# Patient Record
Sex: FEMALE | Race: WHITE | NOT HISPANIC OR LATINO
[De-identification: names, ages, dates, MRNs, and addresses within clinical notes are randomized per-mention and may not be internally consistent; named-entity substitution may affect disease eponyms.]

---

## 2017-03-27 ENCOUNTER — APPOINTMENT (OUTPATIENT)
Dept: RADIOLOGY | Facility: IMAGING CENTER | Age: 67
End: 2017-03-27

## 2017-05-22 ENCOUNTER — OUTPATIENT (OUTPATIENT)
Dept: OUTPATIENT SERVICES | Facility: HOSPITAL | Age: 67
LOS: 1 days | End: 2017-05-22
Payer: COMMERCIAL

## 2017-05-22 ENCOUNTER — APPOINTMENT (OUTPATIENT)
Dept: RADIOLOGY | Facility: IMAGING CENTER | Age: 67
End: 2017-05-22

## 2017-05-22 DIAGNOSIS — M85.80 OTHER SPECIFIED DISORDERS OF BONE DENSITY AND STRUCTURE, UNSPECIFIED SITE: ICD-10-CM

## 2017-05-22 PROCEDURE — 77080 DXA BONE DENSITY AXIAL: CPT

## 2017-09-19 ENCOUNTER — OUTPATIENT (OUTPATIENT)
Dept: OUTPATIENT SERVICES | Facility: HOSPITAL | Age: 67
LOS: 1 days | End: 2017-09-19
Payer: MEDICARE

## 2017-09-19 ENCOUNTER — APPOINTMENT (OUTPATIENT)
Dept: MAMMOGRAPHY | Facility: IMAGING CENTER | Age: 67
End: 2017-09-19
Payer: MEDICARE

## 2017-09-19 DIAGNOSIS — Z00.8 ENCOUNTER FOR OTHER GENERAL EXAMINATION: ICD-10-CM

## 2017-09-19 PROCEDURE — 77067 SCR MAMMO BI INCL CAD: CPT

## 2017-09-19 PROCEDURE — G0202: CPT | Mod: 26

## 2017-09-19 PROCEDURE — 77063 BREAST TOMOSYNTHESIS BI: CPT | Mod: 26

## 2017-09-19 PROCEDURE — 77063 BREAST TOMOSYNTHESIS BI: CPT

## 2018-09-20 ENCOUNTER — APPOINTMENT (OUTPATIENT)
Dept: MAMMOGRAPHY | Facility: IMAGING CENTER | Age: 68
End: 2018-09-20
Payer: MEDICARE

## 2018-09-20 ENCOUNTER — OUTPATIENT (OUTPATIENT)
Dept: OUTPATIENT SERVICES | Facility: HOSPITAL | Age: 68
LOS: 1 days | End: 2018-09-20
Payer: MEDICARE

## 2018-09-20 DIAGNOSIS — Z12.31 ENCOUNTER FOR SCREENING MAMMOGRAM FOR MALIGNANT NEOPLASM OF BREAST: ICD-10-CM

## 2018-09-20 DIAGNOSIS — R92.2 INCONCLUSIVE MAMMOGRAM: ICD-10-CM

## 2018-09-20 PROCEDURE — 77067 SCR MAMMO BI INCL CAD: CPT | Mod: 26

## 2018-09-20 PROCEDURE — 77063 BREAST TOMOSYNTHESIS BI: CPT

## 2018-09-20 PROCEDURE — 77063 BREAST TOMOSYNTHESIS BI: CPT | Mod: 26

## 2018-09-20 PROCEDURE — 77067 SCR MAMMO BI INCL CAD: CPT

## 2019-11-11 ENCOUNTER — OUTPATIENT (OUTPATIENT)
Dept: OUTPATIENT SERVICES | Facility: HOSPITAL | Age: 69
LOS: 1 days | End: 2019-11-11
Payer: MEDICARE

## 2019-11-11 ENCOUNTER — APPOINTMENT (OUTPATIENT)
Dept: MAMMOGRAPHY | Facility: IMAGING CENTER | Age: 69
End: 2019-11-11
Payer: MEDICARE

## 2019-11-11 DIAGNOSIS — Z12.31 ENCOUNTER FOR SCREENING MAMMOGRAM FOR MALIGNANT NEOPLASM OF BREAST: ICD-10-CM

## 2019-11-11 PROCEDURE — 77067 SCR MAMMO BI INCL CAD: CPT

## 2019-11-11 PROCEDURE — 77063 BREAST TOMOSYNTHESIS BI: CPT

## 2019-11-11 PROCEDURE — 77067 SCR MAMMO BI INCL CAD: CPT | Mod: 26

## 2019-11-11 PROCEDURE — 77063 BREAST TOMOSYNTHESIS BI: CPT | Mod: 26

## 2020-12-07 ENCOUNTER — OUTPATIENT (OUTPATIENT)
Dept: OUTPATIENT SERVICES | Facility: HOSPITAL | Age: 70
LOS: 1 days | End: 2020-12-07
Payer: MEDICARE

## 2020-12-07 ENCOUNTER — APPOINTMENT (OUTPATIENT)
Dept: RADIOLOGY | Facility: IMAGING CENTER | Age: 70
End: 2020-12-07
Payer: MEDICARE

## 2020-12-07 ENCOUNTER — APPOINTMENT (OUTPATIENT)
Dept: MAMMOGRAPHY | Facility: IMAGING CENTER | Age: 70
End: 2020-12-07
Payer: MEDICARE

## 2020-12-07 DIAGNOSIS — Z00.8 ENCOUNTER FOR OTHER GENERAL EXAMINATION: ICD-10-CM

## 2020-12-07 PROCEDURE — 77063 BREAST TOMOSYNTHESIS BI: CPT | Mod: 26

## 2020-12-07 PROCEDURE — 77080 DXA BONE DENSITY AXIAL: CPT | Mod: 26

## 2020-12-07 PROCEDURE — 77067 SCR MAMMO BI INCL CAD: CPT

## 2020-12-07 PROCEDURE — 77067 SCR MAMMO BI INCL CAD: CPT | Mod: 26

## 2020-12-07 PROCEDURE — 77063 BREAST TOMOSYNTHESIS BI: CPT

## 2020-12-07 PROCEDURE — 77080 DXA BONE DENSITY AXIAL: CPT

## 2021-12-09 ENCOUNTER — APPOINTMENT (OUTPATIENT)
Dept: MAMMOGRAPHY | Facility: IMAGING CENTER | Age: 71
End: 2021-12-09
Payer: MEDICARE

## 2021-12-09 ENCOUNTER — OUTPATIENT (OUTPATIENT)
Dept: OUTPATIENT SERVICES | Facility: HOSPITAL | Age: 71
LOS: 1 days | End: 2021-12-09
Payer: MEDICARE

## 2021-12-09 DIAGNOSIS — Z00.8 ENCOUNTER FOR OTHER GENERAL EXAMINATION: ICD-10-CM

## 2021-12-09 PROCEDURE — 77067 SCR MAMMO BI INCL CAD: CPT | Mod: 26

## 2021-12-09 PROCEDURE — 77067 SCR MAMMO BI INCL CAD: CPT

## 2021-12-09 PROCEDURE — 77063 BREAST TOMOSYNTHESIS BI: CPT | Mod: 26

## 2021-12-09 PROCEDURE — 77063 BREAST TOMOSYNTHESIS BI: CPT

## 2022-03-02 ENCOUNTER — INPATIENT (INPATIENT)
Facility: HOSPITAL | Age: 72
LOS: 7 days | Discharge: PSYCHIATRIC OR SUBSTANCE USE FACILITY | DRG: 917 | End: 2022-03-10
Attending: INTERNAL MEDICINE | Admitting: HOSPITALIST
Payer: MEDICARE

## 2022-03-02 VITALS
TEMPERATURE: 99 F | DIASTOLIC BLOOD PRESSURE: 73 MMHG | RESPIRATION RATE: 18 BRPM | HEART RATE: 78 BPM | SYSTOLIC BLOOD PRESSURE: 162 MMHG

## 2022-03-02 DIAGNOSIS — R41.82 ALTERED MENTAL STATUS, UNSPECIFIED: ICD-10-CM

## 2022-03-02 LAB
ALBUMIN SERPL ELPH-MCNC: 3.9 G/DL — SIGNIFICANT CHANGE UP (ref 3.3–5)
ALP SERPL-CCNC: 72 U/L — SIGNIFICANT CHANGE UP (ref 40–120)
ALT FLD-CCNC: 17 U/L — SIGNIFICANT CHANGE UP (ref 10–45)
AMPHET UR-MCNC: NEGATIVE — SIGNIFICANT CHANGE UP
ANION GAP SERPL CALC-SCNC: 17 MMOL/L — SIGNIFICANT CHANGE UP (ref 5–17)
APAP SERPL-MCNC: <15 UG/ML — SIGNIFICANT CHANGE UP (ref 10–30)
APPEARANCE UR: CLEAR — SIGNIFICANT CHANGE UP
APTT BLD: 25.8 SEC — LOW (ref 27.5–35.5)
AST SERPL-CCNC: 34 U/L — SIGNIFICANT CHANGE UP (ref 10–40)
BACTERIA # UR AUTO: NEGATIVE — SIGNIFICANT CHANGE UP
BARBITURATES UR SCN-MCNC: NEGATIVE — SIGNIFICANT CHANGE UP
BASOPHILS # BLD AUTO: 0.01 K/UL — SIGNIFICANT CHANGE UP (ref 0–0.2)
BASOPHILS NFR BLD AUTO: 0.2 % — SIGNIFICANT CHANGE UP (ref 0–2)
BENZODIAZ UR-MCNC: NEGATIVE — SIGNIFICANT CHANGE UP
BILIRUB SERPL-MCNC: 1.1 MG/DL — SIGNIFICANT CHANGE UP (ref 0.2–1.2)
BILIRUB UR-MCNC: ABNORMAL
BUN SERPL-MCNC: 20 MG/DL — SIGNIFICANT CHANGE UP (ref 7–23)
CALCIUM SERPL-MCNC: 9.4 MG/DL — SIGNIFICANT CHANGE UP (ref 8.4–10.5)
CHLORIDE SERPL-SCNC: 100 MMOL/L — SIGNIFICANT CHANGE UP (ref 96–108)
CK SERPL-CCNC: 334 U/L — HIGH (ref 25–170)
CO2 SERPL-SCNC: 17 MMOL/L — LOW (ref 22–31)
COCAINE METAB.OTHER UR-MCNC: NEGATIVE — SIGNIFICANT CHANGE UP
COLOR SPEC: YELLOW — SIGNIFICANT CHANGE UP
CREAT SERPL-MCNC: 0.84 MG/DL — SIGNIFICANT CHANGE UP (ref 0.5–1.3)
DIFF PNL FLD: NEGATIVE — SIGNIFICANT CHANGE UP
EGFR: 75 ML/MIN/1.73M2 — SIGNIFICANT CHANGE UP
EOSINOPHIL # BLD AUTO: 0.01 K/UL — SIGNIFICANT CHANGE UP (ref 0–0.5)
EOSINOPHIL NFR BLD AUTO: 0.2 % — SIGNIFICANT CHANGE UP (ref 0–6)
EPI CELLS # UR: 1 /HPF — SIGNIFICANT CHANGE UP
ETHANOL SERPL-MCNC: SIGNIFICANT CHANGE UP MG/DL (ref 0–10)
GLUCOSE SERPL-MCNC: 117 MG/DL — HIGH (ref 70–99)
GLUCOSE UR QL: NEGATIVE — SIGNIFICANT CHANGE UP
HCT VFR BLD CALC: 44.2 % — SIGNIFICANT CHANGE UP (ref 34.5–45)
HGB BLD-MCNC: 15.7 G/DL — HIGH (ref 11.5–15.5)
HYALINE CASTS # UR AUTO: 14 /LPF — HIGH (ref 0–2)
IMM GRANULOCYTES NFR BLD AUTO: 0.2 % — SIGNIFICANT CHANGE UP (ref 0–1.5)
INR BLD: 1.36 RATIO — HIGH (ref 0.88–1.16)
KETONES UR-MCNC: ABNORMAL
LEUKOCYTE ESTERASE UR-ACNC: NEGATIVE — SIGNIFICANT CHANGE UP
LYMPHOCYTES # BLD AUTO: 0.77 K/UL — LOW (ref 1–3.3)
LYMPHOCYTES # BLD AUTO: 15.9 % — SIGNIFICANT CHANGE UP (ref 13–44)
MCHC RBC-ENTMCNC: 29.2 PG — SIGNIFICANT CHANGE UP (ref 27–34)
MCHC RBC-ENTMCNC: 35.5 GM/DL — SIGNIFICANT CHANGE UP (ref 32–36)
MCV RBC AUTO: 82.2 FL — SIGNIFICANT CHANGE UP (ref 80–100)
METHADONE UR-MCNC: NEGATIVE — SIGNIFICANT CHANGE UP
MONOCYTES # BLD AUTO: 0.41 K/UL — SIGNIFICANT CHANGE UP (ref 0–0.9)
MONOCYTES NFR BLD AUTO: 8.5 % — SIGNIFICANT CHANGE UP (ref 2–14)
NEUTROPHILS # BLD AUTO: 3.64 K/UL — SIGNIFICANT CHANGE UP (ref 1.8–7.4)
NEUTROPHILS NFR BLD AUTO: 75 % — SIGNIFICANT CHANGE UP (ref 43–77)
NITRITE UR-MCNC: NEGATIVE — SIGNIFICANT CHANGE UP
NRBC # BLD: 0 /100 WBCS — SIGNIFICANT CHANGE UP (ref 0–0)
OPIATES UR-MCNC: NEGATIVE — SIGNIFICANT CHANGE UP
OXYCODONE UR-MCNC: NEGATIVE — SIGNIFICANT CHANGE UP
PCP SPEC-MCNC: SIGNIFICANT CHANGE UP
PCP UR-MCNC: POSITIVE
PH UR: 6.5 — SIGNIFICANT CHANGE UP (ref 5–8)
PLATELET # BLD AUTO: 270 K/UL — SIGNIFICANT CHANGE UP (ref 150–400)
POTASSIUM SERPL-MCNC: 3.6 MMOL/L — SIGNIFICANT CHANGE UP (ref 3.5–5.3)
POTASSIUM SERPL-SCNC: 3.6 MMOL/L — SIGNIFICANT CHANGE UP (ref 3.5–5.3)
PROT SERPL-MCNC: 6.4 G/DL — SIGNIFICANT CHANGE UP (ref 6–8.3)
PROT UR-MCNC: ABNORMAL
PROTHROM AB SERPL-ACNC: 15.7 SEC — HIGH (ref 10.5–13.4)
RBC # BLD: 5.38 M/UL — HIGH (ref 3.8–5.2)
RBC # FLD: 13.4 % — SIGNIFICANT CHANGE UP (ref 10.3–14.5)
RBC CASTS # UR COMP ASSIST: 4 /HPF — SIGNIFICANT CHANGE UP (ref 0–4)
SALICYLATES SERPL-MCNC: <2 MG/DL — LOW (ref 15–30)
SODIUM SERPL-SCNC: 134 MMOL/L — LOW (ref 135–145)
SP GR SPEC: 1.03 — HIGH (ref 1.01–1.02)
THC UR QL: NEGATIVE — SIGNIFICANT CHANGE UP
TROPONIN T, HIGH SENSITIVITY RESULT: 15 NG/L — SIGNIFICANT CHANGE UP (ref 0–51)
UROBILINOGEN FLD QL: ABNORMAL
WBC # BLD: 4.85 K/UL — SIGNIFICANT CHANGE UP (ref 3.8–10.5)
WBC # FLD AUTO: 4.85 K/UL — SIGNIFICANT CHANGE UP (ref 3.8–10.5)
WBC UR QL: 3 /HPF — SIGNIFICANT CHANGE UP (ref 0–5)

## 2022-03-02 PROCEDURE — 99285 EMERGENCY DEPT VISIT HI MDM: CPT

## 2022-03-02 PROCEDURE — 72170 X-RAY EXAM OF PELVIS: CPT | Mod: 26

## 2022-03-02 PROCEDURE — 72125 CT NECK SPINE W/O DYE: CPT | Mod: 26,MA

## 2022-03-02 PROCEDURE — 93010 ELECTROCARDIOGRAM REPORT: CPT

## 2022-03-02 PROCEDURE — 70498 CT ANGIOGRAPHY NECK: CPT | Mod: 26,MA

## 2022-03-02 PROCEDURE — 70496 CT ANGIOGRAPHY HEAD: CPT | Mod: 26,MA

## 2022-03-02 PROCEDURE — 71045 X-RAY EXAM CHEST 1 VIEW: CPT | Mod: 26

## 2022-03-02 PROCEDURE — 70450 CT HEAD/BRAIN W/O DYE: CPT | Mod: 26,59,MA

## 2022-03-02 PROCEDURE — 73030 X-RAY EXAM OF SHOULDER: CPT | Mod: 26,LT

## 2022-03-02 RX ORDER — SODIUM CHLORIDE 9 MG/ML
1000 INJECTION, SOLUTION INTRAVENOUS ONCE
Refills: 0 | Status: COMPLETED | OUTPATIENT
Start: 2022-03-02 | End: 2022-03-02

## 2022-03-02 RX ADMIN — SODIUM CHLORIDE 1000 MILLILITER(S): 9 INJECTION, SOLUTION INTRAVENOUS at 19:56

## 2022-03-02 NOTE — ED ADULT NURSE NOTE - NS ED NOTE ABUSE RESPONSE YN
Pt states he needed the Metrix lancet and not the test strips.  Please send the new rx for the lancets to [facility]/[place] in [place].  Message confirmed.      Sending to Dr. Dobbins's pool.       No

## 2022-03-02 NOTE — ED ADULT TRIAGE NOTE - CHIEF COMPLAINT QUOTE
fall, found down by landlord naked with multiple bruises of various stages of healing, psych hx with polypharmacy denies all complaints

## 2022-03-02 NOTE — ED PROVIDER NOTE - OBJECTIVE STATEMENT
72 y/o female with PMHx of psychiatric illness brought in by ambulance [facility] As per patients bother she recently had several changes made to psychiatric medications over past few weeks. Usually he talks to her everyday. However, he has been unable to get in touch with her since Saturday. Patient does remember what happened over the past few days. She states that she thinks she may have had multiple falls. Patient states that she takes seroquen and ativan daily. Denies taking more medication than she is prescribed. Denies taking any other medications daily. Patient is oriented to place and self only. Unable to obtain full ROS or history from patient. She denies any suicidal or homicidal ideations.

## 2022-03-02 NOTE — ED PROVIDER NOTE - NSICDXPASTMEDICALHX_GEN_ALL_CORE_FT
PAST MEDICAL HISTORY:  No pertinent past medical history PAST MEDICAL HISTORY:  Anxiety and depression

## 2022-03-02 NOTE — ED ADULT NURSE NOTE - OBJECTIVE STATEMENT
70 y/o female with PMH psych illness, BIBEMS presenting to ED for fall. Per EMS, pt was found at the bottom of the steps by landlord, naked, with multiple bruises in multiple stages of healing throughout body.   As per patients bother she recently had several changes made to psychiatric medications over past few weeks. Usually he talks to her everyday. However, he has been unable to get in touch with her since Saturday. Patient does remember what happened over the past few days. She states that she thinks she may have had multiple falls. Patient states that she takes seroquen and ativan daily. Denies taking more medication than she is prescribed. Denies taking any other medications daily. Patient is alert, oriented to place and self only, no gross deformities or wounds noted, pt noted to have multiple bruises throughout body in multiple stages of healing. Unable to obtain full ROS or history from patient. She denies any suicidal or homicidal ideations.

## 2022-03-02 NOTE — ED ADULT NURSE REASSESSMENT NOTE - NS ED NURSE REASSESS COMMENT FT1
Family at bedside reports to MD & RN that they feel pt is unsafe to go home where pt lives alone at this time d/t multiple falls and history of depression.

## 2022-03-02 NOTE — ED PROVIDER NOTE - PHYSICAL EXAMINATION
CONSTITUTIONAL: Patient is awake, alert and oriented to self and place, patient appears confused, unable to recall events; covered in multiple bruises   HEAD: NCAT  EYES: PERRL bilaterally, EOMI,   NECK: Supple,   LUNGS: CTA B/L, no wheezes, rhonci or rales  HEART: RRR.+S1S2 no murmurs,   ABDOMEN: Soft, non-tender to palpation throughout all four quadrants,   MSK: No edema or calf tenderness b/l, FROM upper and lower ext b/l, (+) ecchymosis/abrasion to left shoulder, b/l wrists and elbows and right hip,   SKIN: multiple skin abrasions appear old;   NEURO: No focal deficits, unable to fully access;

## 2022-03-02 NOTE — ED PROVIDER NOTE - ATTENDING CONTRIBUTION TO CARE
Attending Statement (RIGO Solano MD):    HPI: 72y/o F with h/o depression, presenting after found by landlord on ground, partially unclothed; per collateral information from family, they have been unable to get in touch with her for several days (last known normal - per phoen conversation 3-4 days ago). Patient states she has been falling a lot, unsure why, states recently started on new psych medications (seroquel and ativan) but is otherwise unable to give more details of hpi.    Review of Systems:  -limited see hpi    PSH/PMH as noted above    On Physical Exam:  General: awake/alert, but appears to at times not understand questions or have difficulty answering questions  HEENT: PERRL, MMM  Neck: no neck tenderness, no nuchal rigidity  Cardiac: normal s1, s2; RRR; no MGR  Lungs: CTABL  Abdomen: soft nontender/nondistended  : no bladder tenderness or distension  Skin: intact, no rash  Extremities: no peripheral edema, no gross deformities  Neuro: no gross neurologic deficits; CN II-XII grossly intact; no rigidity or clonus, no tremors; 5/5 str grossly in all extremities, no reported regions of sensation loss in extremities; normal coordination with upper and lower extremities  Psych: depressed affect; denies SI/HI, denies AH/VH; does not appear to be responding to internal stimuli.      MDM: unwitnessed falls; AMS/confusion; not appearing grossly encephalopathic, not meningitic; possible metabolic derangements, unlikely infectious given afebrile; possible medication induced; per family collateral lives alone - at present is not a safe discharge; will proceed with further work-up in ED and then admission for further evaluation/management.

## 2022-03-02 NOTE — ED ADULT NURSE REASSESSMENT NOTE - NS ED NURSE REASSESS COMMENT FT1
Pt sleeping. Vitals taken, see flow sheets. Reoriented and educated on the call bell and call bell within reach.

## 2022-03-02 NOTE — ED ADULT NURSE NOTE - NSIMPLEMENTINTERV_GEN_ALL_ED
Implemented All Fall Risk Interventions:  [facility] to call system. Call bell, personal items and telephone within reach. Instruct patient to call for assistance. Room bathroom lighting operational. Non-slip footwear when patient is off stretcher. Physically safe environment: no spills, clutter or unnecessary equipment. Stretcher in lowest position, wheels locked, appropriate side rails in place. Provide visual cue, wrist band, yellow gown, etc. Monitor gait and stability. Monitor for mental status changes and reorient to person, place, and time. Review medications for side effects contributing to fall risk. Reinforce activity limits and safety measures with patient and family.

## 2022-03-03 DIAGNOSIS — F32.2 MAJOR DEPRESSIVE DISORDER, SINGLE EPISODE, SEVERE WITHOUT PSYCHOTIC FEATURES: ICD-10-CM

## 2022-03-03 DIAGNOSIS — E86.0 DEHYDRATION: ICD-10-CM

## 2022-03-03 DIAGNOSIS — G92.8 OTHER TOXIC ENCEPHALOPATHY: ICD-10-CM

## 2022-03-03 DIAGNOSIS — Z29.9 ENCOUNTER FOR PROPHYLACTIC MEASURES, UNSPECIFIED: ICD-10-CM

## 2022-03-03 DIAGNOSIS — F41.9 ANXIETY DISORDER, UNSPECIFIED: ICD-10-CM

## 2022-03-03 LAB
ANION GAP SERPL CALC-SCNC: 14 MMOL/L — SIGNIFICANT CHANGE UP (ref 5–17)
APPEARANCE UR: CLEAR — SIGNIFICANT CHANGE UP
BACTERIA # UR AUTO: NEGATIVE — SIGNIFICANT CHANGE UP
BILIRUB UR-MCNC: ABNORMAL
BUN SERPL-MCNC: 17 MG/DL — SIGNIFICANT CHANGE UP (ref 7–23)
CALCIUM SERPL-MCNC: 9.2 MG/DL — SIGNIFICANT CHANGE UP (ref 8.4–10.5)
CHLORIDE SERPL-SCNC: 102 MMOL/L — SIGNIFICANT CHANGE UP (ref 96–108)
CO2 SERPL-SCNC: 19 MMOL/L — LOW (ref 22–31)
COLOR SPEC: YELLOW — SIGNIFICANT CHANGE UP
CREAT SERPL-MCNC: 0.56 MG/DL — SIGNIFICANT CHANGE UP (ref 0.5–1.3)
DIFF PNL FLD: NEGATIVE — SIGNIFICANT CHANGE UP
EGFR: 99 ML/MIN/1.73M2 — SIGNIFICANT CHANGE UP
GLUCOSE SERPL-MCNC: 102 MG/DL — HIGH (ref 70–99)
GLUCOSE UR QL: NEGATIVE — SIGNIFICANT CHANGE UP
HCT VFR BLD CALC: 38.5 % — SIGNIFICANT CHANGE UP (ref 34.5–45)
HGB BLD-MCNC: 13.6 G/DL — SIGNIFICANT CHANGE UP (ref 11.5–15.5)
KETONES UR-MCNC: ABNORMAL
LEUKOCYTE ESTERASE UR-ACNC: NEGATIVE — SIGNIFICANT CHANGE UP
MCHC RBC-ENTMCNC: 29.2 PG — SIGNIFICANT CHANGE UP (ref 27–34)
MCHC RBC-ENTMCNC: 35.3 GM/DL — SIGNIFICANT CHANGE UP (ref 32–36)
MCV RBC AUTO: 82.8 FL — SIGNIFICANT CHANGE UP (ref 80–100)
NITRITE UR-MCNC: NEGATIVE — SIGNIFICANT CHANGE UP
NRBC # BLD: 0 /100 WBCS — SIGNIFICANT CHANGE UP (ref 0–0)
PH UR: 6.5 — SIGNIFICANT CHANGE UP (ref 5–8)
PLATELET # BLD AUTO: 240 K/UL — SIGNIFICANT CHANGE UP (ref 150–400)
POTASSIUM SERPL-MCNC: 3.6 MMOL/L — SIGNIFICANT CHANGE UP (ref 3.5–5.3)
POTASSIUM SERPL-SCNC: 3.6 MMOL/L — SIGNIFICANT CHANGE UP (ref 3.5–5.3)
PROT UR-MCNC: ABNORMAL
RBC # BLD: 4.65 M/UL — SIGNIFICANT CHANGE UP (ref 3.8–5.2)
RBC # FLD: 13.9 % — SIGNIFICANT CHANGE UP (ref 10.3–14.5)
RBC CASTS # UR COMP ASSIST: 2 /HPF — SIGNIFICANT CHANGE UP (ref 0–4)
SARS-COV-2 RNA SPEC QL NAA+PROBE: SIGNIFICANT CHANGE UP
SODIUM SERPL-SCNC: 135 MMOL/L — SIGNIFICANT CHANGE UP (ref 135–145)
SP GR SPEC: 1.04 — HIGH (ref 1.01–1.02)
TSH SERPL-MCNC: 4.05 UIU/ML — SIGNIFICANT CHANGE UP (ref 0.27–4.2)
UROBILINOGEN FLD QL: ABNORMAL
VIT B12 SERPL-MCNC: 1773 PG/ML — HIGH (ref 232–1245)
WBC # BLD: 5.52 K/UL — SIGNIFICANT CHANGE UP (ref 3.8–10.5)
WBC # FLD AUTO: 5.52 K/UL — SIGNIFICANT CHANGE UP (ref 3.8–10.5)
WBC UR QL: 2 /HPF — SIGNIFICANT CHANGE UP (ref 0–5)

## 2022-03-03 PROCEDURE — 99223 1ST HOSP IP/OBS HIGH 75: CPT

## 2022-03-03 PROCEDURE — 93010 ELECTROCARDIOGRAM REPORT: CPT

## 2022-03-03 RX ORDER — SODIUM CHLORIDE 9 MG/ML
1000 INJECTION INTRAMUSCULAR; INTRAVENOUS; SUBCUTANEOUS ONCE
Refills: 0 | Status: COMPLETED | OUTPATIENT
Start: 2022-03-03 | End: 2022-03-03

## 2022-03-03 RX ORDER — SODIUM CHLORIDE 9 MG/ML
1000 INJECTION, SOLUTION INTRAVENOUS
Refills: 0 | Status: DISCONTINUED | OUTPATIENT
Start: 2022-03-03 | End: 2022-03-04

## 2022-03-03 RX ORDER — LAMOTRIGINE 25 MG/1
125 TABLET, ORALLY DISINTEGRATING ORAL DAILY
Refills: 0 | Status: DISCONTINUED | OUTPATIENT
Start: 2022-03-03 | End: 2022-03-10

## 2022-03-03 RX ORDER — LAMOTRIGINE 25 MG/1
150 TABLET, ORALLY DISINTEGRATING ORAL AT BEDTIME
Refills: 0 | Status: DISCONTINUED | OUTPATIENT
Start: 2022-03-03 | End: 2022-03-10

## 2022-03-03 RX ORDER — HEPARIN SODIUM 5000 [USP'U]/ML
5000 INJECTION INTRAVENOUS; SUBCUTANEOUS EVERY 8 HOURS
Refills: 0 | Status: DISCONTINUED | OUTPATIENT
Start: 2022-03-03 | End: 2022-03-07

## 2022-03-03 RX ADMIN — HEPARIN SODIUM 5000 UNIT(S): 5000 INJECTION INTRAVENOUS; SUBCUTANEOUS at 21:53

## 2022-03-03 RX ADMIN — SODIUM CHLORIDE 1000 MILLILITER(S): 9 INJECTION INTRAMUSCULAR; INTRAVENOUS; SUBCUTANEOUS at 11:40

## 2022-03-03 RX ADMIN — HEPARIN SODIUM 5000 UNIT(S): 5000 INJECTION INTRAVENOUS; SUBCUTANEOUS at 13:49

## 2022-03-03 RX ADMIN — LAMOTRIGINE 150 MILLIGRAM(S): 25 TABLET, ORALLY DISINTEGRATING ORAL at 21:52

## 2022-03-03 RX ADMIN — SODIUM CHLORIDE 75 MILLILITER(S): 9 INJECTION, SOLUTION INTRAVENOUS at 21:52

## 2022-03-03 RX ADMIN — SODIUM CHLORIDE 75 MILLILITER(S): 9 INJECTION, SOLUTION INTRAVENOUS at 11:40

## 2022-03-03 NOTE — H&P ADULT - PROBLEM SELECTOR PLAN 2
- s/p 1L bolus of crystalloids in the ED  - start LR @ 75 cc/hr, until patient's mental status improves and is able to maintain adequate oral intake on her own

## 2022-03-03 NOTE — BH CONSULTATION LIAISON ASSESSMENT NOTE - RISK ASSESSMENT
Risk factors: presents with SA via OD, still with SI, h/o prior SA, h/o psych admissions    Protective factors: no current HIIP, no active substance abuse, domiciled, social supports, engaged in treatment, compliant with treatment    Overall, pt is at acutely elevated risk of harm and requires psychiatric admission for safety and stabilization.

## 2022-03-03 NOTE — BH CONSULTATION LIAISON ASSESSMENT NOTE - NSBHCONSULTRECOMMENDOTHER_PSY_A_CORE FT
Restart Lamictal 125mg PO daily and 150mg PO qHS    Hold Ativan 0.5mg PO TID and Seroquel 200mg PO qhS for now - monitor for benzo withdrawal and tx accordingly    Check B12    2PC to inpt psych when medically cleared

## 2022-03-03 NOTE — H&P ADULT - ASSESSMENT
70 y/o female with PMHx of depression and anxiety was brought in by ambulance for being found on ground by landlord. She was found to be positive for PCP.

## 2022-03-03 NOTE — H&P ADULT - NSHPADDITIONALINFOADULT_GEN_ALL_CORE
Med rec is incomplete as patient was lethargic. Please confirm home medications with her pharmacy in the morning.

## 2022-03-03 NOTE — H&P ADULT - HISTORY OF PRESENT ILLNESS
70 y/o female with PMHx of psychiatric illness brought in by ambulance for being found on ground by landlord.      Vitals: afebrile, HR 87, /70, SpO2 96% on room air.  Labs: CBC unremarkable. CMP shows metabolic acidosis. . TSH wnl. U/A shows concentrated urine with ketones. No UTI. U tox shows positive PCP.  X ray shoulder- no fracture or dislocation.  CXR- clear lungs  X ray pelvis: no fractures  CT brain: no ICH  CT c-spine: no fracture. Multilevel spondylosis.    ED intervention: LR 1L bolus.  CTA brain and neck: no flow limiting stenosis or occlusion.   72 y/o female with PMHx of depression and anxiety was brought in by ambulance for being found on ground by landlord. Pt states she remembers feeling dizzy and falling down a set of stairs. She does not recall being found by her landlord. She also states her psych meds were recently being adjusted. Her Lexapro had been discontinued, then re-instated about 3-4 weeks ago. Since then she reported feeling more dizzy than usual. She also takes Seroquel and ativan. She endorsed depressed mood but denies any suicidal ideation or hallucination. Further history was limited as patient was lethargic.    Vitals: afebrile, HR 87, /70, SpO2 96% on room air.  Labs: CBC unremarkable. CMP shows metabolic acidosis. . TSH wnl. U/A shows concentrated urine with ketones. No UTI. U tox shows positive PCP.  X ray shoulder- no fracture or dislocation.  CXR- clear lungs  X ray pelvis: no fractures  CT brain: no ICH  CT c-spine: no fracture. Multilevel spondylosis.    ED intervention: LR 1L bolus.  CTA brain and neck: no flow limiting stenosis or occlusion.

## 2022-03-03 NOTE — BH CONSULTATION LIAISON ASSESSMENT NOTE - NSCOMMENTSUICRISKFACT_PSY_ALL_CORE
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 Feels very isolated and lonely.

## 2022-03-03 NOTE — PROGRESS NOTE ADULT - ASSESSMENT
72 y/o female     with PMHx of depression and anxiety  pt admits  to prior  suicidal attempt, trs  ago/ states  she is divorved  and celis s no children           was brought in by ambulance for being found on ground by landlord.   ct  head,  normal  She was found to be positive for PCP.   U tox positive for PCP/  pt  denies  using any  drugs    h/o depression, anxiety   pt   is  alert,  able  to answer  all questions   psych eval,  told  pt needs   pysch  admit  mild  hyponatremia,  on iv fluids   abnormal  ekg.  eith st  depressions/ card eval/  echo   on dvt ppx        72 y/o female     with PMHx of depression and anxiety  pt admits  to prior  suicidal attempt, trs  ago/   states  she is divorved  and celis s no children           was brought in by ambulance for being found on ground by landlord.   ct  head,  normal  She was found to be positive for PCP.  admits to taking  about 90 pills of ativan   U tox positive for PCP/  pt  denies  using any  drugs    h/o depression, anxiety   pt   is  alert,  able  to answer  all questions   psych eval,  told  pt needs   pysch  admit  mild  hyponatremia,  on iv fluids   abnormal  ekg.  eith st  depressions/ card eval/  echo   on dvt ppx

## 2022-03-03 NOTE — BH CONSULTATION LIAISON ASSESSMENT NOTE - CURRENT MEDICATION
MEDICATIONS  (STANDING):  heparin   Injectable 5000 Unit(s) SubCutaneous every 8 hours  lactated ringers. 1000 milliLiter(s) (75 mL/Hr) IV Continuous <Continuous>  sodium chloride 0.9% Bolus 1000 milliLiter(s) IV Bolus once    MEDICATIONS  (PRN):

## 2022-03-03 NOTE — H&P ADULT - PROBLEM SELECTOR PLAN 4
- DVT ppx: heparin subq  - GI ppx: none  - Diet: NPO, pending speech pathology evaluation  - Fluids: LR @ 75 cc/hr

## 2022-03-03 NOTE — H&P ADULT - NSHPLABSRESULTS_GEN_ALL_CORE
LABS:                         15.7   4.85  )-----------( 270      ( 02 Mar 2022 19:42 )             44.2     03-02    134<L>  |  100  |  20  ----------------------------<  117<H>  3.6   |  17<L>  |  0.84    Ca    9.4      02 Mar 2022 19:42    TPro  6.4  /  Alb  3.9  /  TBili  1.1  /  DBili  x   /  AST  34  /  ALT  17  /  AlkPhos  72  03-02    PT/INR - ( 02 Mar 2022 19:42 )   PT: 15.7 sec;   INR: 1.36 ratio         PTT - ( 02 Mar 2022 19:42 )  PTT:25.8 sec  Urinalysis Basic - ( 02 Mar 2022 22:02 )    Color: Yellow / Appearance: Clear / S.032 / pH: x  Gluc: x / Ketone: Large  / Bili: Small / Urobili: 2 mg/dL   Blood: x / Protein: 30 mg/dL / Nitrite: Negative   Leuk Esterase: Negative / RBC: 4 /hpf / WBC 3 /HPF   Sq Epi: x / Non Sq Epi: 1 /hpf / Bacteria: Negative      CARDIAC MARKERS ( 02 Mar 2022 19:42 )  x     / x     / 334 U/L / x     / x              Records reviewed from prior hospitalization.  Labs reviewed remarkable for - CBC unremarkable. CMP shows metabolic acidosis. . TSH wnl. U/A shows concentrated urine with ketones. No UTI. U tox shows positive PCP.  CXR personally reviewed - clear lungs

## 2022-03-03 NOTE — H&P ADULT - PROBLEM SELECTOR PLAN 3
- hold home lexapro, seroquel, and ativan for now  - istop reviewed  - please obtain psych consult in the morning

## 2022-03-03 NOTE — BH CONSULTATION LIAISON ASSESSMENT NOTE - SUMMARY
71F , noncaregiver, retired , domiciled alone, with PPHx bipolar d/o, 2 psych admissions (1987, 1992), remote h/o SA via OD, no h/o substance abuse, with no significant PMH, a/w AMS, later disclosed to staff she had overdosed on 90 tabs Ativan as a suicide attempt, psychiatry consulted for suicidality.  Pt AOx4 on interview, reports several months of worsening depression culminating in SA via OD, left her journal and signed checks as part of her preprations.  States she took 90 tablets of ativan 0.5 mg, 2 tabs Seroquel 200mg and 2 tabs Lamictal 100mg.  Feels upset the SA was not successful, also endorsing persecutory delusions that the devil is after her, if bad things happen it is because of the devil.  Denies HI or substance abuse.

## 2022-03-03 NOTE — BH CONSULTATION LIAISON ASSESSMENT NOTE - HPI (INCLUDE ILLNESS QUALITY, SEVERITY, DURATION, TIMING, CONTEXT, MODIFYING FACTORS, ASSOCIATED SIGNS AND SYMPTOMS)
71F , noncaregiver, retired , domiciled alone, with PPHx bipolar d/o, 2 psych admissions (1987, 1992), remote h/o SA via OD, no h/o substance abuse, with no significant PMH, a/w AMS, later disclosed to staff she had overdosed on 90 tabs Ativan as a suicide attempt, psychiatry consulted for suicidality.     On initial interview patient is AOx4, calm and cooperative, lying in bed. States that she has been feeling very depressed since her psychiatrist of 35 years retired and her close friend moved to [place] around the same time. Reports she made the plan to suicide via OD by taking 90 tablets of ativan 0.5 mg, 2 tabs Seroquel 200mg and 2 tabs Lamictal 100mg.  With regard to preparations, patient states she left behind her journal which described her feelings of depression and blank checks with her signature before taking the pills. She reports that the attempt was both planned and impulsive; states on the day of, she felt as if "she can't do this anymore." States she is upset that it didn't work.  Endorses persecutory delusions that the "devil is trying to get me." Explains that she feels like shes being punished by the devil. Denies alcohol, tobacco, or drug use. Denies AVH, HI, or current manic sx.     Collateral obtained with permission from brother (Sonny): [facility] patient has a history of depression and anxiety for over 40 years now, which started after a bad divorce. Explains that patient has ongoing specific anxieties (ie: driving, elevators) that she takes Ativan for. Patient recently switched psychiatrists to Dr. Car and medications were changed.  Reports pt has a history of 2 prior psych admissions (1st 40 years ago from cutting and 2nd was 20 years ago from overdose).     Spoke with OP psychiatrist Dr. Car who last saw pt on 2/28; states pt was c/o depression without psychosis or SI at that time, and Lamictal was increased.  Pt had recently self-initiated Lexapro for about 13 days from old supply, which he recommended she discontinue as it had not helped her previously.  Pt was continued on Ativan and Seroquel which she has been on for a long time.  [facility] pt was transferred to his care from the prior psychiatrist who retired in November.

## 2022-03-03 NOTE — BH CONSULTATION LIAISON ASSESSMENT NOTE - NSBHCHARTREVIEWLAB_PSY_A_CORE FT
15.7   4.85  )-----------( 270      ( 02 Mar 2022 19:42 )             44.2     03-02    134<L>  |  100  |  20  ----------------------------<  117<H>  3.6   |  17<L>  |  0.84    Ca    9.4      02 Mar 2022 19:42    TPro  6.4  /  Alb  3.9  /  TBili  1.1  /  DBili  x   /  AST  34  /  ALT  17  /  AlkPhos  72  03-02    Urinalysis Basic - ( 02 Mar 2022 22:02 )    Color: Yellow / Appearance: Clear / S.032 / pH: x  Gluc: x / Ketone: Large  / Bili: Small / Urobili: 2 mg/dL   Blood: x / Protein: 30 mg/dL / Nitrite: Negative   Leuk Esterase: Negative / RBC: 4 /hpf / WBC 3 /HPF   Sq Epi: x / Non Sq Epi: 1 /hpf / Bacteria: Negative                           13.6   5.52  )-----------( 240      ( 03 Mar 2022 11:23 )             38.5     03-03    135  |  102  |  17  ----------------------------<  102<H>  3.6   |  19<L>  |  0.56    Ca    9.2      03 Mar 2022 11:23    TPro  6.4  /  Alb  3.9  /  TBili  1.1  /  DBili  x   /  AST  34  /  ALT  17  /  AlkPhos  72  03-02    Urinalysis Basic - ( 02 Mar 2022 22:02 )    Color: Yellow / Appearance: Clear / S.032 / pH: x  Gluc: x / Ketone: Large  / Bili: Small / Urobili: 2 mg/dL   Blood: x / Protein: 30 mg/dL / Nitrite: Negative   Leuk Esterase: Negative / RBC: 4 /hpf / WBC 3 /HPF   Sq Epi: x / Non Sq Epi: 1 /hpf / Bacteria: Negative

## 2022-03-03 NOTE — BH CONSULTATION LIAISON ASSESSMENT NOTE - DESCRIPTION
Patient is  with no children, living alone. Retried . Now volunteering at food pantry.  Denies alcohol, tobacco or drug use.

## 2022-03-03 NOTE — BH CONSULTATION LIAISON ASSESSMENT NOTE - WAS THIS WITHIN THE PAST 3 MONTHS?
Shaina Nadia stopped by the office and dropped off a paper stating he is holding aspirin because of nose bleeds  Can he hold Xarelto 2-3 days? ?? Yes

## 2022-03-03 NOTE — H&P ADULT - PROBLEM SELECTOR PLAN 1
- U tox positive for PCP  - clarify with the patient her drug history once more alert  - mental status improving since ED visit  - NPO pending speech pathology evaluation  - please obtain psych consult in the morning - U tox positive for PCP  - TSH wnl, no evidence of infection to explain encephalopathy  - clarify with the patient her drug history once she is more alert  - mental status improving since ED visit  - NPO pending speech pathology evaluation  - please obtain psych consult in the morning

## 2022-03-03 NOTE — H&P ADULT - NSHPPHYSICALEXAM_GEN_ALL_CORE
Vital Signs Last 24 Hrs  T(C): 36.8 (03 Mar 2022 04:13), Max: 37.6 (02 Mar 2022 19:32)  T(F): 98.3 (03 Mar 2022 04:13), Max: 99.7 (02 Mar 2022 19:32)  HR: 75 (03 Mar 2022 04:13) (75 - 107)  BP: 100/71 (03 Mar 2022 04:13) (100/71 - 162/73)  BP(mean): 94 (02 Mar 2022 23:22) (85 - 97)  RR: 18 (03 Mar 2022 04:13) (18 - 20)  SpO2: 98% (03 Mar 2022 04:13) (95% - 100%)

## 2022-03-03 NOTE — BH CONSULTATION LIAISON ASSESSMENT NOTE - NSBHCHARTREVIEWVS_PSY_A_CORE FT
Vital Signs Last 24 Hrs  T(C): 37.1 (03 Mar 2022 09:51), Max: 37.6 (02 Mar 2022 19:32)  T(F): 98.8 (03 Mar 2022 09:51), Max: 99.7 (02 Mar 2022 19:32)  HR: 92 (03 Mar 2022 09:51) (75 - 107)  BP: 97/57 (03 Mar 2022 09:51) (97/57 - 162/73)  BP(mean): 94 (02 Mar 2022 23:22) (85 - 97)  RR: 18 (03 Mar 2022 09:51) (18 - 20)  SpO2: 95% (03 Mar 2022 09:51) (95% - 100%)

## 2022-03-04 LAB
ANION GAP SERPL CALC-SCNC: 10 MMOL/L — SIGNIFICANT CHANGE UP (ref 5–17)
BUN SERPL-MCNC: 14 MG/DL — SIGNIFICANT CHANGE UP (ref 7–23)
CALCIUM SERPL-MCNC: 8.6 MG/DL — SIGNIFICANT CHANGE UP (ref 8.4–10.5)
CHLORIDE SERPL-SCNC: 106 MMOL/L — SIGNIFICANT CHANGE UP (ref 96–108)
CO2 SERPL-SCNC: 23 MMOL/L — SIGNIFICANT CHANGE UP (ref 22–31)
CREAT SERPL-MCNC: 0.53 MG/DL — SIGNIFICANT CHANGE UP (ref 0.5–1.3)
CULTURE RESULTS: NO GROWTH — SIGNIFICANT CHANGE UP
EGFR: 100 ML/MIN/1.73M2 — SIGNIFICANT CHANGE UP
FOLATE SERPL-MCNC: 7.5 NG/ML — SIGNIFICANT CHANGE UP
GLUCOSE SERPL-MCNC: 109 MG/DL — HIGH (ref 70–99)
HCT VFR BLD CALC: 33.4 % — LOW (ref 34.5–45)
HCV AB S/CO SERPL IA: 0.09 S/CO — SIGNIFICANT CHANGE UP (ref 0–0.99)
HCV AB SERPL-IMP: SIGNIFICANT CHANGE UP
HGB BLD-MCNC: 11.5 G/DL — SIGNIFICANT CHANGE UP (ref 11.5–15.5)
MCHC RBC-ENTMCNC: 28.8 PG — SIGNIFICANT CHANGE UP (ref 27–34)
MCHC RBC-ENTMCNC: 34.4 GM/DL — SIGNIFICANT CHANGE UP (ref 32–36)
MCV RBC AUTO: 83.7 FL — SIGNIFICANT CHANGE UP (ref 80–100)
NRBC # BLD: 0 /100 WBCS — SIGNIFICANT CHANGE UP (ref 0–0)
PLATELET # BLD AUTO: 217 K/UL — SIGNIFICANT CHANGE UP (ref 150–400)
POTASSIUM SERPL-MCNC: 3.1 MMOL/L — LOW (ref 3.5–5.3)
POTASSIUM SERPL-SCNC: 3.1 MMOL/L — LOW (ref 3.5–5.3)
RBC # BLD: 3.99 M/UL — SIGNIFICANT CHANGE UP (ref 3.8–5.2)
RBC # FLD: 13.8 % — SIGNIFICANT CHANGE UP (ref 10.3–14.5)
SODIUM SERPL-SCNC: 139 MMOL/L — SIGNIFICANT CHANGE UP (ref 135–145)
SPECIMEN SOURCE: SIGNIFICANT CHANGE UP
T PALLIDUM AB TITR SER: NEGATIVE — SIGNIFICANT CHANGE UP
WBC # BLD: 4.66 K/UL — SIGNIFICANT CHANGE UP (ref 3.8–10.5)
WBC # FLD AUTO: 4.66 K/UL — SIGNIFICANT CHANGE UP (ref 3.8–10.5)

## 2022-03-04 PROCEDURE — 93306 TTE W/DOPPLER COMPLETE: CPT | Mod: 26

## 2022-03-04 PROCEDURE — 99232 SBSQ HOSP IP/OBS MODERATE 35: CPT

## 2022-03-04 RX ORDER — QUETIAPINE FUMARATE 200 MG/1
200 TABLET, FILM COATED ORAL AT BEDTIME
Refills: 0 | Status: DISCONTINUED | OUTPATIENT
Start: 2022-03-04 | End: 2022-03-10

## 2022-03-04 RX ORDER — ASPIRIN/CALCIUM CARB/MAGNESIUM 324 MG
81 TABLET ORAL DAILY
Refills: 0 | Status: DISCONTINUED | OUTPATIENT
Start: 2022-03-04 | End: 2022-03-10

## 2022-03-04 RX ORDER — POTASSIUM CHLORIDE 20 MEQ
40 PACKET (EA) ORAL EVERY 4 HOURS
Refills: 0 | Status: COMPLETED | OUTPATIENT
Start: 2022-03-04 | End: 2022-03-04

## 2022-03-04 RX ADMIN — QUETIAPINE FUMARATE 200 MILLIGRAM(S): 200 TABLET, FILM COATED ORAL at 21:27

## 2022-03-04 RX ADMIN — LAMOTRIGINE 150 MILLIGRAM(S): 25 TABLET, ORALLY DISINTEGRATING ORAL at 21:22

## 2022-03-04 RX ADMIN — Medication 0.5 MILLIGRAM(S): at 21:22

## 2022-03-04 RX ADMIN — LAMOTRIGINE 125 MILLIGRAM(S): 25 TABLET, ORALLY DISINTEGRATING ORAL at 12:41

## 2022-03-04 RX ADMIN — HEPARIN SODIUM 5000 UNIT(S): 5000 INJECTION INTRAVENOUS; SUBCUTANEOUS at 15:07

## 2022-03-04 RX ADMIN — HEPARIN SODIUM 5000 UNIT(S): 5000 INJECTION INTRAVENOUS; SUBCUTANEOUS at 06:09

## 2022-03-04 RX ADMIN — Medication 40 MILLIEQUIVALENT(S): at 17:32

## 2022-03-04 RX ADMIN — HEPARIN SODIUM 5000 UNIT(S): 5000 INJECTION INTRAVENOUS; SUBCUTANEOUS at 21:22

## 2022-03-04 RX ADMIN — Medication 40 MILLIEQUIVALENT(S): at 12:43

## 2022-03-04 RX ADMIN — Medication 81 MILLIGRAM(S): at 12:40

## 2022-03-04 NOTE — PATIENT PROFILE ADULT - FALL HARM RISK - HARM RISK INTERVENTIONS

## 2022-03-04 NOTE — PROGRESS NOTE ADULT - ASSESSMENT
70 y/o female     with PMHx of depression and anxiety  pt admits  to prior  suicidal attempt, trs  ago/   states  she is divorved  and celis s no children           was brought in by ambulance for being found on ground by landlord.   ct  head,  normal  She was found to be positive for PCP.  admits to taking  about 90 pills of ativan   U tox positive for PCP/  pt  denies  using any  drugs    h/o depression, anxiety   pt   is  alert,  able  to answer  all questions   psych eval,  told  pt needs transfer  to   [facility]  facility/  form  filled/ S/W  awarew  mild  hyponatremia,, resolved   abnormal  ekg.  eith st  depressions/     card eval/  echo  still  pending   [facility] card  clearnce   on dvt ppx

## 2022-03-04 NOTE — BH CONSULTATION LIAISON PROGRESS NOTE - NSBHCONSULTRECOMMENDOTHER_PSY_A_CORE FT
c/w Lamictal 125mg PO daily and 150mg PO qHS    Restart Ativan 0.5mg PO TID     Restart Seroquel 200mg PO qhS for now     2PC to inpt psych when medically cleared

## 2022-03-04 NOTE — BH CONSULTATION LIAISON PROGRESS NOTE - NSBHASSESSMENTFT_PSY_ALL_CORE
bipolar depression severe with psychosis  71F , noncaregiver, retired , domiciled alone, with PPHx bipolar d/o, 2 psych admissions (1987, 1992), remote h/o SA via OD, no h/o substance abuse, with no significant PMH, a/w AMS, later disclosed to staff she had overdosed on 90 tabs Ativan as a suicide attempt, psychiatry consulted for suicidality.  Pt AOx4 on interview, reports several months of worsening depression culminating in SA via OD, left her journal and signed checks as part of her preprations.  States she took 90 tablets of ativan 0.5 mg, 2 tabs Seroquel 200mg and 2 tabs Lamictal 100mg.  Feels upset the SA was not successful, also endorsing persecutory delusions that the devil is after her, if bad things happen it is because of the devil.  Denies HI or substance abuse.

## 2022-03-04 NOTE — PATIENT PROFILE ADULT - FALL HARM RISK - FALLEN IN PAST
Accidental fall b/l LE/alert and oriented x 3/responds to verbal commands/sensation intact/cranial nerves intact/strength decreased

## 2022-03-05 LAB
A1C WITH ESTIMATED AVERAGE GLUCOSE RESULT: 4.8 % — SIGNIFICANT CHANGE UP (ref 4–5.6)
ANION GAP SERPL CALC-SCNC: 10 MMOL/L — SIGNIFICANT CHANGE UP (ref 5–17)
BUN SERPL-MCNC: 6 MG/DL — LOW (ref 7–23)
CALCIUM SERPL-MCNC: 8.9 MG/DL — SIGNIFICANT CHANGE UP (ref 8.4–10.5)
CHLORIDE SERPL-SCNC: 113 MMOL/L — HIGH (ref 96–108)
CHOLEST SERPL-MCNC: 148 MG/DL — SIGNIFICANT CHANGE UP
CO2 SERPL-SCNC: 22 MMOL/L — SIGNIFICANT CHANGE UP (ref 22–31)
CREAT SERPL-MCNC: 0.53 MG/DL — SIGNIFICANT CHANGE UP (ref 0.5–1.3)
EGFR: 100 ML/MIN/1.73M2 — SIGNIFICANT CHANGE UP
ESTIMATED AVERAGE GLUCOSE: 91 MG/DL — SIGNIFICANT CHANGE UP (ref 68–114)
GLUCOSE SERPL-MCNC: 102 MG/DL — HIGH (ref 70–99)
HCT VFR BLD CALC: 32.6 % — LOW (ref 34.5–45)
HDLC SERPL-MCNC: 45 MG/DL — LOW
HGB BLD-MCNC: 11.4 G/DL — LOW (ref 11.5–15.5)
LIPID PNL WITH DIRECT LDL SERPL: 83 MG/DL — SIGNIFICANT CHANGE UP
MCHC RBC-ENTMCNC: 29.8 PG — SIGNIFICANT CHANGE UP (ref 27–34)
MCHC RBC-ENTMCNC: 35 GM/DL — SIGNIFICANT CHANGE UP (ref 32–36)
MCV RBC AUTO: 85.1 FL — SIGNIFICANT CHANGE UP (ref 80–100)
NON HDL CHOLESTEROL: 103 MG/DL — SIGNIFICANT CHANGE UP
NRBC # BLD: 0 /100 WBCS — SIGNIFICANT CHANGE UP (ref 0–0)
PLATELET # BLD AUTO: 221 K/UL — SIGNIFICANT CHANGE UP (ref 150–400)
POTASSIUM SERPL-MCNC: 3.9 MMOL/L — SIGNIFICANT CHANGE UP (ref 3.5–5.3)
POTASSIUM SERPL-SCNC: 3.9 MMOL/L — SIGNIFICANT CHANGE UP (ref 3.5–5.3)
RBC # BLD: 3.83 M/UL — SIGNIFICANT CHANGE UP (ref 3.8–5.2)
RBC # FLD: 14 % — SIGNIFICANT CHANGE UP (ref 10.3–14.5)
SODIUM SERPL-SCNC: 145 MMOL/L — SIGNIFICANT CHANGE UP (ref 135–145)
TRIGL SERPL-MCNC: 98 MG/DL — SIGNIFICANT CHANGE UP
TSH SERPL-MCNC: 7 UIU/ML — HIGH (ref 0.27–4.2)
WBC # BLD: 3.49 K/UL — LOW (ref 3.8–10.5)
WBC # FLD AUTO: 3.49 K/UL — LOW (ref 3.8–10.5)

## 2022-03-05 RX ORDER — SODIUM CHLORIDE 9 MG/ML
500 INJECTION INTRAMUSCULAR; INTRAVENOUS; SUBCUTANEOUS
Refills: 0 | Status: DISCONTINUED | OUTPATIENT
Start: 2022-03-05 | End: 2022-03-06

## 2022-03-05 RX ADMIN — LAMOTRIGINE 150 MILLIGRAM(S): 25 TABLET, ORALLY DISINTEGRATING ORAL at 21:10

## 2022-03-05 RX ADMIN — QUETIAPINE FUMARATE 200 MILLIGRAM(S): 200 TABLET, FILM COATED ORAL at 21:09

## 2022-03-05 RX ADMIN — HEPARIN SODIUM 5000 UNIT(S): 5000 INJECTION INTRAVENOUS; SUBCUTANEOUS at 13:16

## 2022-03-05 RX ADMIN — Medication 0.5 MILLIGRAM(S): at 21:13

## 2022-03-05 RX ADMIN — LAMOTRIGINE 125 MILLIGRAM(S): 25 TABLET, ORALLY DISINTEGRATING ORAL at 13:16

## 2022-03-05 RX ADMIN — Medication 81 MILLIGRAM(S): at 13:16

## 2022-03-05 RX ADMIN — HEPARIN SODIUM 5000 UNIT(S): 5000 INJECTION INTRAVENOUS; SUBCUTANEOUS at 05:28

## 2022-03-05 RX ADMIN — Medication 0.5 MILLIGRAM(S): at 14:24

## 2022-03-05 RX ADMIN — SODIUM CHLORIDE 50 MILLILITER(S): 9 INJECTION INTRAMUSCULAR; INTRAVENOUS; SUBCUTANEOUS at 09:05

## 2022-03-05 RX ADMIN — HEPARIN SODIUM 5000 UNIT(S): 5000 INJECTION INTRAVENOUS; SUBCUTANEOUS at 21:09

## 2022-03-05 RX ADMIN — Medication 0.5 MILLIGRAM(S): at 05:28

## 2022-03-05 NOTE — PROGRESS NOTE ADULT - ASSESSMENT
70 y/o female     with PMHx of depression and anxiety  pt admits  to prior  suicidal attempt, trs  ago/   states  she is divorved  and celis s no children           was brought in by ambulance for being found on ground by landlord.   ct  head,  normal  She was found to be positive for PCP.  admits to taking  about 90 pills of ativan   U tox positive for PCP/  pt  denies  using any  drugs    h/o depression, anxiety   pt   is  alert,  able  to answer  all questions   psych eval,  told  pt needs transfer  to   pysch  facility/  form  filled/ S/W  awarew  mild  hyponatremia,, resolved   abnormal  ekg.  st  depressions/ echo, normal  ef   per  card, need s ischemic  w/p/  stres  test   awiat card  clearance   prior to  transfer to psych facility   on dvt ppx

## 2022-03-06 PROCEDURE — 99231 SBSQ HOSP IP/OBS SF/LOW 25: CPT

## 2022-03-06 RX ADMIN — Medication 81 MILLIGRAM(S): at 12:35

## 2022-03-06 RX ADMIN — HEPARIN SODIUM 5000 UNIT(S): 5000 INJECTION INTRAVENOUS; SUBCUTANEOUS at 14:14

## 2022-03-06 RX ADMIN — HEPARIN SODIUM 5000 UNIT(S): 5000 INJECTION INTRAVENOUS; SUBCUTANEOUS at 21:35

## 2022-03-06 RX ADMIN — QUETIAPINE FUMARATE 200 MILLIGRAM(S): 200 TABLET, FILM COATED ORAL at 21:34

## 2022-03-06 RX ADMIN — Medication 0.5 MILLIGRAM(S): at 14:14

## 2022-03-06 RX ADMIN — HEPARIN SODIUM 5000 UNIT(S): 5000 INJECTION INTRAVENOUS; SUBCUTANEOUS at 05:15

## 2022-03-06 RX ADMIN — LAMOTRIGINE 150 MILLIGRAM(S): 25 TABLET, ORALLY DISINTEGRATING ORAL at 21:34

## 2022-03-06 RX ADMIN — LAMOTRIGINE 125 MILLIGRAM(S): 25 TABLET, ORALLY DISINTEGRATING ORAL at 12:35

## 2022-03-06 RX ADMIN — Medication 0.5 MILLIGRAM(S): at 21:34

## 2022-03-06 RX ADMIN — Medication 0.5 MILLIGRAM(S): at 05:16

## 2022-03-06 NOTE — PROGRESS NOTE ADULT - ASSESSMENT
72 y/o female     with PMHx of depression and anxiety  pt admits  to prior  suicidal attempt, trs  ago/   states  she is divorved  and celis s no children           was brought in by ambulance for being found on ground by landlord.   ct  head,  normal  She was found to be positive for PCP.  admits to taking  about 90 pills of ativan   U tox positive for PCP/  pt  denies  using any  drugs    h/o depression, anxiety   pt   is  alert,  able  to answer  all questions   psych eval,  told  pt needs transfer  to   pysch  facility/  form  filled/ S/W  awarew  mild  hyponatremia,, resolved   abnormal  ekg.  st  depressions/ echo, normal  ef   per  card, need s ischemic  w/p/     awiat card  clearance   prior to  transfer to psych facility   on dvt ppx /  stress  test today

## 2022-03-06 NOTE — BH CONSULTATION LIAISON PROGRESS NOTE - CASE SUMMARY
71F , noncaregiver, retired , domiciled alone, with PPHx bipolar d/o, 2 psych admissions (1987, 1992), remote h/o SA via OD, no h/o substance abuse, with no significant PMH, a/w AMS, later disclosed to staff she had overdosed on 90 tabs Ativan as a suicide attempt, psychiatry consulted for suicidality.  Pt AOx4 on interview, reports several months of worsening depression culminating in SA via OD, left her journal and signed checks as part of her preprations.  States she took 90 tablets of ativan 0.5 mg, 2 tabs Seroquel 200mg and 2 tabs Lamictal 100mg.  pt reports feeling less depressed today, expressed remorse. no si/hi current. cont current meds, 2pc status, waiting for psych bed

## 2022-03-06 NOTE — BH CONSULTATION LIAISON PROGRESS NOTE - NSBHCONSULTRECOMMENDOTHER_PSY_A_CORE FT
c/w Lamictal 125mg PO daily and 150mg PO qHS    Continue Ativan 0.5mg PO TID     Continue Seroquel 200mg PO qhS for now     2PC to inpt psych when medically cleared

## 2022-03-07 LAB — SARS-COV-2 RNA SPEC QL NAA+PROBE: SIGNIFICANT CHANGE UP

## 2022-03-07 PROCEDURE — 99232 SBSQ HOSP IP/OBS MODERATE 35: CPT

## 2022-03-07 PROCEDURE — 93010 ELECTROCARDIOGRAM REPORT: CPT

## 2022-03-07 PROCEDURE — 75574 CT ANGIO HRT W/3D IMAGE: CPT | Mod: 26

## 2022-03-07 RX ORDER — SENNA PLUS 8.6 MG/1
2 TABLET ORAL AT BEDTIME
Refills: 0 | Status: COMPLETED | OUTPATIENT
Start: 2022-03-07 | End: 2022-03-08

## 2022-03-07 RX ORDER — LEVOTHYROXINE SODIUM 125 MCG
25 TABLET ORAL DAILY
Refills: 0 | Status: DISCONTINUED | OUTPATIENT
Start: 2022-03-07 | End: 2022-03-10

## 2022-03-07 RX ORDER — APIXABAN 2.5 MG/1
10 TABLET, FILM COATED ORAL
Refills: 0 | Status: DISCONTINUED | OUTPATIENT
Start: 2022-03-07 | End: 2022-03-09

## 2022-03-07 RX ADMIN — Medication 0.5 MILLIGRAM(S): at 13:53

## 2022-03-07 RX ADMIN — SENNA PLUS 2 TABLET(S): 8.6 TABLET ORAL at 21:06

## 2022-03-07 RX ADMIN — HEPARIN SODIUM 5000 UNIT(S): 5000 INJECTION INTRAVENOUS; SUBCUTANEOUS at 05:02

## 2022-03-07 RX ADMIN — Medication 0.5 MILLIGRAM(S): at 21:06

## 2022-03-07 RX ADMIN — LAMOTRIGINE 150 MILLIGRAM(S): 25 TABLET, ORALLY DISINTEGRATING ORAL at 21:06

## 2022-03-07 RX ADMIN — Medication 0.5 MILLIGRAM(S): at 05:02

## 2022-03-07 RX ADMIN — Medication 81 MILLIGRAM(S): at 12:37

## 2022-03-07 RX ADMIN — HEPARIN SODIUM 5000 UNIT(S): 5000 INJECTION INTRAVENOUS; SUBCUTANEOUS at 13:55

## 2022-03-07 RX ADMIN — LAMOTRIGINE 125 MILLIGRAM(S): 25 TABLET, ORALLY DISINTEGRATING ORAL at 12:37

## 2022-03-07 RX ADMIN — HEPARIN SODIUM 5000 UNIT(S): 5000 INJECTION INTRAVENOUS; SUBCUTANEOUS at 21:06

## 2022-03-07 RX ADMIN — QUETIAPINE FUMARATE 200 MILLIGRAM(S): 200 TABLET, FILM COATED ORAL at 21:06

## 2022-03-07 NOTE — PROGRESS NOTE ADULT - ASSESSMENT
72 y/o female     with PMHx of depression and anxiety  pt admits  to prior  suicidal attempt, trs  ago/   states  she is divorved  and celis s no children           was brought in by ambulance for being found on ground by landlord.   ct  head,  normal  She was found to be positive for PCP.  admits to taking  about 90 pills of ativan   U tox positive for PCP/  pt  denies  using any  drugs    h/o depression, anxiety   pt   is  alert,  able  to answer  all questions   psych eval,  told  pt needs transfer  to   pysch  facility/  form  filled/ S/W  awarew   abnormal  ekg.  st  depressions/ echo, normal  ef   per  card, need s ischemic  w/p/     awiat card  clearance   prior to  transfer to psych facility   on dvt ppx /  await  cardiac  CT  and  then card  clerance/  brother  up  dated

## 2022-03-07 NOTE — BH CONSULTATION LIAISON PROGRESS NOTE - NSBHCONSULTRECOMMENDOTHER_PSY_A_CORE FT
c/w Lamictal 125mg PO daily and 150mg PO qHS    Reduce Ativan to 0.25mg PO qAM, 0.5mg PO qafternoon, and 0.5mg PO qHS    C/w Seroquel 200mg PO qhS     2PC to inpt psych when medically cleared

## 2022-03-08 PROBLEM — F41.9 ANXIETY DISORDER, UNSPECIFIED: Chronic | Status: ACTIVE | Noted: 2022-03-02

## 2022-03-08 LAB
ANION GAP SERPL CALC-SCNC: 10 MMOL/L — SIGNIFICANT CHANGE UP (ref 5–17)
BUN SERPL-MCNC: 8 MG/DL — SIGNIFICANT CHANGE UP (ref 7–23)
CALCIUM SERPL-MCNC: 9.4 MG/DL — SIGNIFICANT CHANGE UP (ref 8.4–10.5)
CHLORIDE SERPL-SCNC: 104 MMOL/L — SIGNIFICANT CHANGE UP (ref 96–108)
CO2 SERPL-SCNC: 28 MMOL/L — SIGNIFICANT CHANGE UP (ref 22–31)
CREAT SERPL-MCNC: 0.64 MG/DL — SIGNIFICANT CHANGE UP (ref 0.5–1.3)
EGFR: 96 ML/MIN/1.73M2 — SIGNIFICANT CHANGE UP
GLUCOSE SERPL-MCNC: 116 MG/DL — HIGH (ref 70–99)
HCT VFR BLD CALC: 34.6 % — SIGNIFICANT CHANGE UP (ref 34.5–45)
HGB BLD-MCNC: 11.7 G/DL — SIGNIFICANT CHANGE UP (ref 11.5–15.5)
MCHC RBC-ENTMCNC: 29.1 PG — SIGNIFICANT CHANGE UP (ref 27–34)
MCHC RBC-ENTMCNC: 33.8 GM/DL — SIGNIFICANT CHANGE UP (ref 32–36)
MCV RBC AUTO: 86.1 FL — SIGNIFICANT CHANGE UP (ref 80–100)
NRBC # BLD: 0 /100 WBCS — SIGNIFICANT CHANGE UP (ref 0–0)
PLATELET # BLD AUTO: 226 K/UL — SIGNIFICANT CHANGE UP (ref 150–400)
POTASSIUM SERPL-MCNC: 3.9 MMOL/L — SIGNIFICANT CHANGE UP (ref 3.5–5.3)
POTASSIUM SERPL-SCNC: 3.9 MMOL/L — SIGNIFICANT CHANGE UP (ref 3.5–5.3)
RBC # BLD: 4.02 M/UL — SIGNIFICANT CHANGE UP (ref 3.8–5.2)
RBC # FLD: 14 % — SIGNIFICANT CHANGE UP (ref 10.3–14.5)
SODIUM SERPL-SCNC: 142 MMOL/L — SIGNIFICANT CHANGE UP (ref 135–145)
WBC # BLD: 3.89 K/UL — SIGNIFICANT CHANGE UP (ref 3.8–10.5)
WBC # FLD AUTO: 3.89 K/UL — SIGNIFICANT CHANGE UP (ref 3.8–10.5)

## 2022-03-08 PROCEDURE — 71275 CT ANGIOGRAPHY CHEST: CPT | Mod: 26

## 2022-03-08 PROCEDURE — 99232 SBSQ HOSP IP/OBS MODERATE 35: CPT

## 2022-03-08 RX ADMIN — Medication 0.5 MILLIGRAM(S): at 14:40

## 2022-03-08 RX ADMIN — Medication 25 MICROGRAM(S): at 05:09

## 2022-03-08 RX ADMIN — LAMOTRIGINE 150 MILLIGRAM(S): 25 TABLET, ORALLY DISINTEGRATING ORAL at 21:01

## 2022-03-08 RX ADMIN — APIXABAN 10 MILLIGRAM(S): 2.5 TABLET, FILM COATED ORAL at 17:40

## 2022-03-08 RX ADMIN — SENNA PLUS 2 TABLET(S): 8.6 TABLET ORAL at 21:01

## 2022-03-08 RX ADMIN — Medication 0.5 MILLIGRAM(S): at 21:02

## 2022-03-08 RX ADMIN — LAMOTRIGINE 125 MILLIGRAM(S): 25 TABLET, ORALLY DISINTEGRATING ORAL at 12:25

## 2022-03-08 RX ADMIN — APIXABAN 10 MILLIGRAM(S): 2.5 TABLET, FILM COATED ORAL at 05:09

## 2022-03-08 RX ADMIN — Medication 0.5 MILLIGRAM(S): at 05:10

## 2022-03-08 RX ADMIN — Medication 81 MILLIGRAM(S): at 12:24

## 2022-03-08 RX ADMIN — QUETIAPINE FUMARATE 200 MILLIGRAM(S): 200 TABLET, FILM COATED ORAL at 21:02

## 2022-03-08 NOTE — PROGRESS NOTE ADULT - ASSESSMENT
70 y/o female     with PMHx of depression and anxiety  pt admits  to prior  suicidal attempt, trs  ago/   states  she is divorved  and celis s no children           was brought in by ambulance for being found on ground by landlord.   ct  head,  normal  She was found to be positive for PCP.  admits to taking  about 90 pills of ativan   U tox positive for PCP/  pt  denies  using any  drugs    h/o depression, anxiety   pt   is  alert,  able  to answer  all questions   psych eval,  told  pt needs transfer  to   pysch  facility/  form  filled/ S/W  awarew   abnormal  ekg.  st  depressions/ echo, normal  ef   per  card, need s ischemic  w/p/     awiat card  clearance   prior to  transfer to psych facility   on dvt ppx /   cardiac ct,  some  cad    PE  noted,  hence on eliquis, awiating dopple r legs  and  ct angio  chest   rib fx's  , from fall noted on imaging

## 2022-03-08 NOTE — BH CONSULTATION LIAISON PROGRESS NOTE - NSBHCONSULTRECOMMENDOTHER_PSY_A_CORE FT
c/w Lamictal 125mg PO daily and 150mg PO qHS    Reduce Ativan to 0.25mg PO qAM, 0.5mg PO qafternoon, and 0.5mg PO qHS    C/w Seroquel 200mg PO qhS     2PC to inpt psych when medically cleared c/w Lamictal 125mg PO daily and 150mg PO qHS    Reduce Ativan to 0.25mg PO qAM, 0.5mg PO qafternoon, and 0.5mg PO qHS    Increase Seroquel to 225mg PO qhS     2PC to inpt psych when medically cleared

## 2022-03-08 NOTE — BH CONSULTATION LIAISON PROGRESS NOTE - NSBHASSESSMENTFT_PSY_ALL_CORE
71F , noncaregiver, retired , domiciled alone, with PPHx bipolar d/o, 2 psych admissions (1987, 1992), remote h/o SA via OD, no h/o substance abuse, with no significant PMH, a/w AMS, later disclosed to staff she had overdosed on 90 tabs Ativan as a suicide attempt, psychiatry consulted for suicidality.  Pt AOx4 on interview, reports several months of worsening depression culminating in SA via OD, left her journal and signed checks as part of her preprations.  States she took 90 tablets of ativan 0.5 mg, 2 tabs Seroquel 200mg and 2 tabs Lamictal 100mg.  Feels upset the SA was not successful, also endorsing persecutory delusions that the devil is after her, if bad things happen it is because of the devil.  Denies HI or substance abuse.   71F , noncaregiver, retired , domiciled alone, with PPHx bipolar d/o, 2 psych admissions (1987, 1992), remote h/o SA via OD, no h/o substance abuse, with no significant PMH, a/w AMS, later disclosed to staff she had overdosed on 90 tabs Ativan as a suicide attempt, psychiatry consulted for suicidality.  Pt AOx4 on interview, reports several months of worsening depression culminating in SA via OD, left her journal and signed checks as part of her preparations.  States she took 90 tablets of ativan 0.5 mg, 2 tabs Seroquel 200mg and 2 tabs Lamictal 100mg.  Feels upset the SA was not successful, also endorsing persecutory delusions that the devil is after her, if bad things happen it is because of the devil.  Denies HI or substance abuse.

## 2022-03-09 ENCOUNTER — TRANSCRIPTION ENCOUNTER (OUTPATIENT)
Age: 72
End: 2022-03-09

## 2022-03-09 LAB
ANION GAP SERPL CALC-SCNC: 7 MMOL/L — SIGNIFICANT CHANGE UP (ref 5–17)
BUN SERPL-MCNC: 8 MG/DL — SIGNIFICANT CHANGE UP (ref 7–23)
CALCIUM SERPL-MCNC: 9 MG/DL — SIGNIFICANT CHANGE UP (ref 8.4–10.5)
CHLORIDE SERPL-SCNC: 108 MMOL/L — SIGNIFICANT CHANGE UP (ref 96–108)
CO2 SERPL-SCNC: 27 MMOL/L — SIGNIFICANT CHANGE UP (ref 22–31)
CREAT SERPL-MCNC: 0.68 MG/DL — SIGNIFICANT CHANGE UP (ref 0.5–1.3)
EGFR: 94 ML/MIN/1.73M2 — SIGNIFICANT CHANGE UP
GLUCOSE SERPL-MCNC: 105 MG/DL — HIGH (ref 70–99)
HCT VFR BLD CALC: 33.5 % — LOW (ref 34.5–45)
HGB BLD-MCNC: 11.3 G/DL — LOW (ref 11.5–15.5)
MCHC RBC-ENTMCNC: 29 PG — SIGNIFICANT CHANGE UP (ref 27–34)
MCHC RBC-ENTMCNC: 33.7 GM/DL — SIGNIFICANT CHANGE UP (ref 32–36)
MCV RBC AUTO: 85.9 FL — SIGNIFICANT CHANGE UP (ref 80–100)
NRBC # BLD: 0 /100 WBCS — SIGNIFICANT CHANGE UP (ref 0–0)
PLATELET # BLD AUTO: 243 K/UL — SIGNIFICANT CHANGE UP (ref 150–400)
POTASSIUM SERPL-MCNC: 3.6 MMOL/L — SIGNIFICANT CHANGE UP (ref 3.5–5.3)
POTASSIUM SERPL-SCNC: 3.6 MMOL/L — SIGNIFICANT CHANGE UP (ref 3.5–5.3)
RBC # BLD: 3.9 M/UL — SIGNIFICANT CHANGE UP (ref 3.8–5.2)
RBC # FLD: 14.2 % — SIGNIFICANT CHANGE UP (ref 10.3–14.5)
SODIUM SERPL-SCNC: 142 MMOL/L — SIGNIFICANT CHANGE UP (ref 135–145)
WBC # BLD: 3.73 K/UL — LOW (ref 3.8–10.5)
WBC # FLD AUTO: 3.73 K/UL — LOW (ref 3.8–10.5)

## 2022-03-09 PROCEDURE — 99222 1ST HOSP IP/OBS MODERATE 55: CPT | Mod: GC

## 2022-03-09 PROCEDURE — 93970 EXTREMITY STUDY: CPT | Mod: 26

## 2022-03-09 RX ORDER — QUETIAPINE FUMARATE 200 MG/1
1 TABLET, FILM COATED ORAL
Qty: 0 | Refills: 0 | DISCHARGE
Start: 2022-03-09

## 2022-03-09 RX ORDER — HEPARIN SODIUM 5000 [USP'U]/ML
5000 INJECTION INTRAVENOUS; SUBCUTANEOUS EVERY 12 HOURS
Refills: 0 | Status: DISCONTINUED | OUTPATIENT
Start: 2022-03-09 | End: 2022-03-09

## 2022-03-09 RX ORDER — LAMOTRIGINE 25 MG/1
5 TABLET, ORALLY DISINTEGRATING ORAL
Qty: 0 | Refills: 0 | DISCHARGE
Start: 2022-03-09

## 2022-03-09 RX ORDER — LAMOTRIGINE 25 MG/1
1 TABLET, ORALLY DISINTEGRATING ORAL
Qty: 0 | Refills: 0 | DISCHARGE
Start: 2022-03-09

## 2022-03-09 RX ORDER — LEVOTHYROXINE SODIUM 125 MCG
1 TABLET ORAL
Qty: 0 | Refills: 0 | DISCHARGE
Start: 2022-03-09

## 2022-03-09 RX ORDER — ASPIRIN/CALCIUM CARB/MAGNESIUM 324 MG
1 TABLET ORAL
Qty: 0 | Refills: 0 | DISCHARGE
Start: 2022-03-09

## 2022-03-09 RX ADMIN — HEPARIN SODIUM 5000 UNIT(S): 5000 INJECTION INTRAVENOUS; SUBCUTANEOUS at 18:16

## 2022-03-09 RX ADMIN — Medication 0.5 MILLIGRAM(S): at 13:10

## 2022-03-09 RX ADMIN — Medication 0.5 MILLIGRAM(S): at 21:01

## 2022-03-09 RX ADMIN — LAMOTRIGINE 125 MILLIGRAM(S): 25 TABLET, ORALLY DISINTEGRATING ORAL at 12:20

## 2022-03-09 RX ADMIN — QUETIAPINE FUMARATE 200 MILLIGRAM(S): 200 TABLET, FILM COATED ORAL at 21:02

## 2022-03-09 RX ADMIN — Medication 25 MICROGRAM(S): at 05:04

## 2022-03-09 RX ADMIN — Medication 81 MILLIGRAM(S): at 13:10

## 2022-03-09 RX ADMIN — Medication 0.5 MILLIGRAM(S): at 05:04

## 2022-03-09 RX ADMIN — LAMOTRIGINE 150 MILLIGRAM(S): 25 TABLET, ORALLY DISINTEGRATING ORAL at 21:02

## 2022-03-09 RX ADMIN — APIXABAN 10 MILLIGRAM(S): 2.5 TABLET, FILM COATED ORAL at 05:04

## 2022-03-09 NOTE — CONSULT NOTE ADULT - ATTENDING COMMENTS
I have examined pt and agree with above exam and plan above.  70 yo female with h/o smoking who was found to have subsegmental KARISHMA PE on CCTA. Pt then had a dedicated CTA that did not reveal a PE. In addition , pt had LE ultrasound dopplers that did not show DVT  bilaterally. Pt is c/o mild sob where she fell and fractured ribs. No hemoptysis.  No prior h/o thromboembolism.  On exam, pt vaping in bed  in NAD. Hemodynamically stable  Lungs clear to A . no accessory muscle use.      A/P  1. Dedicated CTA  and Lower extremity ultrasound/doppler show no evidence of thromboembolic disease. In my opinion pt does not need anticoagulation.   2. Rib fractures. Pain medication prn  3. Nicotine addiction. Encourage pt to stop vaping and refer to [facility] control [place] for nicotine addiction.

## 2022-03-09 NOTE — DISCHARGE NOTE PROVIDER - HOSPITAL COURSE
72 y/o female     with PMHx of depression and anxiety  pt admits  to prior  suicidal attempt, trs  ago/   states  she is divorved  and celis s no children           was brought in by ambulance for being found on ground by landlord.   ct  head,  normal  She was found to be positive for PCP.  admits to taking  about 90 pills of ativan   U tox positive for PCP/  pt  denies  using any  drugs    h/o depression, anxiety   pt   is  alert,  able  to answer  all questions   psych eval,  told  pt needs transfer  to   Psychiatric  facility/  form  filled/ S/W  awarew   abnormal  ekg.  st  depressions/ echo, normal  ef   per  card, need s ischemic  w/p/     awiat card  clearance   prior to  transfer to psych facility   on dvt ppx /   cardiac ct,  some  cad/ rib fx's, from fall    PE  noted,  hence on eliquis, le doppler negative      ct angio  chest, no PE/     house pulm  [  ]    clinically  pt  padilla s not appear  to have a  PE and  the  2  studies read by  radiologists   are confounding/    discussed  with emerson marcelo,,  plan will  be  to stop eliquis   after seen by pulm .  may  start   d/c planning /  a s pt ha s been cleared   for transfer to [facility] facility      72 y/o female     with PMHx of depression and anxiety  pt admits  to prior  suicidal attempt, trs  ago/   states  she is divorved  and celis s no children           was brought in by ambulance for being found on ground by landlord.   ct  head,  normal  She was found to be positive for PCP.  admits to taking  about 90 pills of ativan   U tox positive for PCP/  pt  denies  using any  drugs    h/o depression, anxiety   pt   is  alert,  able  to answer  all questions   psych eval,  told  pt needs transfer  to   [facility]  facility/  form  filled/ S/W  awarew   abnormal  ekg.  st  depressions/ echo, normal  ef   per  card, need s ischemic  w/p/     awiat card  clearance   prior to  transfer to [facility] facility   on dvt ppx /   cardiac ct,  some  cad/ rib fx's, from fall    PE  noted,  hence on eliquis, le doppler negative      ct angio  chest, no PE/     house pulm consulted. no need for AC . Eliquis stopped   clinically  pt  padilla s not appear  to have a  PE and  the  2  studies read by  radiologists   are confounding/    discussed  with emerson marcelo,   cleared   for transfer to [facility] facility

## 2022-03-09 NOTE — CONSULT NOTE ADULT - ASSESSMENT
70 y/o female with PMHx of depression and anxiety was brought in by ambulance for being found on ground by landlord. Pt states she remembers feeling dizzy and falling down a set of stairs. Patient had a CCTA on 3/7 showing "left upper lobe subsegmental pulmonary arterial embolus seen within partially imaged lungs." Patient then had a dedicated CTA Chest PE protocol for further evaluation showing no pulmonary embolism. Pulmonary consulted for question of pulmonary embolism not seen on dedicated angiogram.     Recs:     # Questionable PE  # Rib fractures s/p fall   - Dedicated CTA Chest PE protocol with IV contrast was negative for PE, suggesting that CT protocoled for heart and coronaries was not accurate. LE Duplex was negative for DVT. Patient denies any symptoms of PE. Recommend stopping anticoagulation.   - Spoke to patient about vaping and smoking cessation.   - Management of rib fractures as per primary team.
72 y/o female with PMHx of depression and anxiety was brought in by ambulance for being found on ground by landlord. Pt states she remembers feeling dizzy and falling down a set of stairs. She does not recall being found by her landlord. She also states her psych meds were recently being adjusted. Her Lexapro had been discontinued, then re-instated about 3-4 weeks ago. Since then she reported feeling more dizzy than usual. She also takes Seroquel and ativan. She endorsed depressed mood but denies any suicidal ideation or hallucination. Further history was limited as patient was lethargic.  Vitals: afebrile, HR 87, /70, SpO2 96% on room air.  pt with hx of depression/ anxiety who was found on the floor.  pt claims to take extra xanax  ecg with sig abnormality  echo to check wall motion  check orthostatic bp  tsh  lipid   asa daily  psych eval  neuro eval noted

## 2022-03-09 NOTE — CONSULT NOTE ADULT - SUBJECTIVE AND OBJECTIVE BOX
CHIEF COMPLAINT:Patient is a 71y old  Female who presents with a chief complaint of altered mental status (03 Mar 2022 11:36)      HPI:  72 y/o female with PMHx of depression and anxiety was brought in by ambulance for being found on ground by landlord. Pt states she remembers feeling dizzy and falling down a set of stairs. She does not recall being found by her landlord. She also states her psych meds were recently being adjusted. Her Lexapro had been discontinued, then re-instated about 3-4 weeks ago. Since then she reported feeling more dizzy than usual. She also takes Seroquel and ativan. She endorsed depressed mood but denies any suicidal ideation or hallucination. Further history was limited as patient was lethargic.  Vitals: afebrile, HR 87, /70, SpO2 96% on room air.  Labs: CBC unremarkable. CMP shows metabolic acidosis. . TSH wnl. U/A shows concentrated urine with ketones. No UTI. U tox shows positive PCP.  X ray shoulder- no fracture or dislocation.  CXR- clear lungs  X ray pelvis: no fractures  CT brain: no ICH  CT c-spine: no fracture. Multilevel spondylosis.        PAST MEDICAL & SURGICAL HISTORY:  Anxiety and depression    No significant past surgical history        MEDICATIONS  (STANDING):  heparin   Injectable 5000 Unit(s) SubCutaneous every 8 hours  lactated ringers. 1000 milliLiter(s) (75 mL/Hr) IV Continuous <Continuous>  lamoTRIgine 125 milliGRAM(s) Oral daily  lamoTRIgine 150 milliGRAM(s) Oral at bedtime    MEDICATIONS  (PRN):      FAMILY HISTORY:  No pertinent family history in first degree relatives        SOCIAL HISTORY:    [ ] Non-smoker  [ ] Smoker  [ ] Alcohol    Allergies    sulfa drugs (Unknown)    Intolerances    	    REVIEW OF SYSTEMS:  CONSTITUTIONAL: No fever, weight loss, or fatigue  EYES: No eye pain, visual disturbances, or discharge  ENT:  No difficulty hearing, tinnitus, vertigo; No sinus or throat pain  NECK: No pain or stiffness  RESPIRATORY: No cough, wheezing, chills or hemoptysis; No Shortness of Breath  CARDIOVASCULAR: No chest pain, palpitations, passing out, dizziness, or leg swelling  GASTROINTESTINAL: No abdominal or epigastric pain. No nausea, vomiting, or hematemesis; No diarrhea or constipation. No melena or hematochezia.  GENITOURINARY: No dysuria, frequency, hematuria, or incontinence  NEUROLOGICAL: No headaches, memory loss, loss of strength, numbness, or tremors  SKIN: No itching, burning, rashes, or lesions   LYMPH Nodes: No enlarged glands  ENDOCRINE: No heat or cold intolerance; No hair loss  MUSCULOSKELETAL: No joint pain or swelling; No muscle, back, or extremity pain  PSYCHIATRIC: No depression, anxiety, mood swings, or difficulty sleeping  HEME/LYMPH: No easy bruising, or bleeding gums  ALLERGY AND IMMUNOLOGIC: No hives or eczema	    [ ] All others negative	  [ ] Unable to obtain    PHYSICAL EXAM:  T(C): 37.1 (03-03-22 @ 16:39), Max: 37.1 (03-03-22 @ 09:51)  HR: 76 (03-03-22 @ 16:39) (75 - 92)  BP: 102/69 (03-03-22 @ 16:39) (97/57 - 114/83)  RR: 18 (03-03-22 @ 16:39) (18 - 18)  SpO2: 99% (03-03-22 @ 16:39) (95% - 100%)  Wt(kg): --  I&O's Summary    03 Mar 2022 07:01  -  03 Mar 2022 19:34  --------------------------------------------------------  IN: 1540 mL / OUT: 450 mL / NET: 1090 mL        Appearance: Normal	  HEENT:   Normal oral mucosa, PERRL, EOMI	  Lymphatic: No lymphadenopathy  Cardiovascular: Normal S1 S2, No JVD, + murmurs, No edema  Respiratory: rhonchi  Gastrointestinal:  Soft, Non-tender, + BS	  Skin: No rashes, No ecchymoses, No cyanosis	  Neurologic: Non-focal  Extremities: Normal range of motion, No clubbing, cyanosis or edema  Vascular: Peripheral pulses palpable 2+ bilaterally    TELEMETRY: 	    ECG:  	  RADIOLOGY:  OTHER: 	  	  LABS:	 	    CARDIAC MARKERS:  CARDIAC MARKERS ( 02 Mar 2022 19:42 )  x     / x     / 334 U/L / x     / x                                  13.6   5.52  )-----------( 240      ( 03 Mar 2022 11:23 )             38.5     03-03    135  |  102  |  17  ----------------------------<  102<H>  3.6   |  19<L>  |  0.56    Ca    9.2      03 Mar 2022 11:23    TPro  6.4  /  Alb  3.9  /  TBili  1.1  /  DBili  x   /  AST  34  /  ALT  17  /  AlkPhos  72  03-02    proBNP:   Lipid Profile:   HgA1c:   TSH: Thyroid Stimulating Hormone, Serum: 4.05 uIU/mL (03-03 @ 00:56)    PT/INR - ( 02 Mar 2022 19:42 )   PT: 15.7 sec;   INR: 1.36 ratio         PTT - ( 02 Mar 2022 19:42 )  PTT:25.8 sec    PREVIOUS DIAGNOSTIC TESTING:    < from: 12 Lead ECG (03.02.22 @ 22:13) >  Diagnosis Line NORMAL SINUS RHYTHM  SEPTAL INFARCT , AGE UNDETERMINED  T WAVE ABNORMALITY, CONSIDER INFERIOR ISCHEMIA  T WAVE ABNORMALITY, CONSIDER ANTEROLATERAL ISCHEMIA  ABNORMAL ECG  NO PREVIOUS ECGS AVAILABLE    < from: CT Angio Neck w/ IV Cont (03.02.22 @ 20:58) >  CTA BRAIN: Patent intracranial circulation. No flow-limiting stenosis or   occlusion.    CTA NECK: Patent cervical vasculature. No flow limiting stenosis or   occlusion.    < from: Xray Chest 1 View- PORTABLE-Urgent (Xray Chest 1 View- PORTABLE-Urgent .) (03.02.22 @ 20:40) >  Clear lungs.    No acute displaced fracture is seen.            
CHIEF COMPLAINT: AMS     HPI:  72 y/o female with PMHx of depression and anxiety was brought in by ambulance for being found on ground by landlord. Pt states she remembers feeling dizzy and falling down a set of stairs. She does not recall being found by her landlord. She also states her psych meds were recently being adjusted. Her Lexapro had been discontinued, then re-instated about 3-4 weeks ago. Since then she reported feeling more dizzy than usual. She also takes Seroquel and ativan. She endorsed depressed mood but denies any suicidal ideation or hallucination. Further history was limited as patient was lethargic.    Vitals: afebrile, HR 87, /70, SpO2 96% on room air.  Labs: CBC unremarkable. CMP shows metabolic acidosis. . TSH wnl. U/A shows concentrated urine with ketones. No UTI. U tox shows positive PCP. X ray shoulder- no fracture or dislocation. CXR- clear lungs. X ray pelvis: no fractures. CT brain: no ICH. CT c-spine: no fracture. Multilevel spondylosis. ED intervention: LR 1L bolus. CTA brain and neck: no flow limiting stenosis or occlusion.    Patient had a CCTA on 3/7 showing "left upper lobe subsegmental pulmonary arterial embolus seen within partially imaged lungs." Patient then had a dedicated CTA Chest PE protocol for further evaluation showing no pulmonary embolism.     Pulmonary consulted for pulmonary embolism.     PAST MEDICAL & SURGICAL HISTORY:  Anxiety and depression    No significant past surgical history        FAMILY HISTORY:  No pertinent family history in first degree relatives        SOCIAL HISTORY:  Smoking: Active smoker - vape   Substance Use: [ ] Never Used [x ] Used ____  EtOH Use:  Marital Status: [ ] Single [ ]  [ ]  [ ]   Sexual History:   Occupation:  Recent Travel:  Country of Birth:  Advance Directives:    Allergies    sulfa drugs (Unknown)    Intolerances        HOME MEDICATIONS:  Home Medications:  Ativan:  (03 Mar 2022 07:29)  Lexapro:  (03 Mar 2022 07:29)  SEROquel:  (03 Mar 2022 07:29)      REVIEW OF SYSTEMS:  Constitutional: [ ] negative [ ] fevers [ ] chills [ ] weight loss [ ] weight gain  HEENT: [ ] negative [ ] dry eyes [ ] eye irritation [ ] postnasal drip [ ] nasal congestion  CV: [ ] negative  [ ] chest pain [ ] orthopnea [ ] palpitations [ ] murmur  Resp: [x ] negative [ ] cough [ ] shortness of breath [ ] dyspnea [ ] wheezing [ ] sputum [ ] hemoptysis  GI: [ ] negative [ ] nausea [ ] vomiting [ ] diarrhea [ ] constipation [ ] abd pain [ ] dysphagia   : [ ] negative [ ] dysuria [ ] nocturia [ ] hematuria [ ] increased urinary frequency  Musculoskeletal: [ ] negative [ ] back pain [ ] myalgias [ ] arthralgias [ ] fracture  Skin: [ ] negative [ ] rash [ ] itch  Neurological: [ ] negative [ ] headache [ ] dizziness [ ] syncope [ ] weakness [ ] numbness  Psychiatric: [ ] negative [ ] anxiety [ ] depression  Endocrine: [ ] negative [ ] diabetes [ ] thyroid problem  Hematologic/Lymphatic: [ ] negative [ ] anemia [ ] bleeding problem  Allergic/Immunologic: [ ] negative [ ] itchy eyes [ ] nasal discharge [ ] hives [ ] angioedema  [ ] All other systems negative  [ ] Unable to assess ROS because ________    OBJECTIVE:  ICU Vital Signs Last 24 Hrs  T(C): 37.1 (09 Mar 2022 13:05), Max: 37.1 (09 Mar 2022 05:02)  T(F): 98.7 (09 Mar 2022 13:05), Max: 98.8 (09 Mar 2022 05:02)  HR: 90 (09 Mar 2022 13:05) (72 - 90)  BP: 136/91 (09 Mar 2022 13:05) (110/75 - 153/89)  BP(mean): --  ABP: --  ABP(mean): --  RR: 18 (09 Mar 2022 13:05) (18 - 18)  SpO2: 96% (09 Mar 2022 13:05) (96% - 99%)        03-08 @ 07:01  -  03-09 @ 07:00  --------------------------------------------------------  IN: 470 mL / OUT: 800 mL / NET: -330 mL    03-09 @ 07:01  -  03-09 @ 16:57  --------------------------------------------------------  IN: 940 mL / OUT: 0 mL / NET: 940 mL      CAPILLARY BLOOD GLUCOSE          PHYSICAL EXAM:  General: well nourished male in NAD, laying in bed vaping.   HEENT: NCAT  Neck: full range of motion    Respiratory: clear to auscultation bilaterally   Cardiovascular: regular rate and rhythm   Abdomen: soft, nontender, nondistended abdomen   Extremities: no LE edema   Skin: warm, normal color   Neurological: alert and oriented, no focal deficits   Psychiatry: cooperative     LINES:     HOSPITAL MEDICATIONS:  Standing Meds:  apixaban 10 milliGRAM(s) Oral two times a day  aspirin enteric coated 81 milliGRAM(s) Oral daily  lamoTRIgine 125 milliGRAM(s) Oral daily  lamoTRIgine 150 milliGRAM(s) Oral at bedtime  levothyroxine 25 MICROGram(s) Oral daily  LORazepam     Tablet 0.5 milliGRAM(s) Oral three times a day  QUEtiapine 200 milliGRAM(s) Oral at bedtime      PRN Meds:      LABS:                        11.3   3.73  )-----------( 243      ( 09 Mar 2022 07:13 )             33.5     Hgb Trend: 11.3<--, 11.7<--, 11.4<--, 11.5<--, 13.6<--  03-09    142  |  108  |  8   ----------------------------<  105<H>  3.6   |  27  |  0.68    Ca    9.0      09 Mar 2022 07:09      Creatinine Trend: 0.68<--, 0.64<--, 0.53<--, 0.53<--, 0.56<--, 0.84<--            MICROBIOLOGY:       RADIOLOGY:    < from: CT Angio Heart and Coronaries w/ IV Cont (03.07.22 @ 14:35) >  3. Left upper lobe subsegmental pulmonary arterial embolus seen within   the partially imaged lungs. A dedicated CT pulmonary angiogram is   recommended for complete evaluation.  4. Acute appearing fractures of the right fifth and sixth ribs.    Findings discussed with physician assistant Santo on March 7, 2022 at   11:16 PM with read back.    --- End of Report ---    < end of copied text >    < from: CT Angio Chest PE Protocol w/ IV Cont (03.08.22 @ 13:52) >  IMPRESSION:    No pulmonary embolism.    --- End of Report ---    < end of copied text >    < from: VA Duplex Lower Ext Vein Scan, Bilat (03.09.22 @ 15:01) >  IMPRESSION:  No evidence of deep venous thrombosis in either lower extremity.      < end of copied text >      [ ] Reviewed and interpreted by me    PULMONARY FUNCTION TESTS:    EKG:

## 2022-03-09 NOTE — DISCHARGE NOTE PROVIDER - NSDCCPCAREPLAN_GEN_ALL_CORE_FT
PRINCIPAL DISCHARGE DIAGNOSIS  Diagnosis: AMS (altered mental status)  Assessment and Plan of Treatment: Drug induced, resolved.   Follow up treatment at [facility]      SECONDARY DISCHARGE DIAGNOSES  Diagnosis: Anxiety and depression  Assessment and Plan of Treatment: Follow up treatment at [facility]    Diagnosis: Hypothyroid  Assessment and Plan of Treatment: Continue synthroid. Follow up with PMD for repeat labs in 4-6 weeks    Diagnosis: CAD (coronary artery disease)  Assessment and Plan of Treatment: CTA without significant disease  Coronary artery disease is a condition where the arteries the supply the heart muscle get clogged with fatty deposits & puts you at risk for a heart attack  Call your doctor if you have any new pain, pressure, or discomfort in the center of your chest, pain, tingling or discomfort in arms, back, neck, jaw, or stomach, shortness of breath, nausea, vomiting, burping or heartburn, sweating, cold and clammy skin, racing or abnormal heartbeat for more than 10 minutes or if they keep coming & going.  Call 911 and do not tr to get to hospital by care  You can help yourself with lefestyle changes (quitting smoking if you smoke), eat lots of fruits & vegetables & low fat dairy products, not a lot of meat & fatty foods, walk or some form of physical activity most days of the week, lose weight if you are overweight  Take your cardiac medication as prescribed to lower cholesterol, to lower blood pressure, aspirin to prevent blood clots, and diabetes control  Make sure to keep appointments with doctor for cardiac follow up care

## 2022-03-09 NOTE — DISCHARGE NOTE PROVIDER - NSDCMRMEDTOKEN_GEN_ALL_CORE_FT
aspirin 81 mg oral delayed release tablet: 1 tab(s) orally once a day  lamoTRIgine 150 mg oral tablet: 1 tab(s) orally once a day (at bedtime)  lamoTRIgine 25 mg oral tablet: 5 tab(s) orally once a day  levothyroxine 25 mcg (0.025 mg) oral tablet: 1 tab(s) orally once a day  Lexapro:   LORazepam 0.5 mg oral tablet: 1 tab(s) orally 3 times a day  QUEtiapine 200 mg oral tablet: 1 tab(s) orally once a day (at bedtime)  SEROquel:    aspirin 81 mg oral delayed release tablet: 1 tab(s) orally once a day  lamoTRIgine 150 mg oral tablet: 1 tab(s) orally once a day (at bedtime)  lamoTRIgine 25 mg oral tablet: 5 tab(s) orally once a day  levothyroxine 25 mcg (0.025 mg) oral tablet: 1 tab(s) orally once a day  LORazepam 0.5 mg oral tablet: 1 tab(s) orally 3 times a day  QUEtiapine 200 mg oral tablet: 1 tab(s) orally once a day (at bedtime)

## 2022-03-09 NOTE — DISCHARGE NOTE PROVIDER - CARE PROVIDER_API CALL
Janet Juan  CARDIOVASCULAR DISEASE  [address], [address]  [place] [address]  Phone: [phone]  Fax: [phone]  Follow Up Time:

## 2022-03-09 NOTE — PROGRESS NOTE ADULT - ASSESSMENT
72 y/o female     with PMHx of depression and anxiety  pt admits  to prior  suicidal attempt, trs  ago/   states  she is divorved  and celis s no children           was brought in by ambulance for being found on ground by landlord.   ct  head,  normal  She was found to be positive for PCP.  admits to taking  about 90 pills of ativan   U tox positive for PCP/  pt  denies  using any  drugs    h/o depression, anxiety   pt   is  alert,  able  to answer  all questions   psych eval,  told  pt needs transfer  to   pysch  facility/  form  filled/ S/W  awarew   abnormal  ekg.  st  depressions/ echo, normal  ef   per  card, need s ischemic  w/p/     [facility] card  clearance   prior to  transfer to psych facility   on dvt ppx /   cardiac ct,  some  cad/ rib fx's, from fall    PE  noted,  hence on eliquis, awiating  doppler  r legs      ct angio  chest, no PE/     will  [facility]  pulm eval/  clarifiaction,  regarding PE    clinically  pt  padilla s not appear  to have a  PE   and  the  2  studies  are confounding   r       70 y/o female     with PMHx of depression and anxiety  pt admits  to prior  suicidal attempt, trs  ago/   states  she is divorved  and celis s no children           was brought in by ambulance for being found on ground by landlord.   ct  head,  normal  She was found to be positive for PCP.  admits to taking  about 90 pills of ativan   U tox positive for PCP/  pt  denies  using any  drugs    h/o depression, anxiety   pt   is  alert,  able  to answer  all questions   psych eval,  told  pt needs transfer  to   pysch  facility/  form  filled/ S/W  awarew   abnormal  ekg.  st  depressions/ echo, normal  ef   per  card, need s ischemic  w/p/     [facility] card  clearance   prior to  transfer to psych facility   on dvt ppx /   cardiac ct,  some  cad/ rib fx's, from fall    PE  noted,  hence on eliquis, awiating  doppler  r legs      ct angio  chest, no PE/     will  [facility]  pulm eval/  clarifiaction,  regarding PE    clinically  pt  padilla s not appear  to have a  PE   and  the  2  studies  are confounding/  discussed  with emerson marcelo,  may  start   d/c planning  , upon stopping  eliquis   r       70 y/o female     with PMHx of depression and anxiety  pt admits  to prior  suicidal attempt, trs  ago/   states  she is divorved  and celis s no children           was brought in by ambulance for being found on ground by landlord.   ct  head,  normal  She was found to be positive for PCP.  admits to taking  about 90 pills of ativan   U tox positive for PCP/  pt  denies  using any  drugs    h/o depression, anxiety   pt   is  alert,  able  to answer  all questions   psych eval,  told  pt needs transfer  to   [facility]  facility/  form  filled/ S/W  awarew   abnormal  ekg.  st  depressions/ echo, normal  ef   per  card, need s ischemic  w/p/     [facility] card  clearance   prior to  transfer to psych facility   on dvt ppx /   cardiac ct,  some  cad/ rib fx's, from fall    PE  noted,  hence on eliquis, awiating  doppler  r legs      ct angio  chest, no PE/     will  [facility]  pulm eval/  clarifiaction,  regarding PE    clinically  pt  padilla s not appear  to have a  PE and  the  2  studies read by  radiologists   are confounding/    discussed  with emerson marcelo,,  plan will  be  to stop eliquis   after seen by pulm .  may  start   d/c planning /  a s pt ha s been cleared   for transfer to psych facility

## 2022-03-10 ENCOUNTER — TRANSCRIPTION ENCOUNTER (OUTPATIENT)
Age: 72
End: 2022-03-10

## 2022-03-10 ENCOUNTER — INPATIENT (INPATIENT)
Facility: HOSPITAL | Age: 72
LOS: 41 days | Discharge: NURSING, REHAB OR LONG-TERM CARE | End: 2022-04-21
Attending: PSYCHIATRY & NEUROLOGY | Admitting: PSYCHIATRY & NEUROLOGY
Payer: MEDICARE

## 2022-03-10 VITALS — OXYGEN SATURATION: 98 % | RESPIRATION RATE: 18 BRPM | WEIGHT: 136.03 LBS | HEIGHT: 62 IN | TEMPERATURE: 99 F

## 2022-03-10 VITALS
SYSTOLIC BLOOD PRESSURE: 151 MMHG | RESPIRATION RATE: 18 BRPM | OXYGEN SATURATION: 97 % | HEART RATE: 85 BPM | TEMPERATURE: 98 F | DIASTOLIC BLOOD PRESSURE: 78 MMHG

## 2022-03-10 DIAGNOSIS — F32.9 MAJOR DEPRESSIVE DISORDER, SINGLE EPISODE, UNSPECIFIED: ICD-10-CM

## 2022-03-10 LAB — SARS-COV-2 RNA SPEC QL NAA+PROBE: SIGNIFICANT CHANGE UP

## 2022-03-10 PROCEDURE — 70498 CT ANGIOGRAPHY NECK: CPT | Mod: MA

## 2022-03-10 PROCEDURE — 82803 BLOOD GASES ANY COMBINATION: CPT

## 2022-03-10 PROCEDURE — 85014 HEMATOCRIT: CPT

## 2022-03-10 PROCEDURE — 93970 EXTREMITY STUDY: CPT

## 2022-03-10 PROCEDURE — 70496 CT ANGIOGRAPHY HEAD: CPT | Mod: MA

## 2022-03-10 PROCEDURE — 87086 URINE CULTURE/COLONY COUNT: CPT

## 2022-03-10 PROCEDURE — U0005: CPT

## 2022-03-10 PROCEDURE — 82565 ASSAY OF CREATININE: CPT

## 2022-03-10 PROCEDURE — 70450 CT HEAD/BRAIN W/O DYE: CPT | Mod: MA

## 2022-03-10 PROCEDURE — 75574 CT ANGIO HRT W/3D IMAGE: CPT

## 2022-03-10 PROCEDURE — 82962 GLUCOSE BLOOD TEST: CPT

## 2022-03-10 PROCEDURE — 86780 TREPONEMA PALLIDUM: CPT

## 2022-03-10 PROCEDURE — 99285 EMERGENCY DEPT VISIT HI MDM: CPT

## 2022-03-10 PROCEDURE — 72170 X-RAY EXAM OF PELVIS: CPT

## 2022-03-10 PROCEDURE — 97161 PT EVAL LOW COMPLEX 20 MIN: CPT

## 2022-03-10 PROCEDURE — 85025 COMPLETE CBC W/AUTO DIFF WBC: CPT

## 2022-03-10 PROCEDURE — 80307 DRUG TEST PRSMV CHEM ANLYZR: CPT

## 2022-03-10 PROCEDURE — 80048 BASIC METABOLIC PNL TOTAL CA: CPT

## 2022-03-10 PROCEDURE — 82330 ASSAY OF CALCIUM: CPT

## 2022-03-10 PROCEDURE — 82550 ASSAY OF CK (CPK): CPT

## 2022-03-10 PROCEDURE — 71045 X-RAY EXAM CHEST 1 VIEW: CPT

## 2022-03-10 PROCEDURE — 84484 ASSAY OF TROPONIN QUANT: CPT

## 2022-03-10 PROCEDURE — C8929: CPT

## 2022-03-10 PROCEDURE — 83036 HEMOGLOBIN GLYCOSYLATED A1C: CPT

## 2022-03-10 PROCEDURE — 99232 SBSQ HOSP IP/OBS MODERATE 35: CPT

## 2022-03-10 PROCEDURE — 71275 CT ANGIOGRAPHY CHEST: CPT

## 2022-03-10 PROCEDURE — 85018 HEMOGLOBIN: CPT

## 2022-03-10 PROCEDURE — 83605 ASSAY OF LACTIC ACID: CPT

## 2022-03-10 PROCEDURE — 85730 THROMBOPLASTIN TIME PARTIAL: CPT

## 2022-03-10 PROCEDURE — 84443 ASSAY THYROID STIM HORMONE: CPT

## 2022-03-10 PROCEDURE — 85027 COMPLETE CBC AUTOMATED: CPT

## 2022-03-10 PROCEDURE — 99222 1ST HOSP IP/OBS MODERATE 55: CPT

## 2022-03-10 PROCEDURE — 81001 URINALYSIS AUTO W/SCOPE: CPT

## 2022-03-10 PROCEDURE — U0003: CPT

## 2022-03-10 PROCEDURE — 82947 ASSAY GLUCOSE BLOOD QUANT: CPT

## 2022-03-10 PROCEDURE — 73030 X-RAY EXAM OF SHOULDER: CPT

## 2022-03-10 PROCEDURE — 85610 PROTHROMBIN TIME: CPT

## 2022-03-10 PROCEDURE — 84295 ASSAY OF SERUM SODIUM: CPT

## 2022-03-10 PROCEDURE — 80061 LIPID PANEL: CPT

## 2022-03-10 PROCEDURE — 84132 ASSAY OF SERUM POTASSIUM: CPT

## 2022-03-10 PROCEDURE — 80053 COMPREHEN METABOLIC PANEL: CPT

## 2022-03-10 PROCEDURE — 82607 VITAMIN B-12: CPT

## 2022-03-10 PROCEDURE — 93005 ELECTROCARDIOGRAM TRACING: CPT

## 2022-03-10 PROCEDURE — 86803 HEPATITIS C AB TEST: CPT

## 2022-03-10 PROCEDURE — 82746 ASSAY OF FOLIC ACID SERUM: CPT

## 2022-03-10 PROCEDURE — 82435 ASSAY OF BLOOD CHLORIDE: CPT

## 2022-03-10 PROCEDURE — 72125 CT NECK SPINE W/O DYE: CPT | Mod: MA

## 2022-03-10 PROCEDURE — 36415 COLL VENOUS BLD VENIPUNCTURE: CPT

## 2022-03-10 RX ORDER — QUETIAPINE FUMARATE 200 MG/1
0 TABLET, FILM COATED ORAL
Qty: 0 | Refills: 0 | DISCHARGE

## 2022-03-10 RX ORDER — LAMOTRIGINE 25 MG/1
150 TABLET, ORALLY DISINTEGRATING ORAL AT BEDTIME
Refills: 0 | Status: DISCONTINUED | OUTPATIENT
Start: 2022-03-10 | End: 2022-03-17

## 2022-03-10 RX ORDER — ESCITALOPRAM OXALATE 10 MG/1
0 TABLET, FILM COATED ORAL
Qty: 0 | Refills: 0 | DISCHARGE

## 2022-03-10 RX ORDER — QUETIAPINE FUMARATE 200 MG/1
200 TABLET, FILM COATED ORAL AT BEDTIME
Refills: 0 | Status: DISCONTINUED | OUTPATIENT
Start: 2022-03-10 | End: 2022-04-21

## 2022-03-10 RX ORDER — LAMOTRIGINE 25 MG/1
125 TABLET, ORALLY DISINTEGRATING ORAL DAILY
Refills: 0 | Status: DISCONTINUED | OUTPATIENT
Start: 2022-03-10 | End: 2022-03-16

## 2022-03-10 RX ORDER — ASPIRIN/CALCIUM CARB/MAGNESIUM 324 MG
81 TABLET ORAL DAILY
Refills: 0 | Status: DISCONTINUED | OUTPATIENT
Start: 2022-03-10 | End: 2022-04-21

## 2022-03-10 RX ORDER — INFLUENZA VIRUS VACCINE 15; 15; 15; 15 UG/.5ML; UG/.5ML; UG/.5ML; UG/.5ML
0.7 SUSPENSION INTRAMUSCULAR ONCE
Refills: 0 | Status: COMPLETED | OUTPATIENT
Start: 2022-03-10 | End: 2022-03-10

## 2022-03-10 RX ORDER — LEVOTHYROXINE SODIUM 125 MCG
25 TABLET ORAL DAILY
Refills: 0 | Status: DISCONTINUED | OUTPATIENT
Start: 2022-03-10 | End: 2022-04-21

## 2022-03-10 RX ORDER — LAMOTRIGINE 25 MG/1
25 TABLET, ORALLY DISINTEGRATING ORAL DAILY
Refills: 0 | Status: DISCONTINUED | OUTPATIENT
Start: 2022-03-10 | End: 2022-03-10

## 2022-03-10 RX ADMIN — Medication 81 MILLIGRAM(S): at 11:18

## 2022-03-10 RX ADMIN — Medication 0.5 MILLIGRAM(S): at 13:28

## 2022-03-10 RX ADMIN — Medication 0.5 MILLIGRAM(S): at 04:59

## 2022-03-10 RX ADMIN — LAMOTRIGINE 150 MILLIGRAM(S): 25 TABLET, ORALLY DISINTEGRATING ORAL at 21:25

## 2022-03-10 RX ADMIN — LAMOTRIGINE 125 MILLIGRAM(S): 25 TABLET, ORALLY DISINTEGRATING ORAL at 11:18

## 2022-03-10 RX ADMIN — Medication 25 MICROGRAM(S): at 05:00

## 2022-03-10 RX ADMIN — Medication 0.5 MILLIGRAM(S): at 10:20

## 2022-03-10 RX ADMIN — QUETIAPINE FUMARATE 200 MILLIGRAM(S): 200 TABLET, FILM COATED ORAL at 21:25

## 2022-03-10 RX ADMIN — Medication 0.5 MILLIGRAM(S): at 21:25

## 2022-03-10 NOTE — BH CONSULTATION LIAISON PROGRESS NOTE - NSBHINDICATION_PSY_ALL_CORE
Suicide attempt 
Suicide attempt 
35 y/o female with multiple medical complaints. Plan: labs, urine, meds, re eval
Suicide attempt 

## 2022-03-10 NOTE — DISCHARGE NOTE NURSING/CASE MANAGEMENT/SOCIAL WORK - PATIENT PORTAL LINK FT
You can access the FollowMyHealth Patient Portal offered by [facility] by registering at the following website: [email or web address]. By joining [facility]’s FollowMyHealth portal, you will also be able to view your health information using other applications (apps) compatible with our system.

## 2022-03-10 NOTE — BH CONSULTATION LIAISON PROGRESS NOTE - NSBHFUPINTERVALCCFT_PSY_A_CORE
"I want to die."
"I'm still so anxious"
"I'm going today"	
"I'm really anxious."
Follow up for Bipolar Disorder

## 2022-03-10 NOTE — BH INPATIENT PSYCHIATRY ASSESSMENT NOTE - NSBHMETABOLIC_PSY_ALL_CORE_FT
BMI: BMI (kg/m2): 24.9 (03-10-22 @ 15:37)  HbA1c: A1C with Estimated Average Glucose Result: 4.8 % (03-05-22 @ 10:01)    Glucose: POCT Blood Glucose.: 113 mg/dL (03-02-22 @ 19:36)    BP: --  Lipid Panel: Date/Time: 03-05-22 @ 10:39  Cholesterol, Serum: 148  Direct LDL: --  HDL Cholesterol, Serum: 45  Total Cholesterol/HDL Ration Measurement: --  Triglycerides, Serum: 98

## 2022-03-10 NOTE — BH CONSULTATION LIAISON PROGRESS NOTE - NSBHCHARTREVIEWLAB_PSY_A_CORE FT
11.5   4.66  )-----------( 217      ( 04 Mar 2022 07:19 )             33.4     03-04    139  |  106  |  14  ----------------------------<  109<H>  3.1<L>   |  23  |  0.53    Ca    8.6      04 Mar 2022 07:16    TPro  6.4  /  Alb  3.9  /  TBili  1.1  /  DBili  x   /  AST  34  /  ALT  17  /  AlkPhos  72  03-02    Urinalysis Basic - ( 03 Mar 2022 19:18 )    Color: Yellow / Appearance: Clear / S.042 / pH: x  Gluc: x / Ketone: Large  / Bili: Small / Urobili: 3 mg/dL   Blood: x / Protein: 30 mg/dL / Nitrite: Negative   Leuk Esterase: Negative / RBC: 2 /hpf / WBC 2 /HPF   Sq Epi: x / Non Sq Epi: x / Bacteria: Negative    
                      11.3   3.73  )-----------( 243      ( 09 Mar 2022 07:13 )             33.5     03-09    142  |  108  |  8   ----------------------------<  105<H>  3.6   |  27  |  0.68    Ca    9.0      09 Mar 2022 07:09      
                      11.5   4.66  )-----------( 217      ( 04 Mar 2022 07:19 )             33.4     03-04    139  |  106  |  14  ----------------------------<  109<H>  3.1<L>   |  23  |  0.53    Ca    8.6      04 Mar 2022 07:16    TPro  6.4  /  Alb  3.9  /  TBili  1.1  /  DBili  x   /  AST  34  /  ALT  17  /  AlkPhos  72  03-02    Urinalysis Basic - ( 03 Mar 2022 19:18 )    Color: Yellow / Appearance: Clear / S.042 / pH: x  Gluc: x / Ketone: Large  / Bili: Small / Urobili: 3 mg/dL   Blood: x / Protein: 30 mg/dL / Nitrite: Negative   Leuk Esterase: Negative / RBC: 2 /hpf / WBC 2 /HPF   Sq Epi: x / Non Sq Epi: x / Bacteria: Negative    
                      11.7   3.89  )-----------( 226      ( 08 Mar 2022 07:31 )             34.6     03-08    142  |  104  |  8   ----------------------------<  116<H>  3.9   |  28  |  0.64    Ca    9.4      08 Mar 2022 10:30

## 2022-03-10 NOTE — BH CONSULTATION LIAISON PROGRESS NOTE - NSBHCHARTREVIEWVS_PSY_A_CORE FT
Vital Signs Last 24 Hrs  T(C): 36.9 (07 Mar 2022 12:36), Max: 37.1 (07 Mar 2022 09:25)  T(F): 98.5 (07 Mar 2022 12:36), Max: 98.8 (07 Mar 2022 09:25)  HR: 78 (07 Mar 2022 12:36) (71 - 78)  BP: 136/82 (07 Mar 2022 12:36) (123/75 - 136/82)  BP(mean): --  RR: 18 (07 Mar 2022 12:36) (18 - 18)  SpO2: 97% (07 Mar 2022 12:36) (97% - 98%)
Vital Signs Last 24 Hrs  T(C): 36.2 (06 Mar 2022 09:28), Max: 37.2 (05 Mar 2022 13:59)  T(F): 97.1 (06 Mar 2022 09:28), Max: 99 (05 Mar 2022 13:59)  HR: 83 (06 Mar 2022 09:28) (70 - 83)  BP: 121/85 (06 Mar 2022 09:28) (112/72 - 132/87)  BP(mean): --  RR: 18 (06 Mar 2022 09:28) (18 - 18)  SpO2: 98% (06 Mar 2022 09:28) (97% - 100%)
Vital Signs Last 24 Hrs  T(C): 36.8 (08 Mar 2022 13:00), Max: 37.3 (08 Mar 2022 09:41)  T(F): 98.2 (08 Mar 2022 13:00), Max: 99.2 (08 Mar 2022 09:41)  HR: 78 (08 Mar 2022 13:00) (68 - 83)  BP: 124/75 (08 Mar 2022 13:00) (116/79 - 130/89)  BP(mean): --  RR: 18 (08 Mar 2022 13:00) (18 - 18)  SpO2: 100% (08 Mar 2022 13:00) (98% - 100%)
Vital Signs Last 24 Hrs  T(C): 36.9 (04 Mar 2022 13:04), Max: 37.1 (03 Mar 2022 16:39)  T(F): 98.4 (04 Mar 2022 13:04), Max: 98.7 (03 Mar 2022 16:39)  HR: 83 (04 Mar 2022 13:04) (76 - 91)  BP: 133/83 (04 Mar 2022 13:04) (100/64 - 133/83)  BP(mean): --  RR: 18 (04 Mar 2022 13:04) (18 - 18)  SpO2: 98% (04 Mar 2022 13:04) (96% - 99%)
Vital Signs Last 24 Hrs  T(C): 36.8 (10 Mar 2022 14:44), Max: 37.2 (10 Mar 2022 00:23)  T(F): 98.2 (10 Mar 2022 14:44), Max: 98.9 (10 Mar 2022 00:23)  HR: 85 (10 Mar 2022 14:44) (74 - 86)  BP: 151/78 (10 Mar 2022 14:44) (97/57 - 151/78)  BP(mean): --  RR: 18 (10 Mar 2022 14:44) (18 - 18)  SpO2: 97% (10 Mar 2022 14:44) (95% - 100%)

## 2022-03-10 NOTE — BH INPATIENT PSYCHIATRY ASSESSMENT NOTE - NSBHCHARTREVIEWLAB_PSY_A_CORE FT
13.6   5.52  )-----------( 240      ( 03 Mar 2022 11:23 )             38.5     03-03    135  |  102  |  17  ----------------------------<  102<H>  3.6   |  19<L>  |  0.56    Ca    9.2      03 Mar 2022 11:23    TPro  6.4  /  Alb  3.9  /  TBili  1.1  /  DBili  x   /  AST  34  /  ALT  17  /  AlkPhos  72  03-02    Urinalysis Basic - ( 02 Mar 2022 22:02 )    Color: Yellow / Appearance: Clear / S.032 / pH: x  Gluc: x / Ketone: Large  / Bili: Small / Urobili: 2 mg/dL   Blood: x / Protein: 30 mg/dL / Nitrite: Negative   Leuk Esterase: Negative / RBC: 4 /hpf / WBC 3 /HPF   Sq Epi: x / Non Sq Epi: 1 /hpf / Bacteria: Negative

## 2022-03-10 NOTE — BH CONSULTATION LIAISON PROGRESS NOTE - NSICDXBHPRIMARYDX_PSY_ALL_CORE
Bipolar 1 disorder, depressed, severe   F31.4  

## 2022-03-10 NOTE — BH INPATIENT PSYCHIATRY ASSESSMENT NOTE - NSBHCHARTREVIEWVS_PSY_A_CORE FT
Vital Signs Last 24 Hrs  T(C): 37 (03-10-22 @ 15:37), Max: 37.2 (03-10-22 @ 00:23)  T(F): 98.6 (03-10-22 @ 15:37), Max: 98.9 (03-10-22 @ 00:23)  HR: 85 (03-10-22 @ 14:44) (74 - 86)  BP: 151/78 (03-10-22 @ 14:44) (97/57 - 151/78)  BP(mean): --  RR: 18 (03-10-22 @ 15:37) (18 - 18)  SpO2: 98% (03-10-22 @ 15:37) (95% - 100%)    Orthostatic VS  03-10-22 @ 15:37  Lying BP: --/-- HR: --  Sitting BP: 136/85 HR: 82  Standing BP: 139/93 HR: 83  Site: upper right arm  Mode: electronic

## 2022-03-10 NOTE — BH CONSULTATION LIAISON PROGRESS NOTE - NSBHFUPINTERVALHXFT_PSY_A_CORE
Pt seen for f/u of SA, reports she feels very anxious, worried about the next steps, feels she is not receiving updates, states she continues to feel very depressed, thinks she is cursed by the devil, denies SI/HI presently.  Spoke with pt's brother Sonny Salazar at pt's request, states he is concerned about polypharmacy, also wanted to know more about psychiatric admission regarding LOS, tx options, etc.
Pt seen for f/u, states she continues to feel very anxious which she thinks is the predominant force that led to her SA.  States she has the SI less intensely, but feels very anxious.  Worries about a prolonged admission, states her mother was the same way in which she would also worry about things that had not yet come to pass.  States she continues to have thoughts of the devil, being cursed.
Pt seen for f/u, states she still feels extremely depressed, wants to die, also still not feeling back to baseline after the OD.  Denies AVH, but continues to believe the devil has cursed her, that we are in hell and she is trapped here.
Pt seen for f/u, she continues to endorse depressed mood, anxiety. Discussed plan for when she leaves the hospital and addressed concerns the patient has. She continues to feel hopeless about her prognosis. 
Pt reports 8/10 depression today, still with SI but less intense.  Also continues to believe the devil has cursed her, worries if she is just unlucky.  States she has prominent anxiety, worries about the next steps and what is going to happen to her.  Amenable for med management.

## 2022-03-10 NOTE — CHART NOTE - NSCHARTNOTEFT_GEN_A_CORE
MEDICINE PA EPISODIC NOTE    CC: CTA results     Notified by Dr. Rodriguez in Radiology that patient's CTA had an incidental finding of left lower lobe PE. Dr. Kapadia made aware, recommended to start patient on Eliquis and order doppler of lower extremities. Patient is currently asymptomatic and hemodynamically stable. Will closely monitor overnight. Will endorse care to AM team. Attending to follow.     Bridget Blanchard PA-C  Dept. of Medicine   [id number]
The patient informed the nurse that she took 90 tablets of Ativan.  Patient placed on 1:1 and psych consult called. Patient to be evaluated.
This report was requested by: Dayna Hunt | Reference #: [id number]    Others' Prescriptions  Patient Name: Pau Eddy Date: 1950  Address: [address] [place] [id number]: Female  Rx Written	Rx Dispensed	Drug	Quantity	Days Supply	Prescriber Name  01/19/2022	01/19/2022	lorazepam 0.5 mg tablet	90	30	Dorita Car MD  Prescriber Catherine # [id number]  Payment Method Medicare  Disp[facility] #[id number]  12/22/2021	12/26/2021	lorazepam 0.5 mg tablet	90	30	DilanRohan MD  Prescriber Catherine # [id number]  Payment Method Medicare  Dispens[facility] #[id number]  11/18/2021	11/18/2021	lorazepam 0.5 mg tablet	90	30	Dilan, Rohan HOANG  Prescriber Catherine # [id number]  Payment Method Medicare  Dispens[facility] #[id number]  10/18/2021	10/25/2021	lorazepam 0.5 mg tablet	90	30	DilanRohan MD  Prescriber Catherine # [id number]  Payment Method Medicare  Dispenser [facility] #[id number]  09/23/2021	09/23/2021	lorazepam 0.5 mg tablet	90	30	DilanRohan MD  Prescriber Catherine # [id number]  Payment Method Medicare  Dispenser [facility] #[id number]  06/03/2021	06/09/2021	lorazepam 0.5 mg tablet	90	30	DilanRohan MD  Prescriber Catherine # [id number]  Payment Method Medicare  Dispenser [facility] #[id number]  05/06/2021	05/10/2021	lorazepam 0.5 mg tablet	90	30	DilanRohan MD  Prescriber Catherine # [id number]  Payment Method Medicare  Dispenser [facility] #[id number]    Patient Name: Pau Eddy Date: 1950  Address: [address] [place] [id number]: Female  Rx Written	Rx Dispensed	Drug	Quantity	Days Supply	Prescriber Name  02/01/2022	02/06/2022	lorazepam 0.5 mg tablet	90	30	Dorita Car MD  Prescriber [facility] # [id number]  Payment Method Medicare  Dispenser [facility]  06/23/2021	06/25/2021	lorazepam 0.5 mg tablet	90	30	DilanRohan brothers MD  Prescriber Catherine # [id number]  Payment Method Medicare  Dispenser [facility]  04/08/2021	04/14/2021	lorazepam 0.5 mg tablet	90	30	Dilan, Rohna VAUGHN  Prescriber [facility] # [id number]  Payment Method Medicare  Dispenser [facility]  03/11/2021	03/22/2021	lorazepam 0.5 mg tablet	90	30	DilanRohan brothers  Prescriber Catherine # [id number]  Payment Method Medicare  Dispenser [employer] [facility]
Noted med changes in psyc note for today, however patient discharge prior to written note. Discussed with Dr. Greenberg who gave recommendations to receiving facility, they will follow up and take over management.

## 2022-03-10 NOTE — BH CONSULTATION LIAISON PROGRESS NOTE - ADDITIONAL PSYCHIATRIC MEDICATIONS
Seroquel 200mg qHS  Ativan 0.5 PO TID   Lamictal 100mg Po BID   Lexapro   

## 2022-03-10 NOTE — PROGRESS NOTE ADULT - SUBJECTIVE AND OBJECTIVE BOX
CARDIOLOGY     PROGRESS  NOTE   ________________________________________________    CHIEF COMPLAINT:Patient is a 71y old  Female who presents with a chief complaint of altered mental status (03 Mar 2022 19:33)  no complain.  	  REVIEW OF SYSTEMS:  CONSTITUTIONAL: No fever, weight loss, or fatigue  EYES: No eye pain, visual disturbances, or discharge  ENT:  No difficulty hearing, tinnitus, vertigo; No sinus or throat pain  NECK: No pain or stiffness  RESPIRATORY: No cough, wheezing, chills or hemoptysis; No Shortness of Breath  CARDIOVASCULAR: No chest pain, palpitations, passing out, dizziness, or leg swelling  GASTROINTESTINAL: No abdominal or epigastric pain. No nausea, vomiting, or hematemesis; No diarrhea or constipation. No melena or hematochezia.  GENITOURINARY: No dysuria, frequency, hematuria, or incontinence  NEUROLOGICAL: No headaches, memory loss, loss of strength, numbness, or tremors  SKIN: No itching, burning, rashes, or lesions   LYMPH Nodes: No enlarged glands  ENDOCRINE: No heat or cold intolerance; No hair loss  MUSCULOSKELETAL: No joint pain or swelling; No muscle, back, or extremity pain  PSYCHIATRIC: No depression, anxiety, mood swings, or difficulty sleeping  HEME/LYMPH: No easy bruising, or bleeding gums  ALLERGY AND IMMUNOLOGIC: No hives or eczema	    [ ] All others negative	  [ x] Unable to obtain    PHYSICAL EXAM:  T(C): 36.7 (03-04-22 @ 05:26), Max: 37.1 (03-03-22 @ 09:51)  HR: 80 (03-04-22 @ 05:26) (76 - 92)  BP: 119/73 (03-04-22 @ 05:26) (97/57 - 120/78)  RR: 18 (03-04-22 @ 05:26) (18 - 18)  SpO2: 99% (03-04-22 @ 05:26) (95% - 99%)  Wt(kg): --  I&O's Summary    03 Mar 2022 07:01  -  04 Mar 2022 07:00  --------------------------------------------------------  IN: 3340 mL / OUT: 750 mL / NET: 2590 mL    04 Mar 2022 07:01  -  04 Mar 2022 08:08  --------------------------------------------------------  IN: 0 mL / OUT: 100 mL / NET: -100 mL        Appearance: Normal	  HEENT:   Normal oral mucosa, PERRL, EOMI	  Lymphatic: No lymphadenopathy  Cardiovascular: Normal S1 S2, No JVD, +murmurs, No edema  Respiratory: Lungs clear to auscultation	  Psychiatry: A & O x 3, Mood & affect appropriate  Gastrointestinal:  Soft, Non-tender, + BS	  Skin: No rashes, No ecchymoses, No cyanosis	  Neurologic: Non-focal  Extremities: Normal range of motion, No clubbing, cyanosis or edema  Vascular: Peripheral pulses palpable 2+ bilaterally    MEDICATIONS  (STANDING):  heparin   Injectable 5000 Unit(s) SubCutaneous every 8 hours  lactated ringers. 1000 milliLiter(s) (75 mL/Hr) IV Continuous <Continuous>  lamoTRIgine 125 milliGRAM(s) Oral daily  lamoTRIgine 150 milliGRAM(s) Oral at bedtime      TELEMETRY: 	    ECG:  	  RADIOLOGY:  OTHER: 	  	  LABS:	 	    CARDIAC MARKERS:  CARDIAC MARKERS ( 02 Mar 2022 19:42 )  x     / x     / 334 U/L / x     / x                                    11.5   4.66  )-----------( 217      ( 04 Mar 2022 07:19 )             33.4     03-04    139  |  106  |  14  ----------------------------<  109<H>  3.1<L>   |  23  |  0.53    Ca    8.6      04 Mar 2022 07:16    TPro  6.4  /  Alb  3.9  /  TBili  1.1  /  DBili  x   /  AST  34  /  ALT  17  /  AlkPhos  72  03-02    proBNP:   Lipid Profile:   HgA1c:   TSH: Thyroid Stimulating Hormone, Serum: 4.05 uIU/mL (03-03 @ 00:56)    PT/INR - ( 02 Mar 2022 19:42 )   PT: 15.7 sec;   INR: 1.36 ratio         PTT - ( 02 Mar 2022 19:42 )  PTT:25.8 sec  < from: 12 Lead ECG (03.02.22 @ 22:13) >  Diagnosis Line NORMAL SINUS RHYTHM  SEPTAL INFARCT , AGE UNDETERMINED  T WAVE ABNORMALITY, CONSIDER INFERIOR ISCHEMIA  T WAVE ABNORMALITY, CONSIDER ANTEROLATERAL ISCHEMIA  ABNORMAL ECG  NO PREVIOUS ECGS AVAILABLE        Assessment and plan  ---------------------------  72 y/o female with PMHx of depression and anxiety was brought in by ambulance for being found on ground by landlord. Pt states she remembers feeling dizzy and falling down a set of stairs. She does not recall being found by her landlord. She also states her psych meds were recently being adjusted. Her Lexapro had been discontinued, then re-instated about 3-4 weeks ago. Since then she reported feeling more dizzy than usual. She also takes Seroquel and ativan. She endorsed depressed mood but denies any suicidal ideation or hallucination. Further history was limited as patient was lethargic.  Vitals: afebrile, HR 87, /70, SpO2 96% on room air.  pt with hx of depression/ anxiety who was found on the floor.  pt claims to take extra xanax  ecg with sig abnormality  echo to check wall motion  check orthostatic bp  tsh  lipid   asa daily  psych eval  neuro eval noted  dvt prophylaxis    	        
           CARDIOLOGY     PROGRESS  NOTE   ________________________________________________    CHIEF COMPLAINT:Patient is a 71y old  Female who presents with a chief complaint of altered mental status (09 Mar 2022 16:56)  no complain.  	  REVIEW OF SYSTEMS:  CONSTITUTIONAL: No fever, weight loss, or fatigue  EYES: No eye pain, visual disturbances, or discharge  ENT:  No difficulty hearing, tinnitus, vertigo; No sinus or throat pain  NECK: No pain or stiffness  RESPIRATORY: No cough, wheezing, chills or hemoptysis; No Shortness of Breath  CARDIOVASCULAR: No chest pain, palpitations, passing out, dizziness, or leg swelling  GASTROINTESTINAL: No abdominal or epigastric pain. No nausea, vomiting, or hematemesis; No diarrhea or constipation. No melena or hematochezia.  GENITOURINARY: No dysuria, frequency, hematuria, or incontinence  NEUROLOGICAL: No headaches, memory loss, loss of strength, numbness, or tremors  SKIN: No itching, burning, rashes, or lesions   LYMPH Nodes: No enlarged glands  ENDOCRINE: No heat or cold intolerance; No hair loss  MUSCULOSKELETAL: No joint pain or swelling; No muscle, back, or extremity pain  PSYCHIATRIC: No depression, anxiety, mood swings, or difficulty sleeping  HEME/LYMPH: No easy bruising, or bleeding gums  ALLERGY AND IMMUNOLOGIC: No hives or eczema	    [ ] All others negative	  [ ] Unable to obtain    PHYSICAL EXAM:  T(C): 36.7 (03-10-22 @ 04:41), Max: 37.2 (03-10-22 @ 00:23)  HR: 83 (03-10-22 @ 04:41) (74 - 90)  BP: 125/73 (03-10-22 @ 04:41) (97/57 - 136/91)  RR: 18 (03-10-22 @ 04:41) (18 - 18)  SpO2: 98% (03-10-22 @ 04:41) (95% - 100%)  Wt(kg): --  I&O's Summary    09 Mar 2022 07:01  -  10 Mar 2022 07:00  --------------------------------------------------------  IN: 1180 mL / OUT: 0 mL / NET: 1180 mL        Appearance: Normal	  HEENT:   Normal oral mucosa, PERRL, EOMI	  Lymphatic: No lymphadenopathy  Cardiovascular: Normal S1 S2, No JVD, + murmurs, No edema  Respiratory: Lungs clear to auscultation	  Psychiatry: A & O x 3, Mood & affect appropriate  Gastrointestinal:  Soft, Non-tender, + BS	  Skin: No rashes, No ecchymoses, No cyanosis	  Neurologic: Non-focal  Extremities: Normal range of motion, No clubbing, cyanosis or edema  Vascular: Peripheral pulses palpable 2+ bilaterally    MEDICATIONS  (STANDING):  aspirin enteric coated 81 milliGRAM(s) Oral daily  lamoTRIgine 125 milliGRAM(s) Oral daily  lamoTRIgine 150 milliGRAM(s) Oral at bedtime  levothyroxine 25 MICROGram(s) Oral daily  LORazepam     Tablet 0.5 milliGRAM(s) Oral three times a day  QUEtiapine 200 milliGRAM(s) Oral at bedtime      TELEMETRY: 	    ECG:  	  RADIOLOGY:  OTHER: 	  	  LABS:	 	    CARDIAC MARKERS:                                11.3   3.73  )-----------( 243      ( 09 Mar 2022 07:13 )             33.5     03-09    142  |  108  |  8   ----------------------------<  105<H>  3.6   |  27  |  0.68    Ca    9.0      09 Mar 2022 07:09      proBNP:   Lipid Profile: Cholesterol 148  LDL --  HDL 45  TG 98    HgA1c:   TSH: Thyroid Stimulating Hormone, Serum: 7.00 uIU/mL (03-05 @ 10:48)  Thyroid Stimulating Hormone, Serum: 4.05 uIU/mL (03-03 @ 00:56)    < from: VA Duplex Lower Ext Vein Scan, Bilat (03.09.22 @ 15:01) >  RIGHT:  Normal compressibility of the RIGHT common femoral, femoral and popliteal   veins.  Doppler examination shows normal spontaneous and phasic flow.  No RIGHT calf vein thrombosis is detected.    LEFT:  Normal compressibility of the LEFT common femoral, femoral and popliteal   veins.  Doppler examination shows normal spontaneous and phasic flow.  No LEFT calf vein thrombosis is detected.    IMPRESSION:  No evidence of deep venous thrombosis in either lower extremity.              Assessment and plan  ---------------------------  70 y/o female with PMHx of depression and anxiety was brought in by ambulance for being found on ground by landlord. Pt states she remembers feeling dizzy and falling down a set of stairs. She does not recall being found by her landlord. She also states her psych meds were recently being adjusted. Her Lexapro had been discontinued, then re-instated about 3-4 weeks ago. Since then she reported feeling more dizzy than usual. She also takes Seroquel and ativan. She endorsed depressed mood but denies any suicidal ideation or hallucination. Further history was limited as patient was lethargic.  Vitals: afebrile, HR 87, /70, SpO2 96% on room air.  pt with hx of depression/ anxiety who was found on the floor.  pt claims to take extra xanax  ecg with sig abnormality  echo noted with normal wall motion  tsh noted  lipid   asa daily  psych eval  neuro eval noted  dvt prophylaxis  POS orthostatic, gentle hydration, marcela stocking ?sec to Seroquel, repeat  cardiac ct noted, no sig cad, + PE ???  PE, continue AC  check le venous doppler, negative    	        
    afberile  REVIEW OF SYSTEMS:  GEN: no fever,    no chills  RESP: no SOB,   no cough  CVS: no chest pain,   no palpitations  GI: no abdominal pain,   no nausea,   no vomiting,   no constipation,   no diarrhea  : no dysuria,   no frequency  NEURO: no headache,   no dizziness  PSYCH: no depression,   not anxious  Derm : no rash    MEDICATIONS  (STANDING):  aspirin enteric coated 81 milliGRAM(s) Oral daily  heparin   Injectable 5000 Unit(s) SubCutaneous every 8 hours  lamoTRIgine 125 milliGRAM(s) Oral daily  lamoTRIgine 150 milliGRAM(s) Oral at bedtime  LORazepam     Tablet 0.5 milliGRAM(s) Oral three times a day  QUEtiapine 200 milliGRAM(s) Oral at bedtime  sodium chloride 0.9%. 500 milliLiter(s) (50 mL/Hr) IV Continuous <Continuous>    MEDICATIONS  (PRN):      Vital Signs Last 24 Hrs  T(C): 37.1 (05 Mar 2022 04:53), Max: 37.1 (04 Mar 2022 09:09)  T(F): 98.7 (05 Mar 2022 04:53), Max: 98.8 (04 Mar 2022 17:34)  HR: 79 (05 Mar 2022 04:53) (74 - 85)  BP: 119/81 (05 Mar 2022 04:53) (102/68 - 133/83)  BP(mean): --  RR: 18 (05 Mar 2022 04:53) (18 - 18)  SpO2: 100% (05 Mar 2022 04:53) (96% - 100%)  CAPILLARY BLOOD GLUCOSE        I&O's Summary    04 Mar 2022 07:01  -  05 Mar 2022 07:00  --------------------------------------------------------  IN: 870 mL / OUT: 200 mL / NET: 670 mL        PHYSICAL EXAM:  HEAD:  Atraumatic, Normocephalic  NECK: Supple, No   JVD  CHEST/LUNG:   no     rales,     no,    rhonchi  HEART: Regular rate and rhythm;         murmur  ABDOMEN: Soft, Nontender, ;   EXTREMITIES:   no     edema  NEUROLOGY:  alert    LABS:                        11.4   3.49  )-----------( 221      ( 05 Mar 2022 05:32 )             32.6         145  |  113<H>  |  6<L>  ----------------------------<  102<H>  3.9   |  22  |  0.53    Ca    8.9      05 Mar 2022 05:32            Urinalysis Basic - ( 03 Mar 2022 19:18 )    Color: Yellow / Appearance: Clear / S.042 / pH: x  Gluc: x / Ketone: Large  / Bili: Small / Urobili: 3 mg/dL   Blood: x / Protein: 30 mg/dL / Nitrite: Negative   Leuk Esterase: Negative / RBC: 2 /hpf / WBC 2 /HPF   Sq Epi: x / Non Sq Epi: x / Bacteria: Negative              Thyroid Stimulating Hormone, Serum: 4.05 uIU/mL ( @ 00:56)          Consultant(s) Notes Reviewed:      Care Discussed with Consultants/Other Providers:    
    afebriel  REVIEW OF SYSTEMS:  GEN: no fever,    no chills  RESP: no SOB,   no cough  CVS: no chest pain,   no palpitations  GI: no abdominal pain,   no nausea,   no vomiting,   no constipation,   no diarrhea  : no dysuria,   no frequency  NEURO: no headache,   no dizziness  PSYCH: no depression,   not anxious  Derm : no rash    MEDICATIONS  (STANDING):  heparin   Injectable 5000 Unit(s) SubCutaneous every 8 hours  lactated ringers. 1000 milliLiter(s) (75 mL/Hr) IV Continuous <Continuous>  sodium chloride 0.9% Bolus 1000 milliLiter(s) IV Bolus once    MEDICATIONS  (PRN):      Vital Signs Last 24 Hrs  T(C): 37.1 (03 Mar 2022 09:51), Max: 37.6 (02 Mar 2022 19:32)  T(F): 98.8 (03 Mar 2022 09:51), Max: 99.7 (02 Mar 2022 19:32)  HR: 92 (03 Mar 2022 09:51) (75 - 107)  BP: 97/57 (03 Mar 2022 09:51) (97/57 - 162/73)  BP(mean): 94 (02 Mar 2022 23:22) (85 - 97)  RR: 18 (03 Mar 2022 09:51) (18 - 20)  SpO2: 95% (03 Mar 2022 09:51) (95% - 100%)  CAPILLARY BLOOD GLUCOSE      POCT Blood Glucose.: 113 mg/dL (02 Mar 2022 19:36)    I&O's Summary    03 Mar 2022 07:01  -  03 Mar 2022 11:36  --------------------------------------------------------  IN: 80 mL / OUT: 0 mL / NET: 80 mL        PHYSICAL EXAM:  HEAD:  Atraumatic, Normocephalic  NECK: Supple, No   JVD  CHEST/LUNG:   no     rales,     no,    rhonchi  HEART: Regular rate and rhythm;         murmur  ABDOMEN: Soft, Nontender, ;   EXTREMITIES:      no  edema  NEUROLOGY:  alert    LABS:                        13.6   5.52  )-----------( 240      ( 03 Mar 2022 11:23 )             38.5     03-02    134<L>  |  100  |  20  ----------------------------<  117<H>  3.6   |  17<L>  |  0.84    Ca    9.4      02 Mar 2022 19:42    TPro  6.4  /  Alb  3.9  /  TBili  1.1  /  DBili  x   /  AST  34  /  ALT  17  /  AlkPhos  72      PT/INR - ( 02 Mar 2022 19:42 )   PT: 15.7 sec;   INR: 1.36 ratio         PTT - ( 02 Mar 2022 19:42 )  PTT:25.8 sec  CARDIAC MARKERS ( 02 Mar 2022 19:42 )  x     / x     / 334 U/L / x     / x          Urinalysis Basic - ( 02 Mar 2022 22:02 )    Color: Yellow / Appearance: Clear / S.032 / pH: x  Gluc: x / Ketone: Large  / Bili: Small / Urobili: 2 mg/dL   Blood: x / Protein: 30 mg/dL / Nitrite: Negative   Leuk Esterase: Negative / RBC: 4 /hpf / WBC 3 /HPF   Sq Epi: x / Non Sq Epi: 1 /hpf / Bacteria: Negative           @ 19:50  3.5  53      Thyroid Stimulating Hormone, Serum: 4.05 uIU/mL ( @ 00:56)          Consultant(s) Notes Reviewed:      Care Discussed with Consultants/Other Providers:    
           CARDIOLOGY     PROGRESS  NOTE   ________________________________________________    CHIEF COMPLAINT:Patient is a 71y old  Female who presents with a chief complaint of altered mental status (05 Mar 2022 08:51)  doing better.  	  REVIEW OF SYSTEMS:  CONSTITUTIONAL: No fever, weight loss, or fatigue  EYES: No eye pain, visual disturbances, or discharge  ENT:  No difficulty hearing, tinnitus, vertigo; No sinus or throat pain  NECK: No pain or stiffness  RESPIRATORY: No cough, wheezing, chills or hemoptysis; No Shortness of Breath  CARDIOVASCULAR: No chest pain, palpitations, passing out, dizziness, or leg swelling  GASTROINTESTINAL: No abdominal or epigastric pain. No nausea, vomiting, or hematemesis; No diarrhea or constipation. No melena or hematochezia.  GENITOURINARY: No dysuria, frequency, hematuria, or incontinence  NEUROLOGICAL: No headaches, memory loss, loss of strength, numbness, or tremors  SKIN: No itching, burning, rashes, or lesions   LYMPH Nodes: No enlarged glands  ENDOCRINE: No heat or cold intolerance; No hair loss  MUSCULOSKELETAL: No joint pain or swelling; No muscle, back, or extremity pain  PSYCHIATRIC: No depression, anxiety, mood swings, or difficulty sleeping  HEME/LYMPH: No easy bruising, or bleeding gums  ALLERGY AND IMMUNOLOGIC: No hives or eczema	    [ ] All others negative	  [ ] Unable to obtain    PHYSICAL EXAM:  T(C): 36.6 (03-06-22 @ 05:00), Max: 37.2 (03-05-22 @ 13:59)  HR: 74 (03-06-22 @ 05:00) (70 - 81)  BP: 132/87 (03-06-22 @ 05:00) (112/72 - 135/68)  RR: 18 (03-06-22 @ 05:00) (18 - 18)  SpO2: 100% (03-06-22 @ 05:00) (97% - 100%)  Wt(kg): --  I&O's Summary    05 Mar 2022 07:01  -  06 Mar 2022 07:00  --------------------------------------------------------  IN: 1640 mL / OUT: 750 mL / NET: 890 mL        Appearance: Normal	  HEENT:   Normal oral mucosa, PERRL, EOMI	  Lymphatic: No lymphadenopathy  Cardiovascular: Normal S1 S2, No JVD, + murmurs, No edema  Respiratory: Lungs clear to auscultation	  Psychiatry: A & O x 3, Mood & affect appropriate  Gastrointestinal:  Soft, Non-tender, + BS	  Skin: No rashes, No ecchymoses, No cyanosis	  Neurologic: Non-focal  Extremities: Normal range of motion, No clubbing, cyanosis or edema  Vascular: Peripheral pulses palpable 2+ bilaterally    MEDICATIONS  (STANDING):  aspirin enteric coated 81 milliGRAM(s) Oral daily  heparin   Injectable 5000 Unit(s) SubCutaneous every 8 hours  lamoTRIgine 125 milliGRAM(s) Oral daily  lamoTRIgine 150 milliGRAM(s) Oral at bedtime  LORazepam     Tablet 0.5 milliGRAM(s) Oral three times a day  QUEtiapine 200 milliGRAM(s) Oral at bedtime  sodium chloride 0.9%. 500 milliLiter(s) (50 mL/Hr) IV Continuous <Continuous>      TELEMETRY: 	    ECG:  	  RADIOLOGY:  OTHER: 	  	  LABS:	 	    CARDIAC MARKERS:                                11.4   3.49  )-----------( 221      ( 05 Mar 2022 05:32 )             32.6     03-05    145  |  113<H>  |  6<L>  ----------------------------<  102<H>  3.9   |  22  |  0.53    Ca    8.9      05 Mar 2022 05:32      proBNP:   Lipid Profile: Cholesterol 148  LDL --  HDL 45  TG 98    HgA1c:   TSH: Thyroid Stimulating Hormone, Serum: 7.00 uIU/mL (03-05 @ 10:48)  Thyroid Stimulating Hormone, Serum: 4.05 uIU/mL (03-03 @ 00:56)    < from: 12 Lead ECG (03.03.22 @ 10:50) >  NORMAL SINUS RHYTHM  T WAVE ABNORMALITY, CONSIDER ANTEROLATERAL ISCHEMIA  ABNORMAL ECG  NO PREVIOUS ECGS AVAILABLE      Assessment and plan  ---------------------------  72 y/o female with PMHx of depression and anxiety was brought in by ambulance for being found on ground by landlord. Pt states she remembers feeling dizzy and falling down a set of stairs. She does not recall being found by her landlord. She also states her psych meds were recently being adjusted. Her Lexapro had been discontinued, then re-instated about 3-4 weeks ago. Since then she reported feeling more dizzy than usual. She also takes Seroquel and ativan. She endorsed depressed mood but denies any suicidal ideation or hallucination. Further history was limited as patient was lethargic.  Vitals: afebrile, HR 87, /70, SpO2 96% on room air.  pt with hx of depression/ anxiety who was found on the floor.  pt claims to take extra xanax  ecg with sig abnormality  echo noted with normal wall motion  tsh noted  lipid   asa daily  psych eval  neuro eval noted  dvt prophylaxis  POS orthostatic, gentle hydration, marcela stocking ?sec to Seroquel, repeat  stress test  	    	        
    afberile  REVIEW OF SYSTEMS:  GEN: no fever,    no chills  RESP: no SOB,   no cough  CVS: no chest pain,   no palpitations  GI: no abdominal pain,   no nausea,   no vomiting,   no constipation,   no diarrhea  : no dysuria,   no frequency  NEURO: no headache,   no dizziness  PSYCH: no depression,   not anxious  Derm : no rash    MEDICATIONS  (STANDING):  apixaban 10 milliGRAM(s) Oral two times a day  aspirin enteric coated 81 milliGRAM(s) Oral daily  lamoTRIgine 125 milliGRAM(s) Oral daily  lamoTRIgine 150 milliGRAM(s) Oral at bedtime  levothyroxine 25 MICROGram(s) Oral daily  LORazepam     Tablet 0.5 milliGRAM(s) Oral three times a day  QUEtiapine 200 milliGRAM(s) Oral at bedtime    MEDICATIONS  (PRN):      Vital Signs Last 24 Hrs  T(C): 37.1 (09 Mar 2022 05:02), Max: 37.1 (09 Mar 2022 05:02)  T(F): 98.8 (09 Mar 2022 05:02), Max: 98.8 (09 Mar 2022 05:02)  HR: 72 (09 Mar 2022 05:02) (72 - 78)  BP: 110/75 (09 Mar 2022 05:02) (110/75 - 153/89)  BP(mean): --  RR: 18 (09 Mar 2022 05:02) (18 - 18)  SpO2: 97% (09 Mar 2022 05:02) (96% - 99%)  CAPILLARY BLOOD GLUCOSE        I&O's Summary    08 Mar 2022 07:01  -  09 Mar 2022 07:00  --------------------------------------------------------  IN: 470 mL / OUT: 800 mL / NET: -330 mL    09 Mar 2022 07:01  -  09 Mar 2022 14:11  --------------------------------------------------------  IN: 940 mL / OUT: 0 mL / NET: 940 mL        PHYSICAL EXAM:  HEAD:  Atraumatic, Normocephalic  NECK: Supple, No   JVD  CHEST/LUNG:   no     rales,     no,    rhonchi  HEART: Regular rate and rhythm;         murmur  ABDOMEN: Soft, Nontender, ;   EXTREMITIES:    no    edema  NEUROLOGY:  alert    LABS:                        11.3   3.73  )-----------( 243      ( 09 Mar 2022 07:13 )             33.5     03-09    142  |  108  |  8   ----------------------------<  105<H>  3.6   |  27  |  0.68    Ca    9.0      09 Mar 2022 07:09                      Thyroid Stimulating Hormone, Serum: 7.00 uIU/mL (03-05 @ 10:48)          Consultant(s) Notes Reviewed:      Care Discussed with Consultants/Other Providers:    
  afberile    REVIEW OF SYSTEMS:  GEN: no fever,    no chills  RESP: no SOB,   no cough  CVS: no chest pain,   no palpitations  GI: no abdominal pain,   no nausea,   no vomiting,   no constipation,   no diarrhea  : no dysuria,   no frequency  NEURO: no headache,   no dizziness  PSYCH: no depression,   not anxious  Derm : no rash    MEDICATIONS  (STANDING):  aspirin enteric coated 81 milliGRAM(s) Oral daily  heparin   Injectable 5000 Unit(s) SubCutaneous every 8 hours  lamoTRIgine 125 milliGRAM(s) Oral daily  lamoTRIgine 150 milliGRAM(s) Oral at bedtime  levothyroxine 25 MICROGram(s) Oral daily  LORazepam     Tablet 0.5 milliGRAM(s) Oral three times a day  QUEtiapine 200 milliGRAM(s) Oral at bedtime    MEDICATIONS  (PRN):      Vital Signs Last 24 Hrs  T(C): 36.1 (07 Mar 2022 06:00), Max: 36.4 (06 Mar 2022 12:32)  T(F): 96.9 (07 Mar 2022 06:00), Max: 97.6 (06 Mar 2022 20:36)  HR: 77 (07 Mar 2022 06:00) (74 - 85)  BP: 123/75 (07 Mar 2022 06:00) (121/85 - 140/83)  BP(mean): --  RR: 18 (07 Mar 2022 06:00) (18 - 18)  SpO2: 98% (07 Mar 2022 06:00) (98% - 99%)  CAPILLARY BLOOD GLUCOSE        I&O's Summary    06 Mar 2022 07:01  -  07 Mar 2022 07:00  --------------------------------------------------------  IN: 1290 mL / OUT: 400 mL / NET: 890 mL        PHYSICAL EXAM:  HEAD:  Atraumatic, Normocephalic  NECK: Supple, No   JVD  CHEST/LUNG:   no     rales,     no,    rhonchi  HEART: Regular rate and rhythm;         murmur  ABDOMEN: Soft, Nontender, ;   EXTREMITIES:    no    edema  NEUROLOGY:  alert    LABS:                          Thyroid Stimulating Hormone, Serum: 7.00 uIU/mL (03-05 @ 10:48)          Consultant(s) Notes Reviewed:      Care Discussed with Consultants/Other Providers:    
           CARDIOLOGY     PROGRESS  NOTE   ________________________________________________    CHIEF COMPLAINT:Patient is a 71y old  Female who presents with a chief complaint of altered mental status (04 Mar 2022 11:01)  doing better.  	  REVIEW OF SYSTEMS:  CONSTITUTIONAL: No fever, weight loss, or fatigue  EYES: No eye pain, visual disturbances, or discharge  ENT:  No difficulty hearing, tinnitus, vertigo; No sinus or throat pain  NECK: No pain or stiffness  RESPIRATORY: No cough, wheezing, chills or hemoptysis; No Shortness of Breath  CARDIOVASCULAR: No chest pain, palpitations, passing out, dizziness, or leg swelling  GASTROINTESTINAL: No abdominal or epigastric pain. No nausea, vomiting, or hematemesis; No diarrhea or constipation. No melena or hematochezia.  GENITOURINARY: No dysuria, frequency, hematuria, or incontinence  NEUROLOGICAL: No headaches, memory loss, loss of strength, numbness, or tremors  SKIN: No itching, burning, rashes, or lesions   LYMPH Nodes: No enlarged glands  ENDOCRINE: No heat or cold intolerance; No hair loss  MUSCULOSKELETAL: No joint pain or swelling; No muscle, back, or extremity pain  PSYCHIATRIC: No depression, anxiety, mood swings, or difficulty sleeping  HEME/LYMPH: No easy bruising, or bleeding gums  ALLERGY AND IMMUNOLOGIC: No hives or eczema	    [ ] All others negative	  [x ] Unable to obtain    PHYSICAL EXAM:  T(C): 37.1 (03-05-22 @ 04:53), Max: 37.1 (03-04-22 @ 09:09)  HR: 79 (03-05-22 @ 04:53) (74 - 85)  BP: 119/81 (03-05-22 @ 04:53) (102/68 - 133/83)  RR: 18 (03-05-22 @ 04:53) (18 - 18)  SpO2: 100% (03-05-22 @ 04:53) (96% - 100%)  Wt(kg): --  I&O's Summary    04 Mar 2022 07:01  -  05 Mar 2022 07:00  --------------------------------------------------------  IN: 870 mL / OUT: 200 mL / NET: 670 mL        Appearance: Normal	  HEENT:   Normal oral mucosa, PERRL, EOMI	  Lymphatic: No lymphadenopathy  Cardiovascular: Normal S1 S2, No JVD, + murmurs, No edema  Respiratory: rhonchi  Gastrointestinal:  Soft, Non-tender, + BS	  Skin: No rashes, No ecchymoses, No cyanosis	  Neurologic: Non-focal  Extremities: Normal range of motion, No clubbing, cyanosis or edema  Vascular: Peripheral pulses palpable 2+ bilaterally    MEDICATIONS  (STANDING):  aspirin enteric coated 81 milliGRAM(s) Oral daily  heparin   Injectable 5000 Unit(s) SubCutaneous every 8 hours  lamoTRIgine 125 milliGRAM(s) Oral daily  lamoTRIgine 150 milliGRAM(s) Oral at bedtime  LORazepam     Tablet 0.5 milliGRAM(s) Oral three times a day  QUEtiapine 200 milliGRAM(s) Oral at bedtime      TELEMETRY: 	    ECG:  	  RADIOLOGY:  OTHER: 	  	  LABS:	 	    CARDIAC MARKERS:                                11.4   3.49  )-----------( 221      ( 05 Mar 2022 05:32 )             32.6     03-05    145  |  113<H>  |  6<L>  ----------------------------<  102<H>  3.9   |  22  |  0.53    Ca    8.9      05 Mar 2022 05:32      proBNP:   Lipid Profile:   HgA1c:   TSH: Thyroid Stimulating Hormone, Serum: 4.05 uIU/mL (03-03 @ 00:56)    < from: TTE with Doppler (w/Cont) (03.04.22 @ 11:00) >  Mitral Valve: Normal mitral valve. Minimal mitral  regurgitation.  Aortic Valve/Aorta: Normal trileaflet aortic valve. Peak  transaortic valve gradient equals 12 mm Hg, mean  transaortic valve gradient equals 5 mm Hg, aortic valve  velocity time integral equals 23 cm, estimated aortic valve  area equals 2.8 sqcm. Peak left ventricular outflow tract  gradient equals 5 mm Hg, mean gradient is equal to 2 mm Hg,  LVOT velocity time integral equals 19 cm.  Aortic Root: 2.7 cm.  Sinotubular Junction: 3 cm.  Ascending Aorta: 3.4 cm.  LVOT diameter: 2.1 cm.  Left Atrium: Normal left atrium.  Left Ventricle: Normal left ventricular systolic function.   Endocardial visualization enhanced with intravenous  injection of Ultrasonic Enhancing Agent (Definity). Normal  left ventricular internal dimensions and wall thickness.  Normal diastolic function  Right Heart: Normal right atrium. The right ventricle is  not well visualized; grossly normal right ventricular  systolic function. Normal tricuspid valve. Mild tricuspid  regurgitation. Normal pulmonic valve.  Pericardium/Pleura: Pericardial fat pad noted.   Trace  pericardial effusion.  Hemodynamic: Estimated right atrial pressure is 8 mm Hg.  Estimated right ventricular systolic pressure equals 33 mm  Hg, assuming right atrial pressure equals 8 mm Hg,  consistent with normal pulmonary pressures.          Assessment and plan  ---------------------------  70 y/o female with PMHx of depression and anxiety was brought in by ambulance for being found on ground by landlord. Pt states she remembers feeling dizzy and falling down a set of stairs. She does not recall being found by her landlord. She also states her psych meds were recently being adjusted. Her Lexapro had been discontinued, then re-instated about 3-4 weeks ago. Since then she reported feeling more dizzy than usual. She also takes Seroquel and ativan. She endorsed depressed mood but denies any suicidal ideation or hallucination. Further history was limited as patient was lethargic.  Vitals: afebrile, HR 87, /70, SpO2 96% on room air.  pt with hx of depression/ anxiety who was found on the floor.  pt claims to take extra xanax  ecg with sig abnormality  echo noted with normal wall motion  tsh noted  lipid   asa daily  psych eval  neuro eval noted  dvt prophylaxis  POS orthostatic, gentle hydration, marcela stocking ?sec to Seroquel  stress test  	        
           CARDIOLOGY     PROGRESS  NOTE   ________________________________________________    CHIEF COMPLAINT:Patient is a 71y old  Female who presents with a chief complaint of altered mental status (06 Mar 2022 10:34)  no complain.  	  REVIEW OF SYSTEMS:  CONSTITUTIONAL: No fever, weight loss, or fatigue  EYES: No eye pain, visual disturbances, or discharge  ENT:  No difficulty hearing, tinnitus, vertigo; No sinus or throat pain  NECK: No pain or stiffness  RESPIRATORY: No cough, wheezing, chills or hemoptysis; No Shortness of Breath  CARDIOVASCULAR: No chest pain, palpitations, passing out, dizziness, or leg swelling  GASTROINTESTINAL: No abdominal or epigastric pain. No nausea, vomiting, or hematemesis; No diarrhea or constipation. No melena or hematochezia.  GENITOURINARY: No dysuria, frequency, hematuria, or incontinence  NEUROLOGICAL: No headaches, memory loss, loss of strength, numbness, or tremors  SKIN: No itching, burning, rashes, or lesions   LYMPH Nodes: No enlarged glands  ENDOCRINE: No heat or cold intolerance; No hair loss  MUSCULOSKELETAL: No joint pain or swelling; No muscle, back, or extremity pain  PSYCHIATRIC: No depression, anxiety, mood swings, or difficulty sleeping  HEME/LYMPH: No easy bruising, or bleeding gums  ALLERGY AND IMMUNOLOGIC: No hives or eczema	    [ ] All others negative	  [ ] Unable to obtain    PHYSICAL EXAM:  T(C): 36.1 (03-07-22 @ 06:00), Max: 36.4 (03-06-22 @ 12:32)  HR: 77 (03-07-22 @ 06:00) (74 - 85)  BP: 123/75 (03-07-22 @ 06:00) (121/85 - 140/83)  RR: 18 (03-07-22 @ 06:00) (18 - 18)  SpO2: 98% (03-07-22 @ 06:00) (98% - 99%)  Wt(kg): --  I&O's Summary    06 Mar 2022 07:01  -  07 Mar 2022 07:00  --------------------------------------------------------  IN: 1290 mL / OUT: 400 mL / NET: 890 mL        Appearance: Normal	  HEENT:   Normal oral mucosa, PERRL, EOMI	  Lymphatic: No lymphadenopathy  Cardiovascular: Normal S1 S2, No JVD, + murmurs, No edema  Respiratory: rhonchi  Psychiatry: A & O x 3, Mood & affect appropriate  Gastrointestinal:  Soft, Non-tender, + BS	  Skin: No rashes, No ecchymoses, No cyanosis	  Neurologic: Non-focal  Extremities: Normal range of motion, No clubbing, cyanosis or edema  Vascular: Peripheral pulses palpable 2+ bilaterally    MEDICATIONS  (STANDING):  aspirin enteric coated 81 milliGRAM(s) Oral daily  heparin   Injectable 5000 Unit(s) SubCutaneous every 8 hours  lamoTRIgine 125 milliGRAM(s) Oral daily  lamoTRIgine 150 milliGRAM(s) Oral at bedtime  LORazepam     Tablet 0.5 milliGRAM(s) Oral three times a day  QUEtiapine 200 milliGRAM(s) Oral at bedtime      TELEMETRY: 	    ECG:  	  RADIOLOGY:  OTHER: 	  	  LABS:	 	    CARDIAC MARKERS:                  proBNP:   Lipid Profile: Cholesterol 148  LDL --  HDL 45  TG 98    HgA1c:   TSH: Thyroid Stimulating Hormone, Serum: 7.00 uIU/mL (03-05 @ 10:48)  Thyroid Stimulating Hormone, Serum: 4.05 uIU/mL (03-03 @ 00:56)          Assessment and plan  ---------------------------  72 y/o female with PMHx of depression and anxiety was brought in by ambulance for being found on ground by landlord. Pt states she remembers feeling dizzy and falling down a set of stairs. She does not recall being found by her landlord. She also states her psych meds were recently being adjusted. Her Lexapro had been discontinued, then re-instated about 3-4 weeks ago. Since then she reported feeling more dizzy than usual. She also takes Seroquel and ativan. She endorsed depressed mood but denies any suicidal ideation or hallucination. Further history was limited as patient was lethargic.  Vitals: afebrile, HR 87, /70, SpO2 96% on room air.  pt with hx of depression/ anxiety who was found on the floor.  pt claims to take extra xanax  ecg with sig abnormality  echo noted with normal wall motion  tsh noted  lipid   asa daily  psych eval  neuro eval noted  dvt prophylaxis  POS orthostatic, gentle hydration, marcela stocking ?sec to Seroquel, repeat  pt can not tolerates stress test, plan for cardiac ct, discussed with family    	        
           CARDIOLOGY     PROGRESS  NOTE   ________________________________________________    CHIEF COMPLAINT:Patient is a 71y old  Female who presents with a chief complaint of altered mental status (07 Mar 2022 08:53)  no complain.  	  REVIEW OF SYSTEMS:  CONSTITUTIONAL: No fever, weight loss, or fatigue  EYES: No eye pain, visual disturbances, or discharge  ENT:  No difficulty hearing, tinnitus, vertigo; No sinus or throat pain  NECK: No pain or stiffness  RESPIRATORY: No cough, wheezing, chills or hemoptysis; No Shortness of Breath  CARDIOVASCULAR: No chest pain, palpitations, passing out, dizziness, or leg swelling  GASTROINTESTINAL: No abdominal or epigastric pain. No nausea, vomiting, or hematemesis; No diarrhea or constipation. No melena or hematochezia.  GENITOURINARY: No dysuria, frequency, hematuria, or incontinence  NEUROLOGICAL: No headaches, memory loss, loss of strength, numbness, or tremors  SKIN: No itching, burning, rashes, or lesions   LYMPH Nodes: No enlarged glands  ENDOCRINE: No heat or cold intolerance; No hair loss  MUSCULOSKELETAL: No joint pain or swelling; No muscle, back, or extremity pain  PSYCHIATRIC: No depression, anxiety, mood swings, or difficulty sleeping  HEME/LYMPH: No easy bruising, or bleeding gums  ALLERGY AND IMMUNOLOGIC: No hives or eczema	    [ ] All others negative	  [x ] Unable to obtain    PHYSICAL EXAM:  T(C): 36.9 (03-08-22 @ 05:10), Max: 37.1 (03-07-22 @ 09:25)  HR: 78 (03-08-22 @ 05:10) (68 - 78)  BP: 126/86 (03-08-22 @ 05:10) (116/79 - 136/82)  RR: 18 (03-08-22 @ 05:10) (18 - 18)  SpO2: 98% (03-08-22 @ 05:10) (97% - 98%)  Wt(kg): --  I&O's Summary    07 Mar 2022 07:01  -  08 Mar 2022 07:00  --------------------------------------------------------  IN: 920 mL / OUT: 0 mL / NET: 920 mL        Appearance: Normal	  HEENT:   Normal oral mucosa, PERRL, EOMI	  Lymphatic: No lymphadenopathy  Cardiovascular: Normal S1 S2, No JVD, + murmurs, No edema  Respiratory: Lungs clear to auscultation	  Psychiatry: A & O x 3, Mood & affect appropriate  Gastrointestinal:  Soft, Non-tender, + BS	  Skin: No rashes, No ecchymoses, No cyanosis	  Neurologic: Non-focal  Extremities: Normal range of motion, No clubbing, cyanosis or edema  Vascular: Peripheral pulses palpable 2+ bilaterally    MEDICATIONS  (STANDING):  apixaban 10 milliGRAM(s) Oral two times a day  aspirin enteric coated 81 milliGRAM(s) Oral daily  lamoTRIgine 125 milliGRAM(s) Oral daily  lamoTRIgine 150 milliGRAM(s) Oral at bedtime  levothyroxine 25 MICROGram(s) Oral daily  LORazepam     Tablet 0.5 milliGRAM(s) Oral three times a day  QUEtiapine 200 milliGRAM(s) Oral at bedtime  senna 2 Tablet(s) Oral at bedtime      TELEMETRY: 	    ECG:  	  RADIOLOGY:  OTHER: 	  	  LABS:	 	    CARDIAC MARKERS:                  proBNP:   Lipid Profile: Cholesterol 148  LDL --  HDL 45  TG 98    HgA1c:   TSH: Thyroid Stimulating Hormone, Serum: 7.00 uIU/mL (03-05 @ 10:48)  Thyroid Stimulating Hormone, Serum: 4.05 uIU/mL (03-03 @ 00:56)    < from: CT Angio Heart and Coronaries w/ IV Cont (03.07.22 @ 14:35) >  1.  Coronary artery disease:  LM:  minimal noncalcified plaque  LAD:  minimal (<30%) calcified plaque at the ostium  minimal (<30%) noncalcified and calcified plaque in the proximal segment  mild (30-49%) predominantly calcified plaque in the mid segment  minimal (<30%) noncalcified and calcified plaque in the distal segment  LCX:  moderate (approximately 50%) mixed noncalcified and calcified plaque   extending from the ostium to the proximal segment  mild (30-49%) noncalcified and calcified plaque in the distal segment  mild (30-49%) noncalcified and calcified plaque in the OM branch  RCA:  mild (30-49%) noncalcified and calcified plaque in the proximal segment  minimal (<30%) noncalcified plaque in the PDA and RPL branch  2.  Severe coronary artery calcium (Agatston score 421) as delineated   above.  3. Left upper lobe subsegmental pulmonary arterial embolus seen within   the partially imaged lungs. A dedicated CT pulmonary angiogram is   recommended for complete evaluation.  4. Acute appearing fractures of the right fifth and sixth ribs.          Assessment and plan  ---------------------------  72 y/o female with PMHx of depression and anxiety was brought in by ambulance for being found on ground by landlord. Pt states she remembers feeling dizzy and falling down a set of stairs. She does not recall being found by her landlord. She also states her psych meds were recently being adjusted. Her Lexapro had been discontinued, then re-instated about 3-4 weeks ago. Since then she reported feeling more dizzy than usual. She also takes Seroquel and ativan. She endorsed depressed mood but denies any suicidal ideation or hallucination. Further history was limited as patient was lethargic.  Vitals: afebrile, HR 87, /70, SpO2 96% on room air.  pt with hx of depression/ anxiety who was found on the floor.  pt claims to take extra xanax  ecg with sig abnormality  echo noted with normal wall motion  tsh noted  lipid   asa daily  psych eval  neuro eval noted  dvt prophylaxis  POS orthostatic, gentle hydration, marcela stocking ?sec to Seroquel, repeat  cardiac ct noted  no objection pt transfer to [facility] cardiac wise, started on eliquis for PE  check le venous doppler    	        
           CARDIOLOGY     PROGRESS  NOTE   ________________________________________________    CHIEF COMPLAINT:Patient is a 71y old  Female who presents with a chief complaint of altered mental status (08 Mar 2022 09:24)  no complain.  	  REVIEW OF SYSTEMS:  CONSTITUTIONAL: No fever, weight loss, or fatigue  EYES: No eye pain, visual disturbances, or discharge  ENT:  No difficulty hearing, tinnitus, vertigo; No sinus or throat pain  NECK: No pain or stiffness  RESPIRATORY: No cough, wheezing, chills or hemoptysis; No Shortness of Breath  CARDIOVASCULAR: No chest pain, palpitations, passing out, dizziness, or leg swelling  GASTROINTESTINAL: No abdominal or epigastric pain. No nausea, vomiting, or hematemesis; No diarrhea or constipation. No melena or hematochezia.  GENITOURINARY: No dysuria, frequency, hematuria, or incontinence  NEUROLOGICAL: No headaches, memory loss, loss of strength, numbness, or tremors  SKIN: No itching, burning, rashes, or lesions   LYMPH Nodes: No enlarged glands  ENDOCRINE: No heat or cold intolerance; No hair loss  MUSCULOSKELETAL: No joint pain or swelling; No muscle, back, or extremity pain  PSYCHIATRIC: No depression, anxiety, mood swings, or difficulty sleeping  HEME/LYMPH: No easy bruising, or bleeding gums  ALLERGY AND IMMUNOLOGIC: No hives or eczema	    [ ] All others negative	  [ ] Unable to obtain    PHYSICAL EXAM:  T(C): 37.1 (03-09-22 @ 05:02), Max: 37.3 (03-08-22 @ 09:41)  HR: 72 (03-09-22 @ 05:02) (72 - 83)  BP: 110/75 (03-09-22 @ 05:02) (110/75 - 153/89)  RR: 18 (03-09-22 @ 05:02) (18 - 18)  SpO2: 97% (03-09-22 @ 05:02) (96% - 100%)  Wt(kg): --  I&O's Summary    08 Mar 2022 07:01  -  09 Mar 2022 07:00  --------------------------------------------------------  IN: 470 mL / OUT: 800 mL / NET: -330 mL        Appearance: Normal	  HEENT:   Normal oral mucosa, PERRL, EOMI	  Lymphatic: No lymphadenopathy  Cardiovascular: Normal S1 S2, No JVD, + murmurs, No edema  Respiratory: Lungs clear to auscultation	  Psychiatry: A & O x 3, Mood & affect appropriate  Gastrointestinal:  Soft, Non-tender, + BS	  Skin: No rashes, No ecchymoses, No cyanosis	  Neurologic: Non-focal  Extremities: Normal range of motion, No clubbing, cyanosis or edema  Vascular: Peripheral pulses palpable 2+ bilaterally    MEDICATIONS  (STANDING):  apixaban 10 milliGRAM(s) Oral two times a day  aspirin enteric coated 81 milliGRAM(s) Oral daily  lamoTRIgine 125 milliGRAM(s) Oral daily  lamoTRIgine 150 milliGRAM(s) Oral at bedtime  levothyroxine 25 MICROGram(s) Oral daily  LORazepam     Tablet 0.5 milliGRAM(s) Oral three times a day  QUEtiapine 200 milliGRAM(s) Oral at bedtime      TELEMETRY: 	    ECG:  	  RADIOLOGY:  OTHER: 	  	  LABS:	 	    CARDIAC MARKERS:                                11.7   3.89  )-----------( 226      ( 08 Mar 2022 07:31 )             34.6     03-08    142  |  104  |  8   ----------------------------<  116<H>  3.9   |  28  |  0.64    Ca    9.4      08 Mar 2022 10:30      proBNP:   Lipid Profile: Cholesterol 148  LDL --  HDL 45  TG 98    HgA1c:   TSH: Thyroid Stimulating Hormone, Serum: 7.00 uIU/mL (03-05 @ 10:48)  Thyroid Stimulating Hormone, Serum: 4.05 uIU/mL (03-03 @ 00:56)    < from: TTE with Doppler (w/Cont) (03.04.22 @ 11:00) >  1. Aortic Root: 2.7cm.  Sinotubular Junction: 3 cm.  Ascending Aorta: 3.4 cm.  LVOT diameter: 2.1 cm.  2. Normal left ventricular internal dimensions and wall  thickness.  3. Normal left ventricular systolic function.   Endocardial  visualization enhanced with intravenous injection of  Ultrasonic Enhancing Agent (Definity).  4. Pericardial fat pad noted.   Trace pericardial effusion.        Assessment and plan  ---------------------------  72 y/o female with PMHx of depression and anxiety was brought in by ambulance for being found on ground by landlord. Pt states she remembers feeling dizzy and falling down a set of stairs. She does not recall being found by her landlord. She also states her psych meds were recently being adjusted. Her Lexapro had been discontinued, then re-instated about 3-4 weeks ago. Since then she reported feeling more dizzy than usual. She also takes Seroquel and ativan. She endorsed depressed mood but denies any suicidal ideation or hallucination. Further history was limited as patient was lethargic.  Vitals: afebrile, HR 87, /70, SpO2 96% on room air.  pt with hx of depression/ anxiety who was found on the floor.  pt claims to take extra xanax  ecg with sig abnormality  echo noted with normal wall motion  tsh noted  lipid   asa daily  psych eval  neuro eval noted  dvt prophylaxis  POS orthostatic, gentle hydration, marcela stocking ?sec to Seroquel, repeat  cardiac ct noted, no sig cad, + PE  PE, continue AC  check le venous doppler    	        
    afberile  REVIEW OF SYSTEMS:  GEN: no fever,    no chills  RESP: no SOB,   no cough  CVS: no chest pain,   no palpitations  GI: no abdominal pain,   no nausea,   no vomiting,   no constipation,   no diarrhea  : no dysuria,   no frequency  NEURO: no headache,   no dizziness  PSYCH: no depression,   not anxious  Derm : no rash    MEDICATIONS  (STANDING):  aspirin enteric coated 81 milliGRAM(s) Oral daily  heparin   Injectable 5000 Unit(s) SubCutaneous every 8 hours  lactated ringers. 1000 milliLiter(s) (75 mL/Hr) IV Continuous <Continuous>  lamoTRIgine 125 milliGRAM(s) Oral daily  lamoTRIgine 150 milliGRAM(s) Oral at bedtime    MEDICATIONS  (PRN):      Vital Signs Last 24 Hrs  T(C): 37.1 (04 Mar 2022 09:09), Max: 37.1 (03 Mar 2022 16:39)  T(F): 98.7 (04 Mar 2022 09:09), Max: 98.7 (03 Mar 2022 16:39)  HR: 85 (04 Mar 2022 09:09) (76 - 91)  BP: 112/72 (04 Mar 2022 09:09) (100/64 - 120/78)  BP(mean): --  RR: 18 (04 Mar 2022 09:09) (18 - 18)  SpO2: 97% (04 Mar 2022 09:09) (96% - 99%)  CAPILLARY BLOOD GLUCOSE        I&O's Summary    03 Mar 2022 07:01  -  04 Mar 2022 07:00  --------------------------------------------------------  IN: 3340 mL / OUT: 750 mL / NET: 2590 mL    04 Mar 2022 07:01  -  04 Mar 2022 11:02  --------------------------------------------------------  IN: 340 mL / OUT: 100 mL / NET: 240 mL        PHYSICAL EXAM:  HEAD:  Atraumatic, Normocephalic  NECK: Supple, No   JVD  CHEST/LUNG:   no     rales,     no,    rhonchi  HEART: Regular rate and rhythm;         murmur  ABDOMEN: Soft, Nontender, ;   EXTREMITIES:     no   edema  NEUROLOGY:  alert    LABS:                        11.5   4.66  )-----------( 217      ( 04 Mar 2022 07:19 )             33.4         139  |  106  |  14  ----------------------------<  109<H>  3.1<L>   |  23  |  0.53    Ca    8.6      04 Mar 2022 07:16    TPro  6.4  /  Alb  3.9  /  TBili  1.1  /  DBili  x   /  AST  34  /  ALT  17  /  AlkPhos  72      PT/INR - ( 02 Mar 2022 19:42 )   PT: 15.7 sec;   INR: 1.36 ratio         PTT - ( 02 Mar 2022 19:42 )  PTT:25.8 sec  CARDIAC MARKERS ( 02 Mar 2022 19:42 )  x     / x     / 334 U/L / x     / x          Urinalysis Basic - ( 03 Mar 2022 19:18 )    Color: Yellow / Appearance: Clear / S.042 / pH: x  Gluc: x / Ketone: Large  / Bili: Small / Urobili: 3 mg/dL   Blood: x / Protein: 30 mg/dL / Nitrite: Negative   Leuk Esterase: Negative / RBC: 2 /hpf / WBC 2 /HPF   Sq Epi: x / Non Sq Epi: x / Bacteria: Negative           @ 19:50  3.5  53      Thyroid Stimulating Hormone, Serum: 4.05 uIU/mL ( @ 00:56)          Consultant(s) Notes Reviewed:      Care Discussed with Consultants/Other Providers:    
  afberile    REVIEW OF SYSTEMS:  GEN: no fever,    no chills  RESP: no SOB,   no cough  CVS: no chest pain,   no palpitations  GI: no abdominal pain,   no nausea,   no vomiting,   no constipation,   no diarrhea  : no dysuria,   no frequency  NEURO: no headache,   no dizziness  PSYCH: no depression,   not anxious  Derm : no rash    MEDICATIONS  (STANDING):  aspirin enteric coated 81 milliGRAM(s) Oral daily  heparin   Injectable 5000 Unit(s) SubCutaneous every 8 hours  lamoTRIgine 125 milliGRAM(s) Oral daily  lamoTRIgine 150 milliGRAM(s) Oral at bedtime  LORazepam     Tablet 0.5 milliGRAM(s) Oral three times a day  QUEtiapine 200 milliGRAM(s) Oral at bedtime  sodium chloride 0.9%. 500 milliLiter(s) (50 mL/Hr) IV Continuous <Continuous>    MEDICATIONS  (PRN):      Vital Signs Last 24 Hrs  T(C): 36.2 (06 Mar 2022 09:28), Max: 37.2 (05 Mar 2022 13:59)  T(F): 97.1 (06 Mar 2022 09:28), Max: 99 (05 Mar 2022 13:59)  HR: 83 (06 Mar 2022 09:28) (70 - 83)  BP: 121/85 (06 Mar 2022 09:28) (112/72 - 132/87)  BP(mean): --  RR: 18 (06 Mar 2022 09:28) (18 - 18)  SpO2: 98% (06 Mar 2022 09:28) (97% - 100%)  CAPILLARY BLOOD GLUCOSE        I&O's Summary    05 Mar 2022 07:01  -  06 Mar 2022 07:00  --------------------------------------------------------  IN: 1640 mL / OUT: 750 mL / NET: 890 mL        PHYSICAL EXAM:  HEAD:  Atraumatic, Normocephalic  NECK: Supple, No   JVD  CHEST/LUNG:   no     rales,     no,    rhonchi  HEART: Regular rate and rhythm;         murmur  ABDOMEN: Soft, Nontender, ;   EXTREMITIES:    no    edema  NEUROLOGY:  alert    LABS:                        11.4   3.49  )-----------( 221      ( 05 Mar 2022 05:32 )             32.6     03-05    145  |  113<H>  |  6<L>  ----------------------------<  102<H>  3.9   |  22  |  0.53    Ca    8.9      05 Mar 2022 05:32                      Thyroid Stimulating Hormone, Serum: 7.00 uIU/mL (03-05 @ 10:48)          Consultant(s) Notes Reviewed:      Care Discussed with Consultants/Other Providers:    
  afberile    REVIEW OF SYSTEMS:  GEN: no fever,    no chills  RESP: no SOB,   no cough  CVS: no chest pain,   no palpitations  GI: no abdominal pain,   no nausea,   no vomiting,   no constipation,   no diarrhea  : no dysuria,   no frequency  NEURO: no headache,   no dizziness  PSYCH: no depression,   not anxious  Derm : no rash    MEDICATIONS  (STANDING):  apixaban 10 milliGRAM(s) Oral two times a day  aspirin enteric coated 81 milliGRAM(s) Oral daily  lamoTRIgine 125 milliGRAM(s) Oral daily  lamoTRIgine 150 milliGRAM(s) Oral at bedtime  levothyroxine 25 MICROGram(s) Oral daily  LORazepam     Tablet 0.5 milliGRAM(s) Oral three times a day  QUEtiapine 200 milliGRAM(s) Oral at bedtime  senna 2 Tablet(s) Oral at bedtime    MEDICATIONS  (PRN):      Vital Signs Last 24 Hrs  T(C): 36.9 (08 Mar 2022 05:10), Max: 37.1 (07 Mar 2022 09:25)  T(F): 98.5 (08 Mar 2022 05:10), Max: 98.8 (07 Mar 2022 09:25)  HR: 78 (08 Mar 2022 05:10) (68 - 78)  BP: 126/86 (08 Mar 2022 05:10) (116/79 - 136/82)  BP(mean): --  RR: 18 (08 Mar 2022 05:10) (18 - 18)  SpO2: 98% (08 Mar 2022 05:10) (97% - 98%)  CAPILLARY BLOOD GLUCOSE        I&O's Summary    07 Mar 2022 07:01  -  08 Mar 2022 07:00  --------------------------------------------------------  IN: 920 mL / OUT: 0 mL / NET: 920 mL        PHYSICAL EXAM:  HEAD:  Atraumatic, Normocephalic  NECK: Supple, No   JVD  CHEST/LUNG:   no     rales,     no,    rhonchi  HEART: Regular rate and rhythm;         murmur  ABDOMEN: Soft, Nontender, ;   EXTREMITIES:    no    edema  NEUROLOGY:  alert    LABS:                        11.7   3.89  )-----------( 226      ( 08 Mar 2022 07:31 )             34.6                           Thyroid Stimulating Hormone, Serum: 7.00 uIU/mL (03-05 @ 10:48)          Consultant(s) Notes Reviewed:      Care Discussed with Consultants/Other Providers:    
  afebrikle    REVIEW OF SYSTEMS:  GEN: no fever,    no chills  RESP: no SOB,   no cough  CVS: no chest pain,   no palpitations  GI: no abdominal pain,   no nausea,   no vomiting,   no constipation,   no diarrhea  : no dysuria,   no frequency  NEURO: no headache,   no dizziness  PSYCH: no depression,   not anxious  Derm : no rash    MEDICATIONS  (STANDING):  aspirin enteric coated 81 milliGRAM(s) Oral daily  lamoTRIgine 125 milliGRAM(s) Oral daily  lamoTRIgine 150 milliGRAM(s) Oral at bedtime  levothyroxine 25 MICROGram(s) Oral daily  LORazepam     Tablet 0.5 milliGRAM(s) Oral three times a day  QUEtiapine 200 milliGRAM(s) Oral at bedtime    MEDICATIONS  (PRN):      Vital Signs Last 24 Hrs  T(C): 36.7 (10 Mar 2022 04:41), Max: 37.2 (10 Mar 2022 00:23)  T(F): 98.1 (10 Mar 2022 04:41), Max: 98.9 (10 Mar 2022 00:23)  HR: 83 (10 Mar 2022 04:41) (74 - 90)  BP: 125/73 (10 Mar 2022 04:41) (97/57 - 136/91)  BP(mean): --  RR: 18 (10 Mar 2022 04:41) (18 - 18)  SpO2: 98% (10 Mar 2022 04:41) (95% - 100%)  CAPILLARY BLOOD GLUCOSE        I&O's Summary    09 Mar 2022 07:01  -  10 Mar 2022 07:00  --------------------------------------------------------  IN: 1180 mL / OUT: 0 mL / NET: 1180 mL    10 Mar 2022 07:01  -  10 Mar 2022 08:37  --------------------------------------------------------  IN: 0 mL / OUT: 450 mL / NET: -450 mL        PHYSICAL EXAM:  HEAD:  Atraumatic, Normocephalic  NECK: Supple, No   JVD  CHEST/LUNG:   no     rales,     no,    rhonchi  HEART: Regular rate and rhythm;         murmur  ABDOMEN: Soft, Nontender, ;   EXTREMITIES:    no    edema  NEUROLOGY:  alert    LABS:                        11.3   3.73  )-----------( 243      ( 09 Mar 2022 07:13 )             33.5     03-09    142  |  108  |  8   ----------------------------<  105<H>  3.6   |  27  |  0.68    Ca    9.0      09 Mar 2022 07:09                      Thyroid Stimulating Hormone, Serum: 7.00 uIU/mL (03-05 @ 10:48)          Consultant(s) Notes Reviewed:      Care Discussed with Consultants/Other Providers:

## 2022-03-10 NOTE — BH PATIENT PROFILE - HOME MEDICATIONS
aspirin 81 mg oral delayed release tablet , 1 tab(s) orally once a day  levothyroxine 25 mcg (0.025 mg) oral tablet , 1 tab(s) orally once a day  QUEtiapine 200 mg oral tablet , 1 tab(s) orally once a day (at bedtime)  LORazepam 0.5 mg oral tablet , 1 tab(s) orally 3 times a day  lamoTRIgine 150 mg oral tablet , 1 tab(s) orally once a day (at bedtime)  lamoTRIgine 25 mg oral tablet , 5 tab(s) orally once a day

## 2022-03-10 NOTE — PROGRESS NOTE ADULT - ASSESSMENT
72 y/o female     with PMHx of depression and anxiety  pt admits  to prior  suicidal attempt, trs  ago/   states  she is divorved  and celis s no children      was brought in by ambulance for being found on ground by landlord.   ct  head,  normal/  be positive for PCP.  admits to taking  about 90 pills of ativan   U tox positive for PCP/  pt  denies  using any  drugs    h/o depression, anxiety   pt   is  alert,  able  to answer  all questions   psych eval,  told  pt needs transfer  to   [facility]  facility/  form  filled/ S/W  awarew   abnormal  ekg.  st  depressions/ echo, normal  ef   per  card, need s ischemic  w/p/     awiat card  clearance   prior to  transfer to psych facility   on dvt ppx /   cardiac ct,  some  cad/ rib fx's, from fall    PE  noted,  hence on eliquis, awiating  doppler  r legs      ct angio  chest, no PE/  seen by pulm     a/c  was  stopped   s pt ha s been cleared   for transfer to psych facility

## 2022-03-10 NOTE — BH INPATIENT PSYCHIATRY ASSESSMENT NOTE - NSBHASSESSSUMMFT_PSY_ALL_CORE
Ms. Salazar is a 71 woman with prior psychiatric history of depression and prior suicide attempts (via OD), who carries a hx of Bipolar 2 disaorder, 2 psych admissions (1987, 1992),  no h/o substance abuse, transferred to [facility] from [facility] for management of depression after being stabilized for  AMS related to a suicide attempts via OD on 90 tabs Ativan.    Ms Salazar lives alone, she is , has no children and is retired . She still volunteers sometimes in school and identifies her brother as her main source of support.    On initial assessment Ms. Salazar reports worsening depression of 1 year evolution with recent suicide attempt vis overdose on ativan, lamictal and seroquel. Pt reports low mood, anxiety, feelings of worthlessness, hopelessness and overwhelming loneliness. Denies active SIIP, contracts for safety. Predisposing factors include chronic hx of depression, unclear hx of bipolar 2 disorder and multiple failed antidepressant trials. Precipitating factors include 2 recent significant losses: her psychiatrist of 30 years (Dr. Kong) retired and one of her closest friends moved to [place]. Ms. Salazar appears to be unhappy with her new treatment team, idealizing Dr. Kong and describing her new psychiatrist and therapist as dismissive and uncaring. Ms. Salzaar was tearful while sharing her story but expressed a sense of hope that with ECT she will once again feel like herself again.     Plan:  -Admit to [facility]  -2PC  -Routine checks  -Continue Lamictal 125mg QD +150mg QHS  -Seroquel 200mg QHS  -Pt has been taking ativan 0.5mg TID at [facility], will begin tapering by lowering her morning dose to 0.25mg  -ECT consult and clearance  -Pt would benefit from 1:1 psychotherapy, will refer to Dr. Rossi.

## 2022-03-10 NOTE — BH INPATIENT PSYCHIATRY ASSESSMENT NOTE - HPI (INCLUDE ILLNESS QUALITY, SEVERITY, DURATION, TIMING, CONTEXT, MODIFYING FACTORS, ASSOCIATED SIGNS AND SYMPTOMS)
Ms. Salazar is a 71 woman with prior psychiatric history of depression and prior suicide attempts (via OD), who carries a hx of Bipolar 2 disaorder, 2 psych admissions (1987, 1992),  no h/o substance abuse, transferred to [facility] from [facility] for management of depression after being stabilized for  AMS related to a suicide attempts via OD on 90 tabs Ativan.    Ms Salazar lives alone, she is , has no children and is retired . She still volunteers sometimes in school and identifies her brother as her main source of support.    On initial assessment upon arrival to  Ms. Salazar is calm and cooperative. She is tearful at times while sharing her story. Reports struggling with depression since her 20ies, starting after her  left her. Reports 1 episode which has been described in the past as a manic episode, however unclear. Pt describes feeling very happy, buying clothes in the context of being in a very happy relationship with a man. She reports having "lots of sex" which she was told was a sign on ahsan, however she reports having sex with just her partner and using protection. Reports sleeping normally during that period of 6 months. Reports that after this she only experienced on and off depressive episodes. Pt states that this episode started about 1 year ago and was worsened by her psychiatrist of 30 years, Dr. Dilan pereira and her close friend moving to [place]. Pt narrates "I can even describe to you the exact moment it started, I was sitting in my living room, I have a bright adrián living room, I looked behind me and it all changed. I thought I can't do "It" anymore...but I don't even know what "it" means." Pt reports low mood, hopelessness, anhedonia over this past year but still pushing herself to volunteer in school and take care of herself. She identifies having her friend move to [place] as a huge trigger as it made her contemplate her own life in comparison, in a way that made her feel less of a "go getter". She denies having planned her suicide attempt, however she told the psych CL team she left a few signed blank checks and a journal entry describing her feelings of depression. Ms. Salazar reports that the day of her overdose she had actually met with her new psychiatrist and found the encounter unhelpful. Describes seeing the pill bottles and again feeling "I can't do it anymore" and drinking  90 tablets of ativan 0.5 mg, 2 tabs Seroquel 200mg and 2 tabs Lamictal 100mg. Her landlord found on the floor.             As per psych CL assessment: "On initial interview patient is AOx4, calm and cooperative, lying in bed. States that she has been feeling very depressed since her psychiatrist of 35 years retired and her close friend moved to [place] around the same time. Reports she made the plan to suicide via OD by taking 90 tablets of ativan 0.5 mg, 2 tabs Seroquel 200mg and 2 tabs Lamictal 100mg.  With regard to preparations, patient states she left behind her journal which described her feelings of depression and blank checks with her signature before taking the pills. She reports that the attempt was both planned and impulsive; states on the day of, she felt as if "she can't do this anymore." States she is upset that it didn't work.  Endorses persecutory delusions that the "devil is trying to get me." Explains that she feels like shes being punished by the devil. Denies alcohol, tobacco, or drug use. Denies AVH, HI, or current manic sx.     Collateral obtained with permission from brother (Sonny): [facility] patient has a history of depression and anxiety for over 40 years now, which started after a bad divorce. Explains that patient has ongoing specific anxieties (ie: driving, elevators) that she takes Ativan for. Patient recently switched psychiatrists to Dr. Car and medications were changed.  Reports pt has a history of 2 prior psych admissions (1st 40 years ago from cutting and 2nd was 20 years ago from overdose).     Spoke with OP psychiatrist Dr. Car who last saw pt on 2/28; states pt was c/o depression without psychosis or SI at that time, and Lamictal was increased.  Pt had recently self-initiated Lexapro for about 13 days from old supply, which he recommended she discontinue as it had not helped her previously.  Pt was continued on Ativan and Seroquel which she has been on for a long time.  [facility] pt was transferred to his care from the prior psychiatrist who retired in November. Ms. Salazar is a 71 woman with prior psychiatric history of depression and prior suicide attempts (via OD), who carries a hx of Bipolar 2 disaorder, 2 psych admissions (1987, 1992),  no h/o substance abuse, transferred to [facility] from [facility] for management of depression after being stabilized for  AMS related to a suicide attempts via OD on 90 tabs Ativan.    Ms Salazar lives alone, she is , has no children and is retired . She still volunteers sometimes in school and identifies her brother as her main source of support.    On initial assessment upon arrival to  Ms. Salazar is calm and cooperative. She is tearful at times while sharing her story. Reports struggling with depression since her 20ies, starting after her  left her. Reports 1 episode which has been described in the past as a manic episode, however unclear. Pt describes feeling very happy, buying clothes in the context of being in a very happy relationship with a man. She reports having "lots of sex" which she was told was a sign on ahsan, however she reports having sex with just her partner and using protection. Reports sleeping normally during that period of 6 months. Reports that after this she only experienced on and off depressive episodes. Pt states that this episode started about 1 year ago and was worsened by her psychiatrist of 30 years, Dr. Dilan pereira and her close friend moving to [place]. Pt narrates "I can even describe to you the exact moment it started, I was sitting in my living room, I have a bright adrián living room, I looked behind me and it all changed. I thought I can't do "It" anymore...but I don't even know what "it" means." Pt reports low mood, hopelessness, anhedonia over this past year but still pushing herself to volunteer in school and take care of herself. She identifies having her friend move to [place] as a huge trigger as it made her contemplate her own life in comparison, in a way that made her feel less of a "go getter". She denies having planned her suicide attempt, however she told the psych CL team she left a few signed blank checks and a journal entry describing her feelings of depression. Ms. Salazar reports that the day of her overdose she had actually met with her new psychiatrist and found the encounter unhelpful. Describes seeing the pill bottles and again feeling "I can't do it anymore" and drinking  90 tablets of ativan 0.5 mg, 2 tabs Seroquel 200mg and 2 tabs Lamictal 100mg. Her landlord found on the floor.   Pt reports at this time wanting to get better, denies active SIIP and contracts for safety. She reports benefitting from ECT treatments in the past and is on board with trying ECT again. No psychotic/paranoid sx elicited.     As per psych CL assessment: "On initial interview patient is AOx4, calm and cooperative, lying in bed. States that she has been feeling very depressed since her psychiatrist of 35 years retired and her close friend moved to [place] around the same time. Reports she made the plan to suicide via OD by taking 90 tablets of ativan 0.5 mg, 2 tabs Seroquel 200mg and 2 tabs Lamictal 100mg.  With regard to preparations, patient states she left behind her journal which described her feelings of depression and blank checks with her signature before taking the pills. She reports that the attempt was both planned and impulsive; states on the day of, she felt as if "she can't do this anymore." States she is upset that it didn't work.  Endorses persecutory delusions that the "devil is trying to get me." Explains that she feels like shes being punished by the devil. Denies alcohol, tobacco, or drug use. Denies AVH, HI, or current manic sx.     Collateral obtained with permission from brother (Sonny): States patient has a history of depression and anxiety for over 40 years now, which started after a bad divorce. Explains that patient has ongoing specific anxieties (ie: driving, elevators) that she takes Ativan for. Patient recently switched psychiatrists to Dr. Car and medications were changed.  Reports pt has a history of 2 prior psych admissions (1st 40 years ago from cutting and 2nd was 20 years ago from overdose).     Spoke with OP psychiatrist Dr. Car who last saw pt on 2/28; states pt was c/o depression without psychosis or SI at that time, and Lamictal was increased.  Pt had recently self-initiated Lexapro for about 13 days from old supply, which he recommended she discontinue as it had not helped her previously.  Pt was continued on Ativan and Seroquel which she has been on for a long time.  States pt was transferred to his care from the prior psychiatrist who retired in November.

## 2022-03-10 NOTE — BH PATIENT PROFILE - FALL HARM RISK - HARM RISK INTERVENTIONS

## 2022-03-10 NOTE — PHYSICAL THERAPY INITIAL EVALUATION ADULT - ADDITIONAL COMMENTS
Patient reports living alone in private apartment in [place], [place]; 13 steps to enter. Patient reports being independent with ADLs, and ambulation, pta; no assistive equipment/persons required. Patient denies any in-home services, pta. Patient reports that she was seeing a psychiatrist (Dr. Car) and a therapist in the community, however reports that they have worsened her depression. Patient reports living alone in private apartment in [place], [place]; 13 steps to enter. Patient reports being independent with ADLs, and ambulation, pta; no assistive equipment/persons required. Patient denies any in-home services, pta. Patient reports that she was seeing a psychiatrist (Dr. Car) and a therapist in the community, however reports that they have worsened her depression. (-) working: retired . (+) tub with grab bars. (+) driving.

## 2022-03-10 NOTE — BH CONSULTATION LIAISON PROGRESS NOTE - NSBHFUPREASONCONS_PSY_A_CORE
suicidality/depression

## 2022-03-10 NOTE — BH CONSULTATION LIAISON PROGRESS NOTE - NSBHCHARTREVIEWINVESTIGATE_PSY_A_CORE FT
< from: 12 Lead ECG (03.03.22 @ 10:50) >      Ventricular Rate 82 BPM    Atrial Rate 82 BPM    P-R Interval 146 ms    QRS Duration 84 ms    Q-T Interval 380 ms    QTC Calculation(Bazett) 443 ms    P Axis 42 degrees    R Axis 23 degrees    T Axis 17 degrees    Diagnosis Line *** POOR DATA QUALITY, INTERPRETATION MAY BE ADVERSELY AFFECTED  NORMAL SINUS RHYTHM  T WAVE ABNORMALITY, CONSIDER ANTEROLATERAL ISCHEMIA  ABNORMAL ECG  NO PREVIOUS ECGS AVAILABLE  Confirmed by LOYDA OLIVEIRA MD ([id number]) on 3/4/2022 11:10:35 AM    < end of copied text >    
< from: 12 Lead ECG (03.03.22 @ 10:50) >      Ventricular Rate 82 BPM    Atrial Rate 82 BPM    P-R Interval 146 ms    QRS Duration 84 ms    Q-T Interval 380 ms    QTC Calculation(Bazett) 443 ms    P Axis 42 degrees    R Axis 23 degrees    T Axis 17 degrees    Diagnosis Line *** POOR DATA QUALITY, INTERPRETATION MAY BE ADVERSELY AFFECTED  NORMAL SINUS RHYTHM  T WAVE ABNORMALITY, CONSIDER ANTEROLATERAL ISCHEMIA  ABNORMAL ECG  NO PREVIOUS ECGS AVAILABLE  Confirmed by LOYDA OLIVEIRA MD ([id number]) on 3/4/2022 11:10:35 AM    < end of copied text >    
< from: 12 Lead ECG (03.03.22 @ 10:50) >      Ventricular Rate 82 BPM    Atrial Rate 82 BPM    P-R Interval 146 ms    QRS Duration 84 ms    Q-T Interval 380 ms    QTC Calculation(Bazett) 443 ms    P Axis 42 degrees    R Axis 23 degrees    T Axis 17 degrees    Diagnosis Line *** POOR DATA QUALITY, INTERPRETATION MAY BE ADVERSELY AFFECTED  NORMAL SINUS RHYTHM  T WAVE ABNORMALITY, CONSIDER ANTEROLATERAL ISCHEMIA  ABNORMAL ECG  NO PREVIOUS ECGS AVAILABLE  Confirmed by LOYDA OLIVEIRA MD ([id number]) on 3/4/2022 11:10:35 AM    < end of copied text >    
< from: 12 Lead ECG (03.07.22 @ 15:26) >      Ventricular Rate 63 BPM    Atrial Rate 63 BPM    P-R Interval 162 ms    QRS Duration 82 ms    Q-T Interval 404 ms    QTC Calculation(Bazett) 413 ms    P Axis 40 degrees    R Axis 16 degrees    T Axis 27 degrees    Diagnosis Line NORMAL SINUS RHYTHM  NORMAL ECG  WHEN COMPARED WITH ECG OF  3/3/22   NO SIGNIFICANT CHANGE WAS FOUND  Confirmed by LOYDA OLIVEIRA MD ([id number]) on 3/8/2022 9:47:40 PM    < end of copied text >    
< from: 12 Lead ECG (03.03.22 @ 10:50) >      Ventricular Rate 82 BPM    Atrial Rate 82 BPM    P-R Interval 146 ms    QRS Duration 84 ms    Q-T Interval 380 ms    QTC Calculation(Bazett) 443 ms    P Axis 42 degrees    R Axis 23 degrees    T Axis 17 degrees    Diagnosis Line *** POOR DATA QUALITY, INTERPRETATION MAY BE ADVERSELY AFFECTED  NORMAL SINUS RHYTHM  T WAVE ABNORMALITY, CONSIDER ANTEROLATERAL ISCHEMIA  ABNORMAL ECG  NO PREVIOUS ECGS AVAILABLE  Confirmed by LOYDA OLIVEIRA MD ([id number]) on 3/4/2022 11:10:35 AM    < end of copied text >

## 2022-03-10 NOTE — BH CONSULTATION LIAISON PROGRESS NOTE - NSBHCONSULTRECOMMENDOTHER_PSY_A_CORE FT
c/w Lamictal 125mg PO daily and 150mg PO qHS    Reduce Ativan to 0.25mg PO qAM, 0.5mg PO qafternoon, and 0.5mg PO qHS    Increase Seroquel to 225mg PO qhS     2PC to inpt psych when medically cleared

## 2022-03-10 NOTE — BH CONSULTATION LIAISON PROGRESS NOTE - CURRENT MEDICATION
MEDICATIONS  (STANDING):  apixaban 10 milliGRAM(s) Oral two times a day  aspirin enteric coated 81 milliGRAM(s) Oral daily  lamoTRIgine 125 milliGRAM(s) Oral daily  lamoTRIgine 150 milliGRAM(s) Oral at bedtime  levothyroxine 25 MICROGram(s) Oral daily  LORazepam     Tablet 0.5 milliGRAM(s) Oral three times a day  QUEtiapine 200 milliGRAM(s) Oral at bedtime  senna 2 Tablet(s) Oral at bedtime    MEDICATIONS  (PRN):  
MEDICATIONS  (STANDING):  aspirin enteric coated 81 milliGRAM(s) Oral daily  heparin   Injectable 5000 Unit(s) SubCutaneous every 8 hours  lamoTRIgine 125 milliGRAM(s) Oral daily  lamoTRIgine 150 milliGRAM(s) Oral at bedtime  LORazepam     Tablet 0.5 milliGRAM(s) Oral three times a day  QUEtiapine 200 milliGRAM(s) Oral at bedtime  sodium chloride 0.9%. 500 milliLiter(s) (50 mL/Hr) IV Continuous <Continuous>    MEDICATIONS  (PRN):  
MEDICATIONS  (STANDING):  aspirin enteric coated 81 milliGRAM(s) Oral daily  lamoTRIgine 125 milliGRAM(s) Oral daily  lamoTRIgine 150 milliGRAM(s) Oral at bedtime  levothyroxine 25 MICROGram(s) Oral daily  LORazepam     Tablet 0.5 milliGRAM(s) Oral three times a day  QUEtiapine 200 milliGRAM(s) Oral at bedtime    MEDICATIONS  (PRN):  
MEDICATIONS  (STANDING):  aspirin enteric coated 81 milliGRAM(s) Oral daily  heparin   Injectable 5000 Unit(s) SubCutaneous every 8 hours  lamoTRIgine 125 milliGRAM(s) Oral daily  lamoTRIgine 150 milliGRAM(s) Oral at bedtime  levothyroxine 25 MICROGram(s) Oral daily  LORazepam     Tablet 0.5 milliGRAM(s) Oral three times a day  QUEtiapine 200 milliGRAM(s) Oral at bedtime    MEDICATIONS  (PRN):  
MEDICATIONS  (STANDING):  aspirin enteric coated 81 milliGRAM(s) Oral daily  heparin   Injectable 5000 Unit(s) SubCutaneous every 8 hours  lamoTRIgine 125 milliGRAM(s) Oral daily  lamoTRIgine 150 milliGRAM(s) Oral at bedtime  potassium chloride    Tablet ER 40 milliEquivalent(s) Oral every 4 hours    MEDICATIONS  (PRN):

## 2022-03-10 NOTE — PROGRESS NOTE ADULT - REASON FOR ADMISSION
altered mental status

## 2022-03-10 NOTE — BH CONSULTATION LIAISON PROGRESS NOTE - NSBHATTESTSEENBY_PSY_A_CORE
attending Psychiatrist without NP/Trainee
Attending Psychiatrist supervising NP/Trainee, meeting pt...

## 2022-03-10 NOTE — PHYSICAL THERAPY INITIAL EVALUATION ADULT - PERTINENT HX OF CURRENT PROBLEM, REHAB EVAL
70 y/o F, divorces, who was admitted to [facility] on 3/2/22 due to "altered mental status." As per H&P, patient's PMH includes: "Anxiety and depression." CL Psych consulted due s/p suicide attempt in which patient reports to have ingested 90 Ativan pills. VA duplex: (-) DVT b/l LE's. CT angio chest 3/8: No pulmonary embolism. X ray chest and pelvis: No acute fracture or dislocation. CXR: Clear lungs. CT BRAIN: No acute intracranial bleeding. CT CERVICAL SPINE: No fracture. Multilevel spondylosis.

## 2022-03-10 NOTE — BH CONSULTATION LIAISON PROGRESS NOTE - NSBHASSESSMENTFT_PSY_ALL_CORE
71F , noncaregiver, retired , domiciled alone, with PPHx bipolar d/o, 2 psych admissions (1987, 1992), remote h/o SA via OD, no h/o substance abuse, with no significant PMH, a/w AMS, later disclosed to staff she had overdosed on 90 tabs Ativan as a suicide attempt, psychiatry consulted for suicidality.  Pt AOx4 on interview, reports several months of worsening depression culminating in SA via OD, left her journal and signed checks as part of her preparations.  States she took 90 tablets of ativan 0.5 mg, 2 tabs Seroquel 200mg and 2 tabs Lamictal 100mg.  Feels upset the SA was not successful, also endorsing persecutory delusions that the devil is after her, if bad things happen it is because of the devil.  Denies HI or substance abuse.

## 2022-03-10 NOTE — DISCHARGE NOTE NURSING/CASE MANAGEMENT/SOCIAL WORK - NSDCPEFALRISK_GEN_ALL_CORE
For information on Fall & Injury Prevention, visit: [email or web address] OR  [email or web address] OR  https://www.cdc.gov/steadi/patient.html

## 2022-03-10 NOTE — BH INPATIENT PSYCHIATRY ASSESSMENT NOTE - CURRENT MEDICATION
MEDICATIONS  (STANDING):  aspirin enteric coated 81 milliGRAM(s) Oral daily  influenza  Vaccine (HIGH DOSE) 0.7 milliLiter(s) IntraMuscular once  lamoTRIgine 150 milliGRAM(s) Oral at bedtime  lamoTRIgine 25 milliGRAM(s) Oral daily  levothyroxine 25 MICROGram(s) Oral daily  LORazepam     Tablet 0.25 milliGRAM(s) Oral daily  LORazepam     Tablet 0.5 milliGRAM(s) Oral <User Schedule>  QUEtiapine 200 milliGRAM(s) Oral at bedtime    MEDICATIONS  (PRN):   MEDICATIONS  (STANDING):  aspirin enteric coated 81 milliGRAM(s) Oral daily  influenza  Vaccine (HIGH DOSE) 0.7 milliLiter(s) IntraMuscular once  lamoTRIgine 125 milliGRAM(s) Oral daily  lamoTRIgine 150 milliGRAM(s) Oral at bedtime  levothyroxine 25 MICROGram(s) Oral daily  LORazepam     Tablet 0.25 milliGRAM(s) Oral daily  LORazepam     Tablet 0.5 milliGRAM(s) Oral <User Schedule>  QUEtiapine 200 milliGRAM(s) Oral at bedtime    MEDICATIONS  (PRN):

## 2022-03-10 NOTE — BH INPATIENT PSYCHIATRY ASSESSMENT NOTE - NSICDXBHPRIMARYDX_PSY_ALL_CORE
Bipolar 1 disorder, depressed, severe   F31.4   Bipolar 2 disorder, major depressive episode   F31.92

## 2022-03-11 LAB
ALBUMIN SERPL ELPH-MCNC: 3.7 G/DL — SIGNIFICANT CHANGE UP (ref 3.3–5)
ALP SERPL-CCNC: 79 U/L — SIGNIFICANT CHANGE UP (ref 40–120)
ALT FLD-CCNC: 83 U/L — HIGH (ref 4–33)
ANION GAP SERPL CALC-SCNC: 12 MMOL/L — SIGNIFICANT CHANGE UP (ref 7–14)
AST SERPL-CCNC: 59 U/L — HIGH (ref 4–32)
BASOPHILS # BLD AUTO: 0.02 K/UL — SIGNIFICANT CHANGE UP (ref 0–0.2)
BASOPHILS NFR BLD AUTO: 0.5 % — SIGNIFICANT CHANGE UP (ref 0–2)
BILIRUB SERPL-MCNC: 0.3 MG/DL — SIGNIFICANT CHANGE UP (ref 0.2–1.2)
BUN SERPL-MCNC: 7 MG/DL — SIGNIFICANT CHANGE UP (ref 7–23)
CALCIUM SERPL-MCNC: 9.4 MG/DL — SIGNIFICANT CHANGE UP (ref 8.4–10.5)
CHLORIDE SERPL-SCNC: 106 MMOL/L — SIGNIFICANT CHANGE UP (ref 98–107)
CO2 SERPL-SCNC: 24 MMOL/L — SIGNIFICANT CHANGE UP (ref 22–31)
CREAT SERPL-MCNC: 0.6 MG/DL — SIGNIFICANT CHANGE UP (ref 0.5–1.3)
EGFR: 97 ML/MIN/1.73M2 — SIGNIFICANT CHANGE UP
EOSINOPHIL # BLD AUTO: 0.07 K/UL — SIGNIFICANT CHANGE UP (ref 0–0.5)
EOSINOPHIL NFR BLD AUTO: 1.7 % — SIGNIFICANT CHANGE UP (ref 0–6)
GLUCOSE SERPL-MCNC: 112 MG/DL — HIGH (ref 70–99)
HCT VFR BLD CALC: 35.4 % — SIGNIFICANT CHANGE UP (ref 34.5–45)
HGB BLD-MCNC: 11.7 G/DL — SIGNIFICANT CHANGE UP (ref 11.5–15.5)
IANC: 2.31 K/UL — SIGNIFICANT CHANGE UP (ref 1.5–8.5)
IMM GRANULOCYTES NFR BLD AUTO: 1.2 % — SIGNIFICANT CHANGE UP (ref 0–1.5)
LYMPHOCYTES # BLD AUTO: 1.26 K/UL — SIGNIFICANT CHANGE UP (ref 1–3.3)
LYMPHOCYTES # BLD AUTO: 29.9 % — SIGNIFICANT CHANGE UP (ref 13–44)
MCHC RBC-ENTMCNC: 28.8 PG — SIGNIFICANT CHANGE UP (ref 27–34)
MCHC RBC-ENTMCNC: 33.1 GM/DL — SIGNIFICANT CHANGE UP (ref 32–36)
MCV RBC AUTO: 87.2 FL — SIGNIFICANT CHANGE UP (ref 80–100)
MONOCYTES # BLD AUTO: 0.51 K/UL — SIGNIFICANT CHANGE UP (ref 0–0.9)
MONOCYTES NFR BLD AUTO: 12.1 % — SIGNIFICANT CHANGE UP (ref 2–14)
NEUTROPHILS # BLD AUTO: 2.31 K/UL — SIGNIFICANT CHANGE UP (ref 1.8–7.4)
NEUTROPHILS NFR BLD AUTO: 54.6 % — SIGNIFICANT CHANGE UP (ref 43–77)
NRBC # BLD: 0 /100 WBCS — SIGNIFICANT CHANGE UP
NRBC # FLD: 0 K/UL — SIGNIFICANT CHANGE UP
PLATELET # BLD AUTO: 268 K/UL — SIGNIFICANT CHANGE UP (ref 150–400)
POTASSIUM SERPL-MCNC: 3.6 MMOL/L — SIGNIFICANT CHANGE UP (ref 3.5–5.3)
POTASSIUM SERPL-SCNC: 3.6 MMOL/L — SIGNIFICANT CHANGE UP (ref 3.5–5.3)
PROT SERPL-MCNC: 6 G/DL — SIGNIFICANT CHANGE UP (ref 6–8.3)
RBC # BLD: 4.06 M/UL — SIGNIFICANT CHANGE UP (ref 3.8–5.2)
RBC # FLD: 14.6 % — HIGH (ref 10.3–14.5)
SODIUM SERPL-SCNC: 142 MMOL/L — SIGNIFICANT CHANGE UP (ref 135–145)
WBC # BLD: 4.22 K/UL — SIGNIFICANT CHANGE UP (ref 3.8–10.5)
WBC # FLD AUTO: 4.22 K/UL — SIGNIFICANT CHANGE UP (ref 3.8–10.5)

## 2022-03-11 PROCEDURE — 93010 ELECTROCARDIOGRAM REPORT: CPT

## 2022-03-11 PROCEDURE — 99223 1ST HOSP IP/OBS HIGH 75: CPT

## 2022-03-11 PROCEDURE — 99232 SBSQ HOSP IP/OBS MODERATE 35: CPT

## 2022-03-11 RX ORDER — NICOTINE POLACRILEX 2 MG
2 GUM BUCCAL
Refills: 0 | Status: DISCONTINUED | OUTPATIENT
Start: 2022-03-11 | End: 2022-04-21

## 2022-03-11 RX ADMIN — Medication 0.25 MILLIGRAM(S): at 09:29

## 2022-03-11 RX ADMIN — Medication 25 MICROGRAM(S): at 09:29

## 2022-03-11 RX ADMIN — QUETIAPINE FUMARATE 200 MILLIGRAM(S): 200 TABLET, FILM COATED ORAL at 20:43

## 2022-03-11 RX ADMIN — Medication 0.5 MILLIGRAM(S): at 14:43

## 2022-03-11 RX ADMIN — Medication 0.5 MILLIGRAM(S): at 20:43

## 2022-03-11 RX ADMIN — Medication 2 MILLIGRAM(S): at 18:02

## 2022-03-11 RX ADMIN — LAMOTRIGINE 125 MILLIGRAM(S): 25 TABLET, ORALLY DISINTEGRATING ORAL at 09:29

## 2022-03-11 RX ADMIN — Medication 2 MILLIGRAM(S): at 15:46

## 2022-03-11 RX ADMIN — LAMOTRIGINE 150 MILLIGRAM(S): 25 TABLET, ORALLY DISINTEGRATING ORAL at 20:43

## 2022-03-11 NOTE — BH INPATIENT PSYCHIATRY PROGRESS NOTE - NSBHFUPINTERVALHXFT_PSY_A_CORE
Pt seen for f/u of depression and anxiety. VSS. Pt is compliant with medications, denies side effects. Pt continues to endorse low mood, anxiety and feeling dissatisfied with her life. Pt on board with ECT, consult placed for today. She will also need a dental consult.

## 2022-03-11 NOTE — CONSULT NOTE ADULT - ASSESSMENT
70 yo F w/ depression and anxiety, non-significant CAD, presenting with suicide attempt via intentional overdose, c/b fall with rib fx, s/p workup at [facility], now transferred to [place] for further psychiatric management.     Depression with need for ECT, pre-procedure evaluation:   ECT specific risk assessment: pt without history of increased ICP, aneurysm, active pulmonary disease, valvular disease, significant CAD, cerebral vascular disease.     Cardiovascular risk assessment: Patient evaluated using the revised cardiac risk index and is felt to be (***low, moderate) cardiovascular risk for a low cardiovascular risk procedure based on these criteria. The risk has been discussed with the patient and the patient wishes to proceed understanding that ECT is a low risk procedure.     Risk for complication is low. Patient is optimized for ECT treatment without further intervention and can proceed with treatment.    Further risk reduction will involve medical management of other comorbid conditions:    # CAD  - CT coronaries done at [facility] with non-significant CAD  - EKG w/o acute ischemic changes, echo w/ normal LV systolic function, no wall motion abnormalities  - c/w asa  - lipid profile noted, with CAC score 421, can discuss risks/benefits of initiating statin therapy  - consider DASH/TLC diet    # Rule out PE  - abnormality noted in CT coronaries scan raising concern for possible KARISHMA subsegmental PE, however no PE noted on dedicated CTA, no LE DVT noted on duplex  - pulm input appreciated, no need for anticoagulation at this time     # Fall w/ rib fx  - c/w symptomatic management of pain prn  - PT eval noted, no skilled PT needs    # Elevated TSH, ?hypothyroidism  - TSH 7, was started on synthroid at [facility] presumably for hypothyroidism, but no free t4 was obtained, consider checking free t4  - should followup with PMD as otpt to repeat TFTs    # Depression and anxiety, Suicide attempt via intentional overdose  - safety precautions and medication management as per psych  - on seroquel, lamictal, ativan as per psych 72 yo F w/ depression and anxiety, non-significant CAD, presenting with suicide attempt via intentional overdose, c/b fall with rib fx, s/p workup at [facility], now transferred to [facility] for further psychiatric management.     Depression with need for ECT, pre-procedure evaluation:   ECT specific risk assessment: pt without history of increased ICP, aneurysm, active pulmonary disease, valvular disease, significant CAD, cerebral vascular disease.     Cardiovascular risk assessment: Patient evaluated using the revised cardiac risk index and is felt to be (***low, moderate) cardiovascular risk for a low cardiovascular risk procedure based on these criteria. The risk has been discussed with the patient and the patient wishes to proceed understanding that ECT is a low risk procedure.     Risk for complication is low. Patient is optimized for ECT treatment without further intervention and can proceed with treatment.    Further risk reduction will involve medical management of other comorbid conditions:    # CAD  - CT coronaries done at [facility] with non-significant CAD  - EKG w/o acute ischemic changes, echo w/ normal LV systolic function, no wall motion abnormalities  - c/w asa  - lipid profile noted, with CAC score 421, can discuss risks/benefits of initiating statin therapy, though can hold off for now given mild transaminitis on labs  - consider DASH/TLC diet    # Rule out PE  - abnormality noted in CT coronaries scan raising concern for possible KARISHMA subsegmental PE, however no PE noted on dedicated CTA, no LE DVT noted on duplex  - pulm input appreciated, no need for anticoagulation at this time     # Fall w/ rib fx  - c/w symptomatic management of pain prn  - PT eval noted, no skilled PT needs    # Elevated TSH, ?hypothyroidism  - TSH 7, was started on synthroid at [facility] presumably for hypothyroidism, but no free t4 was obtained, consider checking free t4  - should followup with PMD as otpt to repeat TFTs    # Depression and anxiety, Suicide attempt via intentional overdose  - safety precautions and medication management as per psych  - on seroquel, lamictal, ativan as per psych 72 yo F w/ depression and anxiety, non-significant CAD, presenting with suicide attempt via intentional overdose, c/b fall with rib fx, s/p workup at [facility], now transferred to [facility] for further psychiatric management.     Depression with need for ECT, pre-procedure evaluation:   ECT specific risk assessment: pt without history of increased ICP, aneurysm, active pulmonary disease, valvular disease, significant CAD, cerebral vascular disease.     Please see pre-op section above for details of assessment.     Cardiovascular risk assessment: Patient evaluated using the revised cardiac risk index and is felt to be low cardiovascular risk for a low cardiovascular risk procedure based on these criteria. The risk has been discussed with the patient and the patient wishes to proceed understanding that ECT is a low risk procedure.     Risk for complication is low. Patient is optimized for ECT treatment without further intervention and can proceed with treatment.    Patient is awaiting dental evaluation prior to procedure.     Further risk reduction will involve medical management of other comorbid conditions:    # CAD (non-obstructive)  - CT coronaries done at [facility] with non-significant CAD  - EKG w/o acute ischemic changes, echo w/ normal LV systolic function, no wall motion abnormalities  - c/w asa  - lipid profile noted, with CAC score 421, can discuss risks/benefits of re-initiating statin therapy, though can hold off for now given mild transaminitis on labs, patient appears to have been on zocor in the past (via meds from other sources function, script for simvastatin 20 mg 90 day supply filled in January)  - consider DASH/TLC diet    # Rule out PE  - abnormality noted in CT coronaries scan raising concern for possible KARISHMA subsegmental PE, however no PE noted on dedicated CTA, no LE DVT noted on duplex  - pulm input appreciated, no need for anticoagulation at this time     # Fall w/ rib fx  - c/w symptomatic management of pain prn  - PT eval noted, no skilled PT needs    # Elevated TSH, ?hypothyroidism  - TSH 7, was started on synthroid at [facility] presumably for hypothyroidism, but no free t4 was obtained, consider checking free t4  - should followup with PMD as otpt to repeat TFTs    # Depression and anxiety, Suicide attempt via intentional overdose  - safety precautions and medication management as per psych  - on seroquel, lamictal, ativan as per psych  - plan for ECT as per psych    # Nicotine use  - reports e-cigarrette (vaping) use, with cravings currently  - can offer nicotine replacement therapy    # Constipation  - can do trial of senna, monitor BMs  - patient declines use of miralax at this time

## 2022-03-11 NOTE — PSYCHIATRIC REHAB INITIAL EVALUATION - NSBHSTRENGTHHOME_PSY_ALL_CORE
Patient stated at her baseline she is able to attend to her ADL's and IADL's. Patient is able to ambulate independently.

## 2022-03-11 NOTE — ECT CONSULT NOTE - CURRENT MEDICATION
MEDICATIONS  (STANDING):  aspirin enteric coated 81 milliGRAM(s) Oral daily  influenza  Vaccine (HIGH DOSE) 0.7 milliLiter(s) IntraMuscular once  lamoTRIgine 150 milliGRAM(s) Oral at bedtime  lamoTRIgine 125 milliGRAM(s) Oral daily  levothyroxine 25 MICROGram(s) Oral daily  LORazepam     Tablet 0.25 milliGRAM(s) Oral daily  LORazepam     Tablet 0.5 milliGRAM(s) Oral <User Schedule>  QUEtiapine 200 milliGRAM(s) Oral at bedtime    MEDICATIONS  (PRN):  nicotine  Polacrilex Gum 2 milliGRAM(s) Oral every 2 hours PRN nicotine craving

## 2022-03-11 NOTE — ECT CONSULT NOTE - NSECTMENTALSTATUSEXAM_PSY_ALL_CORE
Conscious, cooperative, alert.   No psychomotor agitation/retardation.   Good eye contact.   Speech: regular rate and rhythm,   Mood: Depressed for most part of the interview, but later was anxious and irritable over making sure that staff had saved her dinner. Affect: appropriate, full range.   Thought Process: linear, goal directed   Thought Content: Passive death wish present. Denies active Suicidal ideation/intent/plan, No homicidal ideation/intent/plan. No delusions established. Some paranoia towards staff/feeling of devil is out to get her-present.   Perception: No hallucinations   Insight and Judgement: fair  Impulse Control: fair at this time.

## 2022-03-11 NOTE — ECT CONSULT NOTE - NSECTASSESSRECOMM_PSY_ALL_CORE
A course of ECT is indicated for the severe Bipolar II depression that the patient is suffering from. She has a h/o good response to ECT in past. Risk vs benefits were discussed at length including but not limited to the risk of memory side-effects, switch to ahsan, various ECT placements and rare side-effects. Patient was provided a copy of consent early on and she had read it.     Patient signed informed consent for Bifrontal ECT.  She will need dental consultation. The Lamictal dose will need to be lowered. Findings discussed with Dr. Wolf. Dr. Wolf's team was also advised to perform a formal cognitive test like MMSE/MOCA.

## 2022-03-11 NOTE — ECT CONSULT NOTE - DESCRIPTION
Hyperlipidemia, Urinary retention Hyperlipidemia, Non-obstructive CAD on recent CT coronaries (2022), Urinary retention,

## 2022-03-11 NOTE — BH SOCIAL WORK INITIAL PSYCHOSOCIAL EVALUATION - OTHER PAST PSYCHIATRIC HISTORY (INCLUDE DETAILS REGARDING ONSET, COURSE OF ILLNESS, INPATIENT/OUTPATIENT TREATMENT)
Pt with history of at least two prior psychiatric admissions (1987, 1992), beneficial ECT treatments, and longstanding care (35 years) with Dr. Kong.  Pt currently followed by [facility], but reports not wanting to continue there after discharge. Pt with history of at least two prior psychiatric admissions (1987, 1992), beneficial ECT treatments, and longstanding care (35 years) with Dr. Kong [phone].  Pt currently followed by [facility], but reports not wanting to continue there after discharge.
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
## 2022-03-11 NOTE — CONSULT NOTE ADULT - SUBJECTIVE AND OBJECTIVE BOX
HPI:     PAST MEDICAL & SURGICAL HISTORY:  Anxiety and depression    No significant past surgical history    Review of Systems:   CONSTITUTIONAL:   EYES:   ENMT:    NECK:   RESPIRATORY:   CARDIOVASCULAR:   GASTROINTESTINAL:   GENITOURINARY:   NEUROLOGICAL:   SKIN:   LYMPH NODES:  ENDOCRINE:   MUSCULOSKELETAL:   HEME/LYMPH:   ALLERGY AND IMMUNOLOGIC:     Allergies  sulfa drugs (Unknown)    Intolerances    Social History:     FAMILY HISTORY:    MEDICATIONS  (STANDING):  aspirin enteric coated 81 milliGRAM(s) Oral daily  influenza  Vaccine (HIGH DOSE) 0.7 milliLiter(s) IntraMuscular once  lamoTRIgine 125 milliGRAM(s) Oral daily  lamoTRIgine 150 milliGRAM(s) Oral at bedtime  levothyroxine 25 MICROGram(s) Oral daily  LORazepam     Tablet 0.25 milliGRAM(s) Oral daily  LORazepam     Tablet 0.5 milliGRAM(s) Oral <User Schedule>  QUEtiapine 200 milliGRAM(s) Oral at bedtime    MEDICATIONS  (PRN):      Vital Signs Last 24 Hrs  T(C): 36.9 (11 Mar 2022 08:00), Max: 37 (10 Mar 2022 15:37)  T(F): 98.5 (11 Mar 2022 08:00), Max: 98.6 (10 Mar 2022 15:37)  HR: 85 (10 Mar 2022 14:44) (85 - 85)  BP: 151/78 (10 Mar 2022 14:44) (151/78 - 151/78)  RR: 18 (10 Mar 2022 15:37) (18 - 18)  SpO2: 98% (10 Mar 2022 15:37) (97% - 98%)  CAPILLARY BLOOD GLUCOSE      PHYSICAL EXAM:  GENERAL:   HEAD:    EYES:  ENMT:  NECK:   CHEST/LUNG:   HEART:   ABDOMEN:   EXTREMITIES:    PSYCH:   NEUROLOGY:   SKIN:     LABS:                        11.7   4.22  )-----------( 268      ( 11 Mar 2022 10:01 )             35.4       Basic Metabolic Panel in AM (03.09.22 @ 07:09)   Sodium, Serum: 142 mmol/L   Potassium, Serum: 3.6 mmol/L   Chloride, Serum: 108 mmol/L   Carbon Dioxide, Serum: 27 mmol/L   Anion Gap, Serum: 7 mmol/L   Blood Urea Nitrogen, Serum: 8 mg/dL   Creatinine, Serum: 0.68 mg/dL   Glucose, Serum: 105 mg/dL   Calcium, Total Serum: 9.0 mg/dL     Lipid Profile (03.05.22 @ 10:39)   Cholesterol, Serum: 148 mg/dL   Triglycerides, Serum: 98 mg/dL   HDL Cholesterol, Serum: 45 mg/dL   Non HDL Cholesterol: 103  LDL Cholesterol Calculated: 83 mg/dL   Thyroid Stimulating Hormone, Serum: 7.00 uIU/mL (03.05.22 @ 10:48)   A1C with Estimated Average Glucose Result: 4.8:    EKG(personally reviewed): Sinus 63 bpm qtc 413 ms 3/7    RADIOLOGY & ADDITIONAL TESTS:    Imaging Personally Reviewed:  `< from: Xray Chest 1 View- PORTABLE-Urgent (Xray Chest 1 View- PORTABLE-Urgent .) (03.02.22 @ 20:40) >  IMPRESSION:    Clear lungs.    No acute displaced fracture is seen.    < end of copied text >  < from: Xray Pelvis AP only (03.02.22 @ 20:40) >  IMPRESSION:    No acute displaced pelvic fracture.    < end of copied text >  < from: Xray Shoulder 2 Views, Left (03.02.22 @ 20:41) >  IMPRESSION:    No acute fracture or dislocation.    < end of copied text >  < from: CT Cervical Spine No Cont (03.02.22 @ 20:58) >  IMPRESSION:    CT BRAIN: No acute intracranial bleeding.    CT CERVICAL SPINE: No fracture. Multilevel spondylosis.    < end of copied text >  < from: CT Angio Head w/ IV Cont (03.02.22 @ 20:58) >    IMPRESSION:    CTA BRAIN: Patent intracranial circulation. No flow-limiting stenosis or   occlusion.    CTA NECK: Patent cervical vasculature. No flow limiting stenosis or   occlusion.    < end of copied text >  < from: CT Angio Heart and Coronaries w/ IV Cont (03.07.22 @ 14:35) >  IMPRESSION:  1.  Coronary artery disease:  LM:  minimal noncalcified plaque  LAD:  minimal (<30%) calcified plaque at the ostium  minimal (<30%) noncalcified and calcified plaque in the proximal segment  mild (30-49%) predominantly calcified plaque in the mid segment  minimal (<30%) noncalcified and calcified plaque in the distal segment  LCX:  moderate (approximately 50%) mixed noncalcified and calcified plaque   extending from the ostium to the proximal segment  mild (30-49%) noncalcified and calcified plaque in the distal segment  mild (30-49%) noncalcified and calcified plaque in the OM branch  RCA:  mild (30-49%) noncalcified and calcified plaque in the proximal segment  minimal (<30%) noncalcified plaque in the PDA and RPL branch  2.  Severe coronary artery calcium (Agatston score 421) as delineated   above.  3. Left upper lobe subsegmental pulmonary arterial embolus seen within   the partially imaged lungs. A dedicated CT pulmonary angiogram is   recommended for complete evaluation.  4. Acute appearing fractures of the right fifth and sixth ribs.    < end of copied text >  < from: CT Angio Chest PE Protocol w/ IV Cont (03.08.22 @ 13:52) >    IMPRESSION:    No pulmonary embolism.    < end of copied text >  < from: [facility] Duplex Lower Ext Vein Scan, Bilat (03.09.22 @ 15:01) >  IMPRESSION:  No evidence of deep venous thrombosis in either lower extremity.    < end of copied text >    < from: TTE with Doppler (w/Cont) (03.04.22 @ 11:00) >  Conclusions:  1. Aortic Root: 2.7cm.  Sinotubular Junction: 3 cm.  Ascending Aorta: 3.4 cm.  LVOT diameter: 2.1 cm.  2. Normal left ventricular internal dimensions and wall  thickness.  3. Normal left ventricular systolic function.   Endocardial  visualization enhanced with intravenous injection of  Ultrasonic Enhancing Agent (Definity).  4. Pericardial fat pad noted.   Trace pericardial effusion.  *** No previous Echo exam.    < end of copied text >      Consultant(s) Notes Reviewed:  [facility] hospital course, BH, Cards, pulmonary evaluation    Care Discussed with Consultants/Other Providers:   HPI:     PAST MEDICAL & SURGICAL HISTORY:  Anxiety and depression    No significant past surgical history    Review of Systems:   CONSTITUTIONAL:   EYES:   ENMT:    NECK:   RESPIRATORY:   CARDIOVASCULAR:   GASTROINTESTINAL:   GENITOURINARY:   NEUROLOGICAL:   SKIN:   LYMPH NODES:  ENDOCRINE:   MUSCULOSKELETAL:   HEME/LYMPH:   ALLERGY AND IMMUNOLOGIC:     Allergies  sulfa drugs (Unknown)    Intolerances    Social History:   , lives alone, retired , brother is source of support, vaping, denies other toxic habits, is prescribed psychiatric medications     FAMILY HISTORY:  Maternal Uncle - depression  Niece - schizophrenic    MEDICATIONS  (STANDING):  aspirin enteric coated 81 milliGRAM(s) Oral daily  influenza  Vaccine (HIGH DOSE) 0.7 milliLiter(s) IntraMuscular once  lamoTRIgine 125 milliGRAM(s) Oral daily  lamoTRIgine 150 milliGRAM(s) Oral at bedtime  levothyroxine 25 MICROGram(s) Oral daily  LORazepam     Tablet 0.25 milliGRAM(s) Oral daily  LORazepam     Tablet 0.5 milliGRAM(s) Oral <User Schedule>  QUEtiapine 200 milliGRAM(s) Oral at bedtime    MEDICATIONS  (PRN):      Vital Signs Last 24 Hrs  T(C): 36.9 (11 Mar 2022 08:00), Max: 37 (10 Mar 2022 15:37)  T(F): 98.5 (11 Mar 2022 08:00), Max: 98.6 (10 Mar 2022 15:37)  HR: 85 (10 Mar 2022 14:44) (85 - 85)  BP: 151/78 (10 Mar 2022 14:44) (151/78 - 151/78)  /69 3/11 @ 914AM  RR: 18 (10 Mar 2022 15:37) (18 - 18)  SpO2: 98% (10 Mar 2022 15:37) (97% - 98%)  CAPILLARY BLOOD GLUCOSE      PHYSICAL EXAM:  GENERAL:   HEAD:    EYES:  ENMT:  NECK:   CHEST/LUNG:   HEART:   ABDOMEN:   EXTREMITIES:    PSYCH:   NEUROLOGY:   SKIN:     LABS:                        11.7   4.22  )-----------( 268      ( 11 Mar 2022 10:01 )             35.4       Basic Metabolic Panel in AM (03.09.22 @ 07:09)   Sodium, Serum: 142 mmol/L   Potassium, Serum: 3.6 mmol/L   Chloride, Serum: 108 mmol/L   Carbon Dioxide, Serum: 27 mmol/L   Anion Gap, Serum: 7 mmol/L   Blood Urea Nitrogen, Serum: 8 mg/dL   Creatinine, Serum: 0.68 mg/dL   Glucose, Serum: 105 mg/dL   Calcium, Total Serum: 9.0 mg/dL     Lipid Profile (03.05.22 @ 10:39)   Cholesterol, Serum: 148 mg/dL   Triglycerides, Serum: 98 mg/dL   HDL Cholesterol, Serum: 45 mg/dL   Non HDL Cholesterol: 103  LDL Cholesterol Calculated: 83 mg/dL   Thyroid Stimulating Hormone, Serum: 7.00 uIU/mL (03.05.22 @ 10:48)   A1C with Estimated Average Glucose Result: 4.8:    EKG(personally reviewed): Sinus 63 bpm qtc 413 ms 3/7    RADIOLOGY & ADDITIONAL TESTS:    Imaging Personally Reviewed:  `< from: Xray Chest 1 View- PORTABLE-Urgent (Xray Chest 1 View- PORTABLE-Urgent .) (03.02.22 @ 20:40) >  IMPRESSION:    Clear lungs.    No acute displaced fracture is seen.    < end of copied text >  < from: Xray Pelvis AP only (03.02.22 @ 20:40) >  IMPRESSION:    No acute displaced pelvic fracture.    < end of copied text >  < from: Xray Shoulder 2 Views, Left (03.02.22 @ 20:41) >  IMPRESSION:    No acute fracture or dislocation.    < end of copied text >  < from: CT Cervical Spine No Cont (03.02.22 @ 20:58) >  IMPRESSION:    CT BRAIN: No acute intracranial bleeding.    CT CERVICAL SPINE: No fracture. Multilevel spondylosis.    < end of copied text >  < from: CT Angio Head w/ IV Cont (03.02.22 @ 20:58) >    IMPRESSION:    CTA BRAIN: Patent intracranial circulation. No flow-limiting stenosis or   occlusion.    CTA NECK: Patent cervical vasculature. No flow limiting stenosis or   occlusion.    < end of copied text >  < from: CT Angio Heart and Coronaries w/ IV Cont (03.07.22 @ 14:35) >  IMPRESSION:  1.  Coronary artery disease:  LM:  minimal noncalcified plaque  LAD:  minimal (<30%) calcified plaque at the ostium  minimal (<30%) noncalcified and calcified plaque in the proximal segment  mild (30-49%) predominantly calcified plaque in the mid segment  minimal (<30%) noncalcified and calcified plaque in the distal segment  LCX:  moderate (approximately 50%) mixed noncalcified and calcified plaque   extending from the ostium to the proximal segment  mild (30-49%) noncalcified and calcified plaque in the distal segment  mild (30-49%) noncalcified and calcified plaque in the OM branch  RCA:  mild (30-49%) noncalcified and calcified plaque in the proximal segment  minimal (<30%) noncalcified plaque in the PDA and RPL branch  2.  Severe coronary artery calcium (Agatston score 421) as delineated   above.  3. Left upper lobe subsegmental pulmonary arterial embolus seen within   the partially imaged lungs. A dedicated CT pulmonary angiogram is   recommended for complete evaluation.  4. Acute appearing fractures of the right fifth and sixth ribs.    < end of copied text >  < from: CT Angio Chest PE Protocol w/ IV Cont (03.08.22 @ 13:52) >    IMPRESSION:    No pulmonary embolism.    < end of copied text >  < from: VA Duplex Lower Ext Vein Scan, Bilat (03.09.22 @ 15:01) >  IMPRESSION:  No evidence of deep venous thrombosis in either lower extremity.    < end of copied text >    < from: TTE with Doppler (w/Cont) (03.04.22 @ 11:00) >  Conclusions:  1. Aortic Root: 2.7cm.  Sinotubular Junction: 3 cm.  Ascending Aorta: 3.4 cm.  LVOT diameter: 2.1 cm.  2. Normal left ventricular internal dimensions and wall  thickness.  3. Normal left ventricular systolic function.   Endocardial  visualization enhanced with intravenous injection of  Ultrasonic Enhancing Agent (Definity).  4. Pericardial fat pad noted.   Trace pericardial effusion.  *** No previous Echo exam.    < end of copied text >      Consultant(s) Notes Reviewed:  [facility] hospital course, BH, Cards, pulmonary evaluation    Care Discussed with Consultants/Other Providers:   HPI: 70 yo F w/ anxiety and depression with prior hx of suicide attempts (via OD) and psychiatric hospitalizations (1987, 1992) initially presenting to [facility] following suicide attempt via OD on 90 tabs of ativan, s/p medical evaluation, now transferred to [facility] for further psychiatric management. Patient reporting worsening symptoms of depression in setting of her long standing psychiatrist retiring and her close friend moving to [place], experiencing anhedonia, depressed mood, hopelessness. While at [facility], she was noted to have an abnormal initial EKG, and was evaluated by cardiology, echo showed normal LV systolic function and CT coronaries showed non-obstructive CAD, however raised concern for possible incidentally found KARISHMA subsegmental PE and R 5th and 6th rib fractures. Dedicated CTA was negative for PE, and LE duplex was negative for DVT, patient evaluated by pulm who felt no need for anticoagulation given negative results.     Currently patient...     Pre-op evaluation:      PAST MEDICAL & SURGICAL HISTORY:  Anxiety and depression  non-obstructive CAD noted on CT coronaries 2022    No significant past surgical history    Review of Systems:   CONSTITUTIONAL:   EYES:   ENMT:    NECK:   RESPIRATORY:   CARDIOVASCULAR:   GASTROINTESTINAL:   GENITOURINARY:   NEUROLOGICAL:   SKIN:   LYMPH NODES:  ENDOCRINE:   MUSCULOSKELETAL:   HEME/LYMPH:   ALLERGY AND IMMUNOLOGIC:     Allergies  sulfa drugs (Unknown)    Intolerances    Social History:   , lives alone, retired , brother is source of support, vaping, denies other toxic habits, is prescribed psychiatric medications     FAMILY HISTORY:  Maternal Uncle - depression  Niece - schizophrenic    MEDICATIONS  (STANDING):  aspirin enteric coated 81 milliGRAM(s) Oral daily  influenza  Vaccine (HIGH DOSE) 0.7 milliLiter(s) IntraMuscular once  lamoTRIgine 125 milliGRAM(s) Oral daily  lamoTRIgine 150 milliGRAM(s) Oral at bedtime  levothyroxine 25 MICROGram(s) Oral daily  LORazepam     Tablet 0.25 milliGRAM(s) Oral daily  LORazepam     Tablet 0.5 milliGRAM(s) Oral <User Schedule>  QUEtiapine 200 milliGRAM(s) Oral at bedtime    MEDICATIONS  (PRN):      Vital Signs Last 24 Hrs  T(C): 36.9 (11 Mar 2022 08:00), Max: 37 (10 Mar 2022 15:37)  T(F): 98.5 (11 Mar 2022 08:00), Max: 98.6 (10 Mar 2022 15:37)  HR: 85 (10 Mar 2022 14:44) (85 - 85)  BP: 151/78 (10 Mar 2022 14:44) (151/78 - 151/78)  /69 3/11 @ 914AM  RR: 18 (10 Mar 2022 15:37) (18 - 18)  SpO2: 98% (10 Mar 2022 15:37) (97% - 98%)  CAPILLARY BLOOD GLUCOSE      PHYSICAL EXAM:  GENERAL:   HEAD:    EYES:  ENMT:  NECK:   CHEST/LUNG:   HEART:   ABDOMEN:   EXTREMITIES:    PSYCH:   NEUROLOGY:   SKIN:     LABS:                        11.7   4.22  )-----------( 268      ( 11 Mar 2022 10:01 )             35.4     03-11    142  |  106  |  7   ----------------------------<  112<H>  3.6   |  24  |  0.60    Ca    9.4      11 Mar 2022 10:01    TPro  6.0  /  Alb  3.7  /  TBili  0.3  /  DBili  x   /  AST  59<H>  /  ALT  83<H>  /  AlkPhos  79  03-11  Lipid Profile (03.05.22 @ 10:39)   Cholesterol, Serum: 148 mg/dL   Triglycerides, Serum: 98 mg/dL   HDL Cholesterol, Serum: 45 mg/dL   Non HDL Cholesterol: 103  LDL Cholesterol Calculated: 83 mg/dL   Thyroid Stimulating Hormone, Serum: 7.00 uIU/mL (03.05.22 @ 10:48)   A1C with Estimated Average Glucose Result: 4.8:    EKG(personally reviewed): Sinus 63 bpm qtc 413 ms 3/7    RADIOLOGY & ADDITIONAL TESTS:    Imaging Personally Reviewed:  `< from: Xray Chest 1 View- PORTABLE-Urgent (Xray Chest 1 View- PORTABLE-Urgent .) (03.02.22 @ 20:40) >  IMPRESSION:    Clear lungs.    No acute displaced fracture is seen.    < end of copied text >  < from: Xray Pelvis AP only (03.02.22 @ 20:40) >  IMPRESSION:    No acute displaced pelvic fracture.    < end of copied text >  < from: Xray Shoulder 2 Views, Left (03.02.22 @ 20:41) >  IMPRESSION:    No acute fracture or dislocation.    < end of copied text >  < from: CT Cervical Spine No Cont (03.02.22 @ 20:58) >  IMPRESSION:    CT BRAIN: No acute intracranial bleeding.    CT CERVICAL SPINE: No fracture. Multilevel spondylosis.    < end of copied text >  < from: CT Angio Head w/ IV Cont (03.02.22 @ 20:58) >    IMPRESSION:    CTA BRAIN: Patent intracranial circulation. No flow-limiting stenosis or   occlusion.    CTA NECK: Patent cervical vasculature. No flow limiting stenosis or   occlusion.    < end of copied text >  < from: CT Angio Heart and Coronaries w/ IV Cont (03.07.22 @ 14:35) >  IMPRESSION:  1.  Coronary artery disease:  LM:  minimal noncalcified plaque  LAD:  minimal (<30%) calcified plaque at the ostium  minimal (<30%) noncalcified and calcified plaque in the proximal segment  mild (30-49%) predominantly calcified plaque in the mid segment  minimal (<30%) noncalcified and calcified plaque in the distal segment  LCX:  moderate (approximately 50%) mixed noncalcified and calcified plaque   extending from the ostium to the proximal segment  mild (30-49%) noncalcified and calcified plaque in the distal segment  mild (30-49%) noncalcified and calcified plaque in the OM branch  RCA:  mild (30-49%) noncalcified and calcified plaque in the proximal segment  minimal (<30%) noncalcified plaque in the PDA and RPL branch  2.  Severe coronary artery calcium (Agatston score 421) as delineated   above.  3. Left upper lobe subsegmental pulmonary arterial embolus seen within   the partially imaged lungs. A dedicated CT pulmonary angiogram is   recommended for complete evaluation.  4. Acute appearing fractures of the right fifth and sixth ribs.    < end of copied text >  < from: CT Angio Chest PE Protocol w/ IV Cont (03.08.22 @ 13:52) >    IMPRESSION:    No pulmonary embolism.    < end of copied text >  < from: VA Duplex Lower Ext Vein Scan, Bilat (03.09.22 @ 15:01) >  IMPRESSION:  No evidence of deep venous thrombosis in either lower extremity.    < end of copied text >    < from: TTE with Doppler (w/Cont) (03.04.22 @ 11:00) >  Conclusions:  1. Aortic Root: 2.7cm.  Sinotubular Junction: 3 cm.  Ascending Aorta: 3.4 cm.  LVOT diameter: 2.1 cm.  2. Normal left ventricular internal dimensions and wall  thickness.  3. Normal left ventricular systolic function.   Endocardial  visualization enhanced with intravenous injection of  Ultrasonic Enhancing Agent (Definity).  4. Pericardial fat pad noted.   Trace pericardial effusion.  *** No previous Echo exam.    < end of copied text >      Consultant(s) Notes Reviewed:  [facility] hospital course, BH, Cards, pulmonary evaluation    Care Discussed with Consultants/Other Providers:   HPI: 70 yo F w/ anxiety and depression with prior hx of suicide attempts (via OD) and psychiatric hospitalizations (1987, 1992) initially presenting to [facility] following suicide attempt via OD on 90 tabs of ativan, s/p medical evaluation, now transferred to [facility] for further psychiatric management. Patient reporting worsening symptoms of depression in setting of her long standing psychiatrist retiring and her close friend moving to [place], experiencing anhedonia, depressed mood, hopelessness. While at [facility], she was noted to have an abnormal initial EKG, and was evaluated by cardiology, echo showed normal LV systolic function and CT coronaries showed non-obstructive CAD, however raised concern for possible incidentally found KARISHMA subsegmental PE and R 5th and 6th rib fractures. Dedicated CTA was negative for PE, and LE duplex was negative for DVT, patient evaluated by pulm who felt no need for anticoagulation given negative results.     Currently patient reports feeling anxious, and a bit overwhelmed with noise and "chaos" from the other patients. Reviewed workup and findings from [facility] hospitalization, patient able to demonstrate understanding (no blood clots, cardiac function wnl, with non-obstructive CAD). Discussed TFTs, thyroid physiology and recommendation for otpt PMD followup, patient taking notes on results (but occasionally has to stop because is "too much" to handle). Complains of some constipation (would like senna, doesn't think miralax effective), and cravings since they took away her vape pen (amenable to nicotine replacement therapy). Is ambivalent about asa (discussed cardiology recs) and reports being on statin in the past (chart review meds from other sources reveals prior script for zocor 20 mg filled January 2022).      Pre-op evaluation:    In terms of pre-op evaluation, patient noted to have non-obstructive CAD on recent CT coronaries, however denies active chest pain, and EKG 3/7 w/o acute ischemic changes, echo 3/4 w/ normal LV systolic function, she has no history of CHF, denies SOB/FISCHER and has no signs of hypervolemia on exam. She reports being able to do 4 METS of activity. She denies prior history of CVA, she is not diabetic on long term insulin therapy, and her pre-op Cr is <2.     Overall patient is a low risk patient for a low risk procedure and is able to proceed w/ ECT without the need for further cardiac workup.   She reports the presence of a dental bridge for which she is planned for dental evaluation.     PAST MEDICAL & SURGICAL HISTORY:  Anxiety and depression  non-obstructive CAD noted on CT coronaries 2022    No significant past surgical history    Review of Systems:   CONSTITUTIONAL: no fever/chills  EYES: no eye pain/blurry vision  ENMT:  no sore throat/trouble swallowing  NECK: no neck pain/stiffness  RESPIRATORY: no cough/SOB  CARDIOVASCULAR: no chest pain/palpitations/LE edema/FISCHER  GASTROINTESTINAL: no N/V/abdominal pain, +constipation  GENITOURINARY: no dysuria or urinary problems  NEUROLOGICAL: no headaches/focal weakness/numbness  PSYCH: +depression, +anxiety, feeling of being "overwhelmed", +cravings for e-cigarette/vaping  SKIN: no rash lesions  ENDOCRINE: no diabetes, denies prior history of thyroid issues  MUSCULOSKELETAL: no joint pain/swelling  HEME/LYMPH: no easy bleding/brusing  ALLERGY AND IMMUNOLOGIC: rxn to sulfa    Allergies  sulfa drugs (Unknown)    Intolerances    Social History:   , lives alone, retired , brother is source of support, vaping (e-cigarette), denies other toxic habits, is prescribed psychiatric medications     FAMILY HISTORY:  Maternal Uncle - depression  Niece - schizophrenic    MEDICATIONS  (STANDING):  aspirin enteric coated 81 milliGRAM(s) Oral daily  influenza  Vaccine (HIGH DOSE) 0.7 milliLiter(s) IntraMuscular once  lamoTRIgine 125 milliGRAM(s) Oral daily  lamoTRIgine 150 milliGRAM(s) Oral at bedtime  levothyroxine 25 MICROGram(s) Oral daily  LORazepam     Tablet 0.25 milliGRAM(s) Oral daily  LORazepam     Tablet 0.5 milliGRAM(s) Oral <User Schedule>  QUEtiapine 200 milliGRAM(s) Oral at bedtime    MEDICATIONS  (PRN):    Vital Signs Last 24 Hrs  T(C): 36.9 (11 Mar 2022 08:00), Max: 37 (10 Mar 2022 15:37)  T(F): 98.5 (11 Mar 2022 08:00), Max: 98.6 (10 Mar 2022 15:37)  HR: 85 (10 Mar 2022 14:44) (85 - 85)  BP: 151/78 (10 Mar 2022 14:44) (151/78 - 151/78)  /69 3/11 @ 914AM  RR: 18 (10 Mar 2022 15:37) (18 - 18)  SpO2: 98% (10 Mar 2022 15:37) (97% - 98%)  CAPILLARY BLOOD GLUCOSE      PHYSICAL EXAM:  GENERAL: NAD, able to independently ambulate around unit, goes to sit in chair for interview/exam  HEAD:  NC/AT  EYES: EOMI, clear sclera/conjunctiva  ENMT: MMM, hearing intact to voice  NECK: supple, no JVD  CHEST/LUNG: CTAB, no wheeze appreciated, comfortable on RA, speaking in full sentences  HEART: S1S2 RRR  ABDOMEN: soft, non-tender +BS  EXTREMITIES:  no c/c/e  PSYCH: slightly anxious appearing, gets overwhelmed at times, snaps at provider then apologizes, cooperative with exam  NEUROLOGY: non-focal, moving all extremities, speech fluent, SILT grossly  SKIN: no obvious rash/lesions    LABS:                        11.7   4.22  )-----------( 268      ( 11 Mar 2022 10:01 )             35.4     03-11    142  |  106  |  7   ----------------------------<  112<H>  3.6   |  24  |  0.60    Ca    9.4      11 Mar 2022 10:01    TPro  6.0  /  Alb  3.7  /  TBili  0.3  /  DBili  x   /  AST  59<H>  /  ALT  83<H>  /  AlkPhos  79  03-11  Lipid Profile (03.05.22 @ 10:39)   Cholesterol, Serum: 148 mg/dL   Triglycerides, Serum: 98 mg/dL   HDL Cholesterol, Serum: 45 mg/dL   Non HDL Cholesterol: 103  LDL Cholesterol Calculated: 83 mg/dL   Thyroid Stimulating Hormone, Serum: 7.00 uIU/mL (03.05.22 @ 10:48)   A1C with Estimated Average Glucose Result: 4.8:    EKG(personally reviewed): Sinus 63 bpm qtc 413 ms 3/7    RADIOLOGY & ADDITIONAL TESTS:    Imaging Personally Reviewed:  `< from: Xray Chest 1 View- PORTABLE-Urgent (Xray Chest 1 View- PORTABLE-Urgent .) (03.02.22 @ 20:40) >  IMPRESSION:    Clear lungs.    No acute displaced fracture is seen.    < end of copied text >  < from: Xray Pelvis AP only (03.02.22 @ 20:40) >  IMPRESSION:    No acute displaced pelvic fracture.    < end of copied text >  < from: Xray Shoulder 2 Views, Left (03.02.22 @ 20:41) >  IMPRESSION:    No acute fracture or dislocation.    < end of copied text >  < from: CT Cervical Spine No Cont (03.02.22 @ 20:58) >  IMPRESSION:    CT BRAIN: No acute intracranial bleeding.    CT CERVICAL SPINE: No fracture. Multilevel spondylosis.    < end of copied text >  < from: CT Angio Head w/ IV Cont (03.02.22 @ 20:58) >    IMPRESSION:    CTA BRAIN: Patent intracranial circulation. No flow-limiting stenosis or   occlusion.    CTA NECK: Patent cervical vasculature. No flow limiting stenosis or   occlusion.    < end of copied text >  < from: CT Angio Heart and Coronaries w/ IV Cont (03.07.22 @ 14:35) >  IMPRESSION:  1.  Coronary artery disease:  LM:  minimal noncalcified plaque  LAD:  minimal (<30%) calcified plaque at the ostium  minimal (<30%) noncalcified and calcified plaque in the proximal segment  mild (30-49%) predominantly calcified plaque in the mid segment  minimal (<30%) noncalcified and calcified plaque in the distal segment  LCX:  moderate (approximately 50%) mixed noncalcified and calcified plaque   extending from the ostium to the proximal segment  mild (30-49%) noncalcified and calcified plaque in the distal segment  mild (30-49%) noncalcified and calcified plaque in the OM branch  RCA:  mild (30-49%) noncalcified and calcified plaque in the proximal segment  minimal (<30%) noncalcified plaque in the PDA and RPL branch  2.  Severe coronary artery calcium (Agatston score 421) as delineated   above.  3. Left upper lobe subsegmental pulmonary arterial embolus seen within   the partially imaged lungs. A dedicated CT pulmonary angiogram is   recommended for complete evaluation.  4. Acute appearing fractures of the right fifth and sixth ribs.    < end of copied text >  < from: CT Angio Chest PE Protocol w/ IV Cont (03.08.22 @ 13:52) >    IMPRESSION:    No pulmonary embolism.    < end of copied text >  < from: VA Duplex Lower Ext Vein Scan, Bilat (03.09.22 @ 15:01) >  IMPRESSION:  No evidence of deep venous thrombosis in either lower extremity.    < end of copied text >    < from: TTE with Doppler (w/Cont) (03.04.22 @ 11:00) >  Conclusions:  1. Aortic Root: 2.7cm.  Sinotubular Junction: 3 cm.  Ascending Aorta: 3.4 cm.  LVOT diameter: 2.1 cm.  2. Normal left ventricular internal dimensions and wall  thickness.  3. Normal left ventricular systolic function.   Endocardial  visualization enhanced with intravenous injection of  Ultrasonic Enhancing Agent (Definity).  4. Pericardial fat pad noted.   Trace pericardial effusion.  *** No previous Echo exam.    < end of copied text >      Consultant(s) Notes Reviewed:  [facility] hospital course, BH, Cards, pulmonary evaluation    Care Discussed with Consultants/Other Providers: Dr. Wolf re: pre-op evaluation -> able to proceed w/ ECT w/o need for further cardiac w/u, await dental evaluation, CT coronaries w/ non-obstructive CAD, on asa, likely would benefit from statin but can hold off for now in setting of transaminitis, was on zocor previously, started on synthroid at [facility], should f/u PMD for repeat TFTs, patient w/ complaints of constipation (can start senna 2 tabs qhs - patient can refuse), and nicotine cravings (can offer nicotine replacement therapy)

## 2022-03-11 NOTE — PSYCHIATRIC REHAB INITIAL EVALUATION - NSBHPRRECOMMEND_PSY_ALL_CORE
Writer met with the patient to orient her to the psych rehab services and to establish therapeutic goals. Patient stated her goal is to find her purpose in life and not feel depressed. According to the patient she was hospitalized due to increased symptoms of depression, anxiety and following a suicide attempt via OD. According to the patient over the past year she has been feeling as if her life has no purpose. Patient stated she cannot find any haile in her daily activities. According to the patient over it has been difficult for her to adjust to her best friend moving to [place] and her psychiatrist of 30 years retiring. According to the patient she has a very long history of depression and anxiety which started after her first divorce when she was [age]. According to the chart patient has a history of two prior psychiatric hospitalization and two prior suicide attempts.

## 2022-03-11 NOTE — BH INPATIENT PSYCHIATRY PROGRESS NOTE - NSBHASSESSSUMMFT_PSY_ALL_CORE
Ms. Salazar is a 71 woman with prior psychiatric history of depression and prior suicide attempts (via OD), who carries a hx of Bipolar 2 disaorder, 2 psych admissions (1987, 1992),  no h/o substance abuse, transferred to [facility] from [facility] for management of depression after being stabilized for  AMS related to a suicide attempts via OD on 90 tabs Ativan.    Ms Salazar lives alone, she is , has no children and is retired . She still volunteers sometimes in school and identifies her brother as her main source of support.    On initial assessment Ms. Salazar reports worsening depression of 1 year evolution with recent suicide attempt vis overdose on ativan, lamictal and seroquel. Pt reports low mood, anxiety, feelings of worthlessness, hopelessness and overwhelming loneliness. Denies active SIIP, contracts for safety. Predisposing factors include chronic hx of depression, unclear hx of bipolar 2 disorder and multiple failed antidepressant trials. Precipitating factors include 2 recent significant losses: her psychiatrist of 30 years (Dr. Kong) retired and one of her closest friends moved to [place]. Ms. Salazar appears to be unhappy with her new treatment team, idealizing Dr. Kong and describing her new psychiatrist and therapist as dismissive and uncaring. Ms. Salazar was tearful while sharing her story but expressed a sense of hope that with ECT she will once again feel like herself again.     3/11: Taking meds, denies side effects, eating and drinking well. ECT consult today.     Plan:  -Admit to [facility]  -2P  -Routine checks  -Continue Lamictal 125mg QD +150mg QHS  -Seroquel 200mg QHS  -Pt has been taking ativan 0.5mg TID at [place], will begin tapering by lowering her morning dose to 0.25mg  -ECT consult and clearance, will need dental clearance as well.  -Pt would benefit from 1:1 psychotherapy, will refer to Dr. Rossi.

## 2022-03-11 NOTE — ECT CONSULT NOTE - OTHER PAST PSYCHIATRIC HISTORY (INCLUDE DETAILS REGARDING ONSET, COURSE OF ILLNESS, INPATIENT/OUTPATIENT TREATMENT)
Ms. Salazar is a 72 yo female, , domiciled alone, retired , with h/o 2 previous psychiatric hospitalizations (, -3), h/o 2 previous suicide attempts, h/o Bipolar II disorder,  h/o good response to ECT during 3 previous episodes, no h/o substance abuse was transferred to [facility] from [facility] where she was admitted with altered mental status following a suicide attempt via overdosing on 90 tablets of ativan 0.5 mg, 2 tabs Seroquel 200mg and 2 tabs Lamictal 100mg. She was referred for a pre-ECT evaluation.     Ms. Salazar's chart was reviewed and she was interviewed on the unit.  Ms. Salazar reports that her last episode of depression was around  and since then she had been doing well. She started to feel depressed when her girl friend moved to [place] with a very short notice. Reports that she "started to feel Jealous of her. Oxana is a go-getter. She made the decision to move to [place] near her son, made 2 visits to [place], found people to work for her and made all the arrangements to relocate and moved there". In September, her long-term psychiatrist, Dr. Kong announced his [facility].  They were already working on changing her medications as she had started to feel depressed from April. Her depression progressively worsened. When her friends and brother would do different activities (e.g. Yoga ) and encourage her to do so, she would feel as being inadequate and as if they were blaming her. She reports "I started to have problem loving myself, comparing to others. I couldn't find any reason to love myself. I tried hard. I don't trust myself".  She attempted suicide by overdosing as she felt that "I have had it".  She had written checks so that her brother can use it towards her .     She currently continues to feel depressed, rates it as 9/10, with 10 being the worst depression. It is worse in the morning. It is associated with anxiety. When she becomes anxious she starts to get angry and upset and starts to think of "Devil" that "Devil" has everything against her and everything is going to go wrong. Her energy is low. Prior to hospitalization, she was not able to cry and now she is crying daily and finds it "cathartic". Her sleep and appetite are normal. She reports passive death wishes. Denies active suicidal ideation/intent/plan. She denies any hallucinations. Towards the end of the interview, she was becoming anxious  that the staff may not save her dinner and started to become angry, using curse words, eventhough she was told that her dinner was saved for her. In those moments she again brought up the feeling that "Devil is against me/ trying to get me".     PAST PSYCHIATRIC HISTORY:  h/o 2 previous suicide attempts (once by cutting and another by overdose). H/o 2 psychiatric hospitalization (, -3), H/o 3 ECT courses in past (once with Dr. Edmonds, Twice with Dr. Kong). H/o atleast 1 hypomanic episode in past.     SOCIAL HISTORY: Patient is  with no children, domiciled alone. Retired . 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 Her brother is her main social support. She has some friends. One of her close friends (Oxana) recently moved to [place].  Denies alcohol, tobacco or drug use. Ms. Salazar is a 70 yo female, , domiciled alone, retired , with h/o 2 previous psychiatric hospitalizations (, -3), h/o 2 previous suicide attempts, h/o Bipolar II disorder,  h/o good response to ECT during 3 previous episodes, no h/o substance abuse was transferred to [facility] from [facility] where she was admitted with altered mental status following a suicide attempt via overdosing on 90 tablets of ativan 0.5 mg, 2 tabs Seroquel 200mg and 2 tabs Lamictal 100mg. She was referred for a pre-ECT evaluation.     Ms. Salazar's chart was reviewed and she was interviewed on the unit.  Ms. Salazar reports that her last episode of depression was around  and since then she had been doing well. She started to feel depressed when her girl friend moved to [place] with a very short notice. Reports that she "started to feel Jealous of her. Oxana is a go-getter. She made the decision to move to [place] near her son, made 2 visits to [place], found people to work for her and made all the arrangements to relocate and moved there". In September, her long-term psychiatrist, Dr. Kong announced his snf.  They were already working on changing her medications as she had started to feel depressed from April. Her depression progressively worsened. When her friends and brother would do different activities (e.g. Yoga ) and encourage her to do so, she would feel as being inadequate and as if they were blaming her. She reports "I started to have problem loving myself, comparing to others. I couldn't find any reason to love myself. I tried hard. I don't trust myself".  She attempted suicide by overdosing as she felt that "I have had it".  She had written checks so that her brother can use it towards her .     She currently continues to feel depressed, rates it as 9/10, with 10 being the worst depression. It is worse in the morning. It is associated with anxiety. When she becomes anxious she starts to get angry and upset and starts to think of "Devil" that "Devil" has everything against her and everything is going to go wrong. Her energy is low. Prior to hospitalization, she was not able to cry and now she is crying daily and finds it "cathartic". Her sleep and appetite are normal. She reports passive death wishes. Denies active suicidal ideation/intent/plan. She denies any hallucinations. Towards the end of the interview, she was becoming anxious  that the staff may not save her dinner and started to become angry, using curse words, eventhough she was told that her dinner was saved for her. In those moments she again brought up the feeling that "Devil is against me/ trying to get me".     PAST PSYCHIATRIC HISTORY:  h/o 2 previous suicide attempts (once by cutting and another by overdose). H/o 2 psychiatric hospitalization (, -3), H/o 3 ECT courses in past (once with Dr. Edmonds, Twice with Dr. Kong). H/o atleast 1 hypomanic episode in past.     SOCIAL HISTORY: Patient is  with no children, domiciled alone. Retired . 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 Her brother is her main social support. She has some friends. One of her close friends (Oxana) recently moved to [place].  Denies alcohol or recreational drug use. Uses vapes (e-cigarettes)

## 2022-03-11 NOTE — BH INPATIENT PSYCHIATRY PROGRESS NOTE - NSBHCHARTREVIEWVS_PSY_A_CORE FT
Vital Signs Last 24 Hrs  T(C): 36.9 (03-11-22 @ 08:00), Max: 37 (03-10-22 @ 15:37)  T(F): 98.5 (03-11-22 @ 08:00), Max: 98.6 (03-10-22 @ 15:37)  HR: 85 (03-10-22 @ 14:44) (85 - 85)  BP: 151/78 (03-10-22 @ 14:44) (151/78 - 151/78)  BP(mean): --  RR: 18 (03-10-22 @ 15:37) (18 - 18)  SpO2: 98% (03-10-22 @ 15:37) (97% - 98%)    Orthostatic VS  03-11-22 @ 09:14  Lying BP: --/-- HR: --  Sitting BP: 114/69 HR: 89  Standing BP: 106/75 HR: 93  Site: --  Mode: --  Orthostatic VS  03-11-22 @ 08:00  Lying BP: --/-- HR: --  Sitting BP: 156/84 HR: 80  Standing BP: 111/76 HR: 85  Site: upper right arm  Mode: electronic  Orthostatic VS  03-10-22 @ 15:37  Lying BP: --/-- HR: --  Sitting BP: 136/85 HR: 82  Standing BP: 139/93 HR: 83  Site: upper right arm  Mode: electronic

## 2022-03-11 NOTE — BH INPATIENT PSYCHIATRY PROGRESS NOTE - CURRENT MEDICATION
MEDICATIONS  (STANDING):  aspirin enteric coated 81 milliGRAM(s) Oral daily  influenza  Vaccine (HIGH DOSE) 0.7 milliLiter(s) IntraMuscular once  lamoTRIgine 125 milliGRAM(s) Oral daily  lamoTRIgine 150 milliGRAM(s) Oral at bedtime  levothyroxine 25 MICROGram(s) Oral daily  LORazepam     Tablet 0.25 milliGRAM(s) Oral daily  LORazepam     Tablet 0.5 milliGRAM(s) Oral <User Schedule>  QUEtiapine 200 milliGRAM(s) Oral at bedtime    MEDICATIONS  (PRN):

## 2022-03-12 DIAGNOSIS — E03.9 HYPOTHYROIDISM, UNSPECIFIED: ICD-10-CM

## 2022-03-12 PROCEDURE — 99231 SBSQ HOSP IP/OBS SF/LOW 25: CPT

## 2022-03-12 RX ADMIN — LAMOTRIGINE 125 MILLIGRAM(S): 25 TABLET, ORALLY DISINTEGRATING ORAL at 08:23

## 2022-03-12 RX ADMIN — Medication 2 MILLIGRAM(S): at 11:45

## 2022-03-12 RX ADMIN — QUETIAPINE FUMARATE 200 MILLIGRAM(S): 200 TABLET, FILM COATED ORAL at 21:19

## 2022-03-12 RX ADMIN — LAMOTRIGINE 150 MILLIGRAM(S): 25 TABLET, ORALLY DISINTEGRATING ORAL at 21:19

## 2022-03-12 RX ADMIN — Medication 0.5 MILLIGRAM(S): at 15:32

## 2022-03-12 RX ADMIN — Medication 0.5 MILLIGRAM(S): at 21:19

## 2022-03-12 RX ADMIN — Medication 25 MICROGRAM(S): at 08:23

## 2022-03-12 RX ADMIN — Medication 0.25 MILLIGRAM(S): at 08:23

## 2022-03-12 RX ADMIN — Medication 81 MILLIGRAM(S): at 08:23

## 2022-03-12 NOTE — BH INPATIENT PSYCHIATRY PROGRESS NOTE - NSBHCHARTREVIEWVS_PSY_A_CORE FT
Vital Signs Last 24 Hrs  T(C): 37 (03-12-22 @ 06:41), Max: 37 (03-12-22 @ 06:41)  T(F): 98.6 (03-12-22 @ 06:41), Max: 98.6 (03-12-22 @ 06:41)    03-12-22 @ 06:41  Sitting BP: 109/63 HR: 77

## 2022-03-12 NOTE — BH INPATIENT PSYCHIATRY PROGRESS NOTE - PRN MEDS
MEDICATIONS  (PRN):  nicotine  Polacrilex Gum 2 milliGRAM(s) Oral every 2 hours PRN nicotine craving

## 2022-03-12 NOTE — BH INPATIENT PSYCHIATRY PROGRESS NOTE - NSBHFUPINTERVALHXFT_PSY_A_CORE
Patient seen for follow-up for anxiety, depression and recent overdose. Chart reviewed and case discussed with nursing staff. No significant overnight event reported. On exam, patient is pleasant, states she has been feeling anxious about coming off her "tranquilizer" and restarting ECT. Writer provides validation of feelings and encourages patient to remain complaint with treatment plan as she has been tolerating the taper well so far. Patient denies any current thoughts of self-harm. Appetite and sleep are adequate. Taking meds.

## 2022-03-12 NOTE — BH INPATIENT PSYCHIATRY PROGRESS NOTE - NSBHMETABOLIC_PSY_ALL_CORE_FT
BMI: BMI (kg/m2): 24.9 (03-10-22 @ 15:37)  HbA1c: A1C with Estimated Average Glucose Result: 4.8 % (03-05-22 @ 10:01)  Glucose: POCT Blood Glucose.: 113 mg/dL (03-02-22 @ 19:36)  Lipid Panel: Date/Time: 03-05-22 @ 10:39  Cholesterol, Serum: 148  HDL Cholesterol, Serum: 45  Triglycerides, Serum: 98

## 2022-03-12 NOTE — BH INPATIENT PSYCHIATRY PROGRESS NOTE - NSBHASSESSSUMMFT_PSY_ALL_CORE
71-year-old woman with PPH of Bipolar 2 disorder, 2 prior suicide attempts (via OD), 2 psych admissions (1987, 1992), no h/o substance abuse, transferred to [facility] from [facility] for management of depression after being stabilized for AMS after patient overdosed on 90 tabs of Ativan. Patient has had ECT in the past, aim is to restart ECT during current hospitalization.     Patient anxious about Ativan taper and restarting ECT (awaiting dental clearance). Denying any current thoughts of self-harm. Taking meds.     Plan:  -Will continue Lamictal 125mg QD +150mg QHS  -Will continue Seroquel 200mg QHS  -Ativan taper as per primary team  -Will continue medication for hypothyroidism.

## 2022-03-13 LAB — SARS-COV-2 RNA SPEC QL NAA+PROBE: SIGNIFICANT CHANGE UP

## 2022-03-13 PROCEDURE — 99231 SBSQ HOSP IP/OBS SF/LOW 25: CPT

## 2022-03-13 RX ADMIN — QUETIAPINE FUMARATE 200 MILLIGRAM(S): 200 TABLET, FILM COATED ORAL at 21:36

## 2022-03-13 RX ADMIN — LAMOTRIGINE 150 MILLIGRAM(S): 25 TABLET, ORALLY DISINTEGRATING ORAL at 19:03

## 2022-03-13 RX ADMIN — Medication 2 MILLIGRAM(S): at 17:33

## 2022-03-13 RX ADMIN — Medication 0.5 MILLIGRAM(S): at 14:50

## 2022-03-13 RX ADMIN — Medication 0.25 MILLIGRAM(S): at 08:31

## 2022-03-13 RX ADMIN — Medication 25 MICROGRAM(S): at 08:30

## 2022-03-13 RX ADMIN — Medication 81 MILLIGRAM(S): at 08:30

## 2022-03-13 RX ADMIN — LAMOTRIGINE 125 MILLIGRAM(S): 25 TABLET, ORALLY DISINTEGRATING ORAL at 08:30

## 2022-03-13 RX ADMIN — Medication 0.5 MILLIGRAM(S): at 21:36

## 2022-03-13 NOTE — BH INPATIENT PSYCHIATRY PROGRESS NOTE - NSBHFUPINTERVALHXFT_PSY_A_CORE
Patient seen for follow-up for anxiety, depression and recent overdose. Patient reports that she is concerned about how long she will be in the hospital.  Regrets overdose. Worried about how long it will take to start ECT.

## 2022-03-13 NOTE — BH INPATIENT PSYCHIATRY PROGRESS NOTE - NSBHASSESSSUMMFT_PSY_ALL_CORE
71-year-old woman with PPH of Bipolar 2 disorder, 2 prior suicide attempts (via OD), 2 psych admissions (1987, 1992), no h/o substance abuse, transferred to [facility] from [facility] for management of depression after being stabilized for AMS after patient overdosed on 90 tabs of Ativan. Patient has had ECT in the past, aim is to restart ECT during current hospitalization.     Awaiting clearance for ECT. Denying any current thoughts of self-harm. Taking meds.     Plan:  -Will continue Lamictal 125mg QD +150mg QHS  -Will continue Seroquel 200mg QHS  -Ativan taper as per primary team  -Will continue medication for hypothyroidism.

## 2022-03-13 NOTE — BH INPATIENT PSYCHIATRY PROGRESS NOTE - NSBHCHARTREVIEWVS_PSY_A_CORE FT
Vital Signs Last 24 Hrs  T(C): 36.9 (03-13-22 @ 08:02), Max: 36.9 (03-13-22 @ 08:02)  T(F): 98.4 (03-13-22 @ 08:02), Max: 98.4 (03-13-22 @ 08:02)  HR: --  BP: --  BP(mean): --  RR: --  SpO2: --    Orthostatic VS  03-13-22 @ 08:02  Lying BP: --/-- HR: --  Sitting BP: 106/59 HR: 81  Standing BP: --/-- HR: --  Site: --  Mode: --  Orthostatic VS  03-12-22 @ 06:41  Lying BP: --/-- HR: --  Sitting BP: 109/63 HR: 77  Standing BP: --/-- HR: --  Site: --  Mode: --

## 2022-03-13 NOTE — BH INPATIENT PSYCHIATRY PROGRESS NOTE - MSE UNSTRUCTURED FT
Patient is awake and alert. Affect down. Speech fluent. TP coherent, logical. No delusions. No perceptual disturbance. SOme motor retardation. Denies   any thoughts of hurting herself.

## 2022-03-14 PROCEDURE — 90837 PSYTX W PT 60 MINUTES: CPT

## 2022-03-14 PROCEDURE — 99232 SBSQ HOSP IP/OBS MODERATE 35: CPT

## 2022-03-14 RX ORDER — SENNA PLUS 8.6 MG/1
2 TABLET ORAL AT BEDTIME
Refills: 0 | Status: DISCONTINUED | OUTPATIENT
Start: 2022-03-14 | End: 2022-04-21

## 2022-03-14 RX ADMIN — Medication 0.25 MILLIGRAM(S): at 08:28

## 2022-03-14 RX ADMIN — LAMOTRIGINE 125 MILLIGRAM(S): 25 TABLET, ORALLY DISINTEGRATING ORAL at 08:29

## 2022-03-14 RX ADMIN — SENNA PLUS 2 TABLET(S): 8.6 TABLET ORAL at 21:31

## 2022-03-14 RX ADMIN — Medication 0.5 MILLIGRAM(S): at 21:31

## 2022-03-14 RX ADMIN — Medication 81 MILLIGRAM(S): at 08:29

## 2022-03-14 RX ADMIN — LAMOTRIGINE 150 MILLIGRAM(S): 25 TABLET, ORALLY DISINTEGRATING ORAL at 21:31

## 2022-03-14 RX ADMIN — Medication 2 MILLIGRAM(S): at 18:42

## 2022-03-14 RX ADMIN — Medication 0.5 MILLIGRAM(S): at 15:09

## 2022-03-14 RX ADMIN — QUETIAPINE FUMARATE 200 MILLIGRAM(S): 200 TABLET, FILM COATED ORAL at 21:31

## 2022-03-14 RX ADMIN — Medication 25 MICROGRAM(S): at 08:29

## 2022-03-14 NOTE — BH INPATIENT PSYCHIATRY PROGRESS NOTE - NSBHMETABOLIC_PSY_ALL_CORE_FT
BMI: BMI (kg/m2): 24.9 (03-10-22 @ 15:37)  HbA1c: A1C with Estimated Average Glucose Result: 4.8 % (03-05-22 @ 10:01)  Glucose: POCT Blood Glucose.: 113 mg/dL (03-02-22 @ 19:36)    Lipid Panel: Date/Time: 03-05-22 @ 10:39  Cholesterol, Serum: 148  Direct LDL: --  HDL Cholesterol, Serum: 45  Total Cholesterol/HDL Ration Measurement: --  Triglycerides, Serum: 98   BMI: BMI (kg/m2): 24.9 (03-10-22 @ 15:37)  HbA1c: A1C with Estimated Average Glucose Result: 4.8 % (03-05-22 @ 10:01)    Glucose: POCT Blood Glucose.: 113 mg/dL (03-02-22 @ 19:36)    BP: --  Lipid Panel: Date/Time: 03-05-22 @ 10:39  Cholesterol, Serum: 148  Direct LDL: --  HDL Cholesterol, Serum: 45  Total Cholesterol/HDL Ration Measurement: --  Triglycerides, Serum: 98

## 2022-03-14 NOTE — BH INPATIENT PSYCHIATRY PROGRESS NOTE - MSE UNSTRUCTURED FT
Patient is awake and alert. Casually dressed, appropriate grooming and hygiene. Mild PMR. No PMA or other abnormal movements. Speech fluent, talkative. Mood is "upset." Affect is anxious, constricted. TP coherent, logical, overinclusive. No delusions. No perceptual disturbance. Denies any thoughts of hurting herself. Insight and judgment limited.

## 2022-03-14 NOTE — BH INPATIENT PSYCHIATRY PROGRESS NOTE - CURRENT MEDICATION
MEDICATIONS  (STANDING):  aspirin enteric coated 81 milliGRAM(s) Oral daily  influenza  Vaccine (HIGH DOSE) 0.7 milliLiter(s) IntraMuscular once  lamoTRIgine 150 milliGRAM(s) Oral at bedtime  lamoTRIgine 125 milliGRAM(s) Oral daily  levothyroxine 25 MICROGram(s) Oral daily  LORazepam     Tablet 0.25 milliGRAM(s) Oral daily  LORazepam     Tablet 0.5 milliGRAM(s) Oral <User Schedule>  QUEtiapine 200 milliGRAM(s) Oral at bedtime  senna 2 Tablet(s) Oral at bedtime   MEDICATIONS  (STANDING):  aspirin enteric coated 81 milliGRAM(s) Oral daily  influenza  Vaccine (HIGH DOSE) 0.7 milliLiter(s) IntraMuscular once  lamoTRIgine 125 milliGRAM(s) Oral daily  lamoTRIgine 150 milliGRAM(s) Oral at bedtime  levothyroxine 25 MICROGram(s) Oral daily  LORazepam     Tablet 0.25 milliGRAM(s) Oral daily  LORazepam     Tablet 0.5 milliGRAM(s) Oral <User Schedule>  QUEtiapine 200 milliGRAM(s) Oral at bedtime  senna 2 Tablet(s) Oral at bedtime    MEDICATIONS  (PRN):  nicotine  Polacrilex Gum 2 milliGRAM(s) Oral every 2 hours PRN nicotine craving

## 2022-03-14 NOTE — BH INPATIENT PSYCHIATRY PROGRESS NOTE - NSBHFUPINTERVALHXFT_PSY_A_CORE
Patient seen for follow-up for anxiety, depression and recent overdose. Patient is adherent with all medications. No acute events or emergency PRNs required over the weekend. Patient is awaiting dental consult (scheduled for 3/16) prior to beginning ECT. She continues to endorse low mood and anxiety and is very upset over being placed with a very disorganized roommate. She says that she regrets her SA and wants to get better.

## 2022-03-14 NOTE — BH INPATIENT PSYCHIATRY PROGRESS NOTE - NSBHCHARTREVIEWVS_PSY_A_CORE FT
Vital Signs Last 24 Hrs  T(C): 36.4 (03-14-22 @ 08:19), Max: 36.4 (03-14-22 @ 08:19)  T(F): 97.6 (03-14-22 @ 08:19), Max: 97.6 (03-14-22 @ 08:19)  HR: --  BP: --  BP(mean): --  RR: --  SpO2: --    Orthostatic VS  03-14-22 @ 08:19  Lying BP: --/-- HR: --  Sitting BP: 104/75 HR: 85  Standing BP: 95/78 HR: 106  Site: upper left arm  Mode: electronic  Orthostatic VS  03-13-22 @ 08:02  Lying BP: --/-- HR: --  Sitting BP: 106/59 HR: 81  Standing BP: --/-- HR: --  Site: --  Mode: --

## 2022-03-14 NOTE — BH INPATIENT PSYCHIATRY PROGRESS NOTE - NSBHASSESSSUMMFT_PSY_ALL_CORE
71-year-old woman with documented PPH of Bipolar 2 disorder (though questionable if hx is consistent with ahsan/hypomania), 2 prior suicide attempts (via OD), 2 psych admissions (1987, 1992), no h/o substance abuse, transferred to [facility] from [facility] for management of depression after being stabilized for AMS after patient overdosed on 90 tabs of Ativan. Patient has had ECT in the past, aim is to restart ECT during current hospitalization.     Ms Salazar lives alone, she is , has no children and is retired . She still volunteers sometimes in school and identifies her brother as her main source of support.    On initial assessment Ms. Salazar reports worsening depression of 1 year evolution with recent suicide attempt vis overdose on ativan, lamictal and seroquel. Pt reports low mood, anxiety, feelings of worthlessness, hopelessness and overwhelming loneliness. Denies active SIIP, contracts for safety. Predisposing factors include chronic hx of depression, unclear hx of bipolar 2 disorder and multiple failed antidepressant trials. Precipitating factors include 2 recent significant losses: her psychiatrist of 30 years (Dr. Kong) retired and one of her closest friends moved to [place]. Ms. Salazar appears to be unhappy with her new treatment team, idealizing Dr. Kong and describing her new psychiatrist and therapist as dismissive and uncaring. Ms. Salazar was tearful while sharing her story but expressed a sense of hope that with ECT she will once again feel like herself again    Awaiting dental consult for ECT clearance. Adherent with medications, denies side effects. Denies SI.     Plan:  Legal: 9.27 admission  Safety: routine observation  Psychiatric:   -Will continue Lamictal 125mg QD +150mg QHS, plan to discontinue AM 125mg dose on Wed 3/16 in anticipation of ECT  -Hold hs Lamictal the night before ECT (hold Sun, Tues, and Thurs evenings once she starts ECT)  -Will continue Seroquel 200mg QHS  -Will continue Ativan taper - currently on 0.25mg qAM, 0.5mg at 3PM, 0.5mg at 9PM  Medical:   -Will continue Synthroid 25mg daily, senna qhs, ASA 81mg daily  -Pt with elevation of TSH to 7.00 (up from 4.05) and bump in LFTs during medical hospitalization; plan to recheck with routine labs

## 2022-03-14 NOTE — PATIENT PROFILE ADULT - FUNCTIONAL ASSESSMENT - DAILY ACTIVITY 2.
[FreeTextEntry1] : This is a very pleasant 36-year-old gentleman with a nodular sclerosing classic Hodgkin lymphoma. Clinically at this point he appears to be at least stage IIIB given the findings of lymphadenopathy on both sides of diaphragm. PET scan performed on 1/31/2020 revealed abnormal bilateral cervical FDG avid lymphadenopathy, abnormal FDG avid adenopathy seen at the thoracic inlet/supraclavicular region bilaterally, enlarged FDG avid left axillary lymphadenopathy with extensive mediastinal and bilateral hilar abnormal FDG avid lymphadenopathy, right femoral and inguinal abnormal FDG avid hypermetabolic lymph nodes.\par I also obtained a bone marrow biopsy to determine if there was bone marrow involvement. I have discussed the possibility of infertility secondary to chemotherapy administration and have counseled him on sperm banking. He reports to me that he has thought about it and is not interested in undergoing this. I have spoken to Dr. Kitchen and he cleared him for bleomycin use. He had also been cleared by cardiology for anthracycline use. I have counseled him on smoking cessation and have strongly encouraged him to do this. We reviewed antiemetic use. He may use Prochlorperazine prophylactically on days 3 and 4. Repeat PET imaging after cycle #2 showed a significant response with Deauville scores of 2-3. Therefore he proceeded on with 4 additional cycles with bleomycin being omitted with cycles 3 through 6.\par He continues to receive AVD and is tolerating it well. \par PFT in June.\par  PET/CT 4/20 showed favorable response with follow up CT Chest 6/20 which showed continued improvement of mediastinal, right hilar and left axillary adenopathy and decreasing fight middle lobe infiltrate. \par Cycle 6 of AVD completed.  \par Repeat PET imaging on 9/11/20 showed resolution of all prior hypermetabolic activity and further decrease in size of the mediastinal LN's.  The was note of a small left pleural effusion.  These results were forwarded to his pulmonologist Dr. Kitchen.  Most recent CAT scan CAP reimaging on 12/21/2021 showed a continued decrease in size of his known lymphadenopathy, which was nonhypermetabolic on the last PET scan, as well as some persistent groundglass in the right middle lobe with resolution of some previously seen foci of groundglass.  The results of the scan was forwarded to his pulmonologist and he knows to follow-up with him.\par He is urged once again to follow-up with pulmonology.\par Discussed smoking cessation.  Referred to smoking cessation program, once again.\par He will take B vitamin complex to see if it helps with neuropathy.  If no improvement he will consider referral to neurology. \par Reviewed available labs which were normal.\par Repeat imaging in June\par Continue routine, age-appropriate, healthcare maintenance \par History of present illness, review of systems, physical exam and treatment plan reviewed with .\par Office visit in 12 weeks or prn for new or worsening symptoms. 
3 = A little assistance

## 2022-03-15 PROCEDURE — 99232 SBSQ HOSP IP/OBS MODERATE 35: CPT

## 2022-03-15 RX ADMIN — LAMOTRIGINE 125 MILLIGRAM(S): 25 TABLET, ORALLY DISINTEGRATING ORAL at 10:38

## 2022-03-15 RX ADMIN — Medication 0.25 MILLIGRAM(S): at 10:38

## 2022-03-15 RX ADMIN — QUETIAPINE FUMARATE 200 MILLIGRAM(S): 200 TABLET, FILM COATED ORAL at 21:42

## 2022-03-15 RX ADMIN — Medication 25 MICROGRAM(S): at 10:37

## 2022-03-15 RX ADMIN — LAMOTRIGINE 150 MILLIGRAM(S): 25 TABLET, ORALLY DISINTEGRATING ORAL at 21:42

## 2022-03-15 RX ADMIN — Medication 81 MILLIGRAM(S): at 10:37

## 2022-03-15 RX ADMIN — Medication 0.5 MILLIGRAM(S): at 21:42

## 2022-03-15 RX ADMIN — SENNA PLUS 2 TABLET(S): 8.6 TABLET ORAL at 21:42

## 2022-03-15 RX ADMIN — Medication 0.5 MILLIGRAM(S): at 15:11

## 2022-03-15 NOTE — BH INPATIENT PSYCHIATRY PROGRESS NOTE - NSBHFUPINTERVALHXFT_PSY_A_CORE
Patient seen for follow-up for anxiety, depression and recent overdose. Patient is adherent with all medications. No acute events or emergency PRNs required overnight. Per staff, patient is selectively social, mildly irritable, frequently in her room. Observed to participate in group this morning. Patient is awaiting dental consult (scheduled for 3/16) prior to beginning ECT. Today she continues to endorse low mood and anxiety over waiting to start ECT. She denies suicidality in the hospital.

## 2022-03-15 NOTE — BH INPATIENT PSYCHIATRY PROGRESS NOTE - CURRENT MEDICATION
MEDICATIONS  (STANDING):  aspirin enteric coated 81 milliGRAM(s) Oral daily  influenza  Vaccine (HIGH DOSE) 0.7 milliLiter(s) IntraMuscular once  lamoTRIgine 150 milliGRAM(s) Oral at bedtime  lamoTRIgine 125 milliGRAM(s) Oral daily  levothyroxine 25 MICROGram(s) Oral daily  LORazepam     Tablet 0.25 milliGRAM(s) Oral daily  LORazepam     Tablet 0.5 milliGRAM(s) Oral <User Schedule>  QUEtiapine 200 milliGRAM(s) Oral at bedtime  senna 2 Tablet(s) Oral at bedtime   MEDICATIONS  (STANDING):  aspirin enteric coated 81 milliGRAM(s) Oral daily  influenza  Vaccine (HIGH DOSE) 0.7 milliLiter(s) IntraMuscular once  lamoTRIgine 150 milliGRAM(s) Oral at bedtime  lamoTRIgine 125 milliGRAM(s) Oral daily  levothyroxine 25 MICROGram(s) Oral daily  LORazepam     Tablet 0.25 milliGRAM(s) Oral daily  LORazepam     Tablet 0.5 milliGRAM(s) Oral <User Schedule>  QUEtiapine 200 milliGRAM(s) Oral at bedtime  senna 2 Tablet(s) Oral at bedtime    MEDICATIONS  (PRN):  nicotine  Polacrilex Gum 2 milliGRAM(s) Oral every 2 hours PRN nicotine craving

## 2022-03-15 NOTE — BH INPATIENT PSYCHIATRY PROGRESS NOTE - MSE UNSTRUCTURED FT
Patient is awake and alert. Casually dressed, appropriate grooming and hygiene. Mild PMR. No PMA or other abnormal movements. Speech fluent, talkative. Mood is "anxious." Affect is anxious, constricted, mood congruent. TP coherent, logical, overinclusive. No delusions. No perceptual disturbance. Denies any thoughts of hurting herself. Insight and judgment limited.

## 2022-03-15 NOTE — BH INPATIENT PSYCHIATRY PROGRESS NOTE - NSBHCHARTREVIEWVS_PSY_A_CORE FT
Vital Signs Last 24 Hrs  T(C): 36.4 (03-15-22 @ 07:59), Max: 36.4 (03-15-22 @ 07:59)  T(F): 97.6 (03-15-22 @ 07:59), Max: 97.6 (03-15-22 @ 07:59)  HR: --  BP: --  BP(mean): --  RR: --  SpO2: --    Orthostatic VS  03-15-22 @ 07:59  Lying BP: --/-- HR: --  Sitting BP: 114/83 HR: 90  Standing BP: --/-- HR: --  Site: --  Mode: electronic  Orthostatic VS  03-14-22 @ 08:19  Lying BP: --/-- HR: --  Sitting BP: 104/75 HR: 85  Standing BP: 95/78 HR: 106  Site: upper left arm  Mode: electronic

## 2022-03-15 NOTE — BH INPATIENT PSYCHIATRY PROGRESS NOTE - NSBHASSESSSUMMFT_PSY_ALL_CORE
71-year-old woman with documented PPH of Bipolar 2 disorder (though questionable if hx is consistent with ahsan/hypomania), 2 prior suicide attempts (via OD), 2 psych admissions (1987, 1992), no h/o substance abuse, recently switched outpatient providers after her psychiatrist of 30 years (Dr. Kong) retired in late 2021, now transferred to [facility] from [facility] for management of depression after being stabilized for AMS after patient overdosed on 90 tabs of Ativan and several tabs of Lamictal and Seroquel iso 1 year of low mood, anxiety, feelings of worthlessness, hopelessness, and overwhelming loneliness. Patient has had ECT in the past, aim is to restart ECT during current hospitalization. Ms. Salazar appears to be unhappy with her new outpatient treatment team, idealizing Dr. Kong and describing her new psychiatrist and therapist as dismissive and uncaring. Ms. Salazar was tearful while sharing her story but expressed a sense of hope that with ECT she will once again feel like herself again.    Ms Slaazar lives alone, she is , has no children, and is retired . She still volunteers sometimes in school and identifies her brother as her main source of support.    Patient remains adherent with medications without reported side effects. Awaiting dental consult for ECT clearance. Today patient reports persistent anxiety over the perceived delay in starting ECT. She was counseled to continue working on her own and with psychologist on recognizing thought distortions and challenging these thoughts. She denies active SIIP and contracts for safety.    1. Legal: 9.27 admission  2. Safety: routine observation  3. Psychiatric:   -Will continue Lamictal 125mg QD +150mg QHS, plan to discontinue AM 125mg dose on Wed 3/16 in anticipation of ECT  -Hold hs Lamictal the night before ECT (hold Sun, Tues, and Thurs evenings once she starts ECT)  -Will continue Seroquel 200mg QHS  -Will continue Ativan taper, last decreased on 3/10 - currently on 0.25mg qAM, 0.5mg at 3PM, 0.5mg at 9PM  4. Medical:   -Will continue Synthroid 25mg daily, senna qhs, ASA 81mg daily  -Pt with elevation of TSH to 7.00 (up from 4.05) and bump in LFTs during medical hospitalization; plan to recheck with routine labs 71-year-old woman with documented PPH of Bipolar 2 disorder (though questionable if hx is consistent with ahsan/hypomania), 2 prior suicide attempts (via OD), 2 psych admissions (1987, 1992), no h/o substance abuse, recently switched outpatient providers after her psychiatrist of 30 years (Dr. Kong) retired in late 2021, now transferred to [facility] from [facility] for management of depression after being stabilized for AMS after patient overdosed on 90 tabs of Ativan and several tabs of Lamictal and Seroquel iso 1 year of low mood, anxiety, feelings of worthlessness, hopelessness, and overwhelming loneliness. Patient has had ECT in the past, aim is to restart ECT during current hospitalization. Ms. Salazar appears to be unhappy with her new outpatient treatment team, idealizing Dr. Kong and describing her new psychiatrist and therapist as dismissive and uncaring. Ms. Salazar was tearful while sharing her story but expressed a sense of hope that with ECT she will once again feel like herself again.    Ms Salazar lives alone, she is , has no children, and is retired . She still volunteers sometimes in school and identifies her brother as her main source of support.    Patient remains adherent with medications without reported side effects. Awaiting dental consult tomorrow for ECT clearance. Today patient reports persistent anxiety over the perceived delay in starting ECT. She was counseled to continue working on her own and with psychologist on recognizing thought distortions and challenging these thoughts. She denies active SIIP and contracts for safety.    1. Legal: 9.27 admission  2. Safety: routine observation  3. Psychiatric:   -Will continue Lamictal 125mg QD +150mg QHS, plan to discontinue AM 125mg dose on Wed 3/16 in anticipation of ECT  -Hold hs Lamictal the night before ECT (hold Sun, Tues, and Thurs evenings once she starts ECT)  -Will continue Seroquel 200mg QHS  -Will continue Ativan taper, last decreased on 3/10 - currently on 0.25mg qAM, 0.5mg at 3PM, 0.5mg at 9PM  4. Medical:   -Will continue Synthroid 25mg daily, senna qhs, ASA 81mg daily  -Pt with elevation of TSH to 7.00 (up from 4.05) and bump in LFTs during medical hospitalization; plan to recheck with routine labs

## 2022-03-16 PROCEDURE — 99232 SBSQ HOSP IP/OBS MODERATE 35: CPT

## 2022-03-16 RX ADMIN — LAMOTRIGINE 150 MILLIGRAM(S): 25 TABLET, ORALLY DISINTEGRATING ORAL at 20:50

## 2022-03-16 RX ADMIN — SENNA PLUS 2 TABLET(S): 8.6 TABLET ORAL at 20:50

## 2022-03-16 RX ADMIN — Medication 81 MILLIGRAM(S): at 09:27

## 2022-03-16 RX ADMIN — Medication 0.5 MILLIGRAM(S): at 20:50

## 2022-03-16 RX ADMIN — QUETIAPINE FUMARATE 200 MILLIGRAM(S): 200 TABLET, FILM COATED ORAL at 20:50

## 2022-03-16 RX ADMIN — Medication 0.25 MILLIGRAM(S): at 09:26

## 2022-03-16 RX ADMIN — LAMOTRIGINE 125 MILLIGRAM(S): 25 TABLET, ORALLY DISINTEGRATING ORAL at 09:27

## 2022-03-16 RX ADMIN — Medication 25 MICROGRAM(S): at 09:26

## 2022-03-16 RX ADMIN — Medication 0.5 MILLIGRAM(S): at 15:15

## 2022-03-16 NOTE — BH INPATIENT PSYCHIATRY PROGRESS NOTE - NSBHFUPINTERVALHXFT_PSY_A_CORE
Patient seen for follow-up for anxiety, depression and recent overdose. Patient is adherent with all medications. No acute events or emergency PRNs required overnight. Per staff, patient is visible, pleasant, cooperative. Scheduled for dental evaluation today in preparation for ECT.    Today patient reports persistent low mood and hopelessness. She reports that she spoke with her sister-in-law and a friend yesterday evening and felt hopeful and excited about some future plans (sister-in-law buying a summer house) but this morning she feels depressed again. She says "I'm afraid I'm going to go home and everything will be the same and I'll still be miserable." Patient says she feels like she "copped out" by trying to kill herself. Patient was encouraged to monitor her negative thoughts, feelings of low self-worth, and hopelessness, and to view them as symptoms of her depression. Writer taught patient about behavioral activation for improving her mood throughout the day. Patient denies acute suicidality in the hospital. She reports fair sleep and good appetite.

## 2022-03-16 NOTE — BH INPATIENT PSYCHIATRY PROGRESS NOTE - CURRENT MEDICATION
MEDICATIONS  (STANDING):  aspirin enteric coated 81 milliGRAM(s) Oral daily  influenza  Vaccine (HIGH DOSE) 0.7 milliLiter(s) IntraMuscular once  lamoTRIgine 150 milliGRAM(s) Oral at bedtime  levothyroxine 25 MICROGram(s) Oral daily  LORazepam     Tablet 0.25 milliGRAM(s) Oral daily  LORazepam     Tablet 0.5 milliGRAM(s) Oral <User Schedule>  QUEtiapine 200 milliGRAM(s) Oral at bedtime  senna 2 Tablet(s) Oral at bedtime    MEDICATIONS  (PRN):  nicotine  Polacrilex Gum 2 milliGRAM(s) Oral every 2 hours PRN nicotine craving

## 2022-03-16 NOTE — BH INPATIENT PSYCHIATRY PROGRESS NOTE - MSE UNSTRUCTURED FT
Patient is awake and alert. Casually dressed, appropriate grooming and hygiene. Mild PMR. No PMA or other abnormal movements. Speech fluent. Mood is "depressed." Affect is sad and anxious, constricted, mood congruent. TP coherent, logical, overinclusive. Rumination on negative self-talk. No delusions. No perceptual disturbance. Denies any thoughts of hurting herself. Insight and judgment limited.

## 2022-03-16 NOTE — BH INPATIENT PSYCHIATRY PROGRESS NOTE - NSBHCHARTREVIEWVS_PSY_A_CORE FT
Orthostatic VS  03-15-22 @ 07:59  Lying BP: --/-- HR: --  Sitting BP: 114/83 HR: 90  Standing BP: --/-- HR: --  Site: --  Mode: electronic   Vital Signs Last 24 Hrs  T(C): 36.6 (03-16-22 @ 08:01), Max: 36.6 (03-16-22 @ 08:01)  T(F): 97.9 (03-16-22 @ 08:01), Max: 97.9 (03-16-22 @ 08:01)  HR: --  BP: --  BP(mean): --  RR: --  SpO2: --    Orthostatic VS  03-16-22 @ 08:01  Lying BP: --/-- HR: --  Sitting BP: 101/84 HR: 88  Standing BP: --/-- HR: --  Site: --  Mode: --  Orthostatic VS  03-15-22 @ 07:59  Lying BP: --/-- HR: --  Sitting BP: 114/83 HR: 90  Standing BP: --/-- HR: --  Site: --  Mode: electronic

## 2022-03-16 NOTE — BH INPATIENT PSYCHIATRY PROGRESS NOTE - NSBHASSESSSUMMFT_PSY_ALL_CORE
71-year-old woman with documented PPH of Bipolar 2 disorder (though questionable if hx is consistent with ahsan/hypomania), 2 prior suicide attempts (via OD), 2 psych admissions (1987, 1992), no h/o substance abuse, recently switched outpatient providers after her psychiatrist of 30 years (Dr. Kong) retired in late 2021, now transferred to [facility] from [facility] for management of depression after being stabilized for AMS after patient overdosed on 90 tabs of Ativan and several tabs of Lamictal and Seroquel iso 1 year of low mood, anxiety, feelings of worthlessness, hopelessness, and overwhelming loneliness. Patient has had ECT in the past, aim is to restart ECT during current hospitalization. Ms. Salazar appears to be unhappy with her new outpatient treatment team, idealizing Dr. Kong and describing her new psychiatrist and therapist as dismissive and uncaring. Ms. Salazar was tearful while sharing her story but expressed a sense of hope that with ECT she will once again feel like herself again.    Ms Salazar lives alone, she is , has no children, and is retired . She still volunteers sometimes in school and identifies her brother as her main source of support.    Patient remains adherent with medications without reported side effects. Dental consult today for ECT clearance. Today patient reports persistent depressed mood and negative cognitive framework; writer counseled patient on behavioral activation and again encouraged patient to work on identifying cognitive distortions. She denies active SIIP and contracts for safety.    1. Legal: 9.27 admission  2. Safety: routine observation  3. Psychiatric:   -D/c AM Lamictal dose in anticipation of ECT, continue Lamictal 150mg QHS  -Will hold hs Lamictal the night before ECT (hold Sun, Tues, and Thurs evenings once she starts ECT)  -Continue Seroquel 200mg QHS  -Continue Ativan taper, last decreased on 3/10 - currently on 0.25mg qAM, 0.5mg at 3PM, 0.5mg at 9PM  -Will discuss initiation of SSRI with patient   4. Medical:   -Continue Synthroid 25mg daily, senna qhs, ASA 81mg daily  -Pt with elevation of TSH to 7.00 (up from 4.05) and bump in LFTs during medical hospitalization; plan to recheck with routine labs

## 2022-03-17 LAB — SARS-COV-2 RNA SPEC QL NAA+PROBE: SIGNIFICANT CHANGE UP

## 2022-03-17 PROCEDURE — 99232 SBSQ HOSP IP/OBS MODERATE 35: CPT

## 2022-03-17 PROCEDURE — 90832 PSYTX W PT 30 MINUTES: CPT

## 2022-03-17 PROCEDURE — 93010 ELECTROCARDIOGRAM REPORT: CPT

## 2022-03-17 RX ORDER — SERTRALINE 25 MG/1
25 TABLET, FILM COATED ORAL DAILY
Refills: 0 | Status: DISCONTINUED | OUTPATIENT
Start: 2022-03-17 | End: 2022-03-18

## 2022-03-17 RX ORDER — SERTRALINE 25 MG/1
25 TABLET, FILM COATED ORAL ONCE
Refills: 0 | Status: COMPLETED | OUTPATIENT
Start: 2022-03-17 | End: 2022-03-17

## 2022-03-17 RX ORDER — LAMOTRIGINE 25 MG/1
150 TABLET, ORALLY DISINTEGRATING ORAL
Refills: 0 | Status: DISCONTINUED | OUTPATIENT
Start: 2022-03-17 | End: 2022-03-31

## 2022-03-17 RX ADMIN — Medication 25 MICROGRAM(S): at 09:31

## 2022-03-17 RX ADMIN — Medication 81 MILLIGRAM(S): at 09:27

## 2022-03-17 RX ADMIN — Medication 0.5 MILLIGRAM(S): at 20:28

## 2022-03-17 RX ADMIN — QUETIAPINE FUMARATE 200 MILLIGRAM(S): 200 TABLET, FILM COATED ORAL at 20:28

## 2022-03-17 RX ADMIN — SERTRALINE 25 MILLIGRAM(S): 25 TABLET, FILM COATED ORAL at 12:44

## 2022-03-17 RX ADMIN — Medication 0.5 MILLIGRAM(S): at 15:13

## 2022-03-17 RX ADMIN — SENNA PLUS 2 TABLET(S): 8.6 TABLET ORAL at 20:28

## 2022-03-17 NOTE — BH INPATIENT PSYCHIATRY PROGRESS NOTE - NSBHCHARTREVIEWVS_PSY_A_CORE FT
Vital Signs Last 24 Hrs  T(C): 36.8 (03-17-22 @ 06:37), Max: 36.8 (03-17-22 @ 06:37)  T(F): 98.2 (03-17-22 @ 06:37), Max: 98.2 (03-17-22 @ 06:37)  HR: 74 (03-17-22 @ 15:24) (66 - 74)  BP: 120/66 (03-17-22 @ 15:24) (95/57 - 120/66)  BP(mean): --  RR: --  SpO2: --    Orthostatic VS  03-16-22 @ 08:01  Lying BP: --/-- HR: --  Sitting BP: 101/84 HR: 88  Standing BP: --/-- HR: --  Site: --  Mode: --   Vital Signs Last 24 Hrs  T(C): 36.8 (03-18-22 @ 12:30), Max: 36.9 (03-18-22 @ 06:29)  T(F): 98.2 (03-18-22 @ 12:30), Max: 98.4 (03-18-22 @ 06:29)  HR: 89 (03-18-22 @ 12:30) (74 - 95)  BP: 119/85 (03-18-22 @ 12:30) (104/76 - 154/100)  BP(mean): --  RR: 18 (03-18-22 @ 12:30) (16 - 19)  SpO2: 100% (03-18-22 @ 12:30) (98% - 100%)     Vital Signs Last 24 Hrs  T(C): 36.8 (03-18-22 @ 12:30), Max: 36.9 (03-18-22 @ 06:29)  T(F): 98.2 (03-18-22 @ 12:30), Max: 98.4 (03-18-22 @ 06:29)  HR: 89 (03-18-22 @ 12:30) (78 - 95)  BP: 119/85 (03-18-22 @ 12:30) (104/76 - 154/100)  BP(mean): --  RR: 18 (03-18-22 @ 12:30) (16 - 19)  SpO2: 100% (03-18-22 @ 12:30) (98% - 100%)

## 2022-03-17 NOTE — BH INPATIENT PSYCHIATRY PROGRESS NOTE - NSBHFUPINTERVALHXFT_PSY_A_CORE
Patient seen for follow-up for anxiety, depression and recent overdose. Patient is adherent with all medications. No acute events or emergency PRNs required overnight. Per staff, patient is visible, pleasant, cooperative. Scheduled for dental evaluation today in preparation for ECT.    Today patient reports persistent low mood and hopelessness. She reports that she spoke with her sister-in-law and a friend yesterday evening and felt hopeful and excited about some future plans (sister-in-law buying a summer house) but this morning she feels depressed again. She says "I'm afraid I'm going to go home and everything will be the same and I'll still be miserable." Patient says she feels like she "copped out" by trying to kill herself. Patient was encouraged to monitor her negative thoughts, feelings of low self-worth, and hopelessness, and to view them as symptoms of her depression. Writer taught patient about behavioral activation for improving her mood throughout the day. Patient denies acute suicidality in the hospital. She reports fair sleep and good appetite.  Patient seen for follow-up for anxiety, depression and recent overdose. Patient is adherent with all medications. No acute events or emergency PRNs required overnight. Pt looking forward to starting ECT tomorrow morning. Continues to express concerns and hopelessness about the future: "Im afraid I will go back home and try to kill myself all over again". Patient's thought content continues to include themes of guilt over her suicide attempt and negative self image "I'm lazy and selfish". Writer noted that patient refers to her suicide attempt as if it had been a competed suicide attempt, for instance making statements such as "I was lazy, I was depressed and I killed myself", "the day I killed myself" and so on. Pt reflected on this and responded "maybe it's because in a way I did kill myself and ruined my life, now im here" Patient seen for follow-up for anxiety, depression and recent overdose. Patient is adherent with all medications. No acute events or emergency PRNs required overnight. Pt looking forward to starting ECT tomorrow morning. Continues to express concerns and hopelessness about the future: "Im afraid I will go back home and try to kill myself all over again". Patient's thought content continues to include themes of guilt over her suicide attempt and negative self image "I'm lazy and selfish". Writer noted that patient refers to her suicide attempt as if it had been a competed suicide attempt, for instance making statements such as "I was lazy, I was depressed and I killed myself", "the day I killed myself" and so on. Pt reflected on this and responded "maybe it's because in a way I did kill myself and ruined my life, now im here". Encouraged patient to use this thought as mental exercise and to look for evidence supporting and rejecting her statement. Pt was able to challenge her negative thinking and found alternatives. Encouraged patient to continue identifying distorted thinking and challenging thoughts in similar exercises. She verbalized agreement.

## 2022-03-17 NOTE — BH INPATIENT PSYCHIATRY PROGRESS NOTE - CURRENT MEDICATION
MEDICATIONS  (STANDING):  aspirin enteric coated 81 milliGRAM(s) Oral daily  influenza  Vaccine (HIGH DOSE) 0.7 milliLiter(s) IntraMuscular once  lamoTRIgine 150 milliGRAM(s) Oral <User Schedule>  levothyroxine 25 MICROGram(s) Oral daily  LORazepam     Tablet 0.5 milliGRAM(s) Oral <User Schedule>  QUEtiapine 200 milliGRAM(s) Oral at bedtime  senna 2 Tablet(s) Oral at bedtime  sertraline 25 milliGRAM(s) Oral daily    MEDICATIONS  (PRN):  nicotine  Polacrilex Gum 2 milliGRAM(s) Oral every 2 hours PRN nicotine craving   MEDICATIONS  (STANDING):  aspirin enteric coated 81 milliGRAM(s) Oral daily  influenza  Vaccine (HIGH DOSE) 0.7 milliLiter(s) IntraMuscular once  lamoTRIgine 150 milliGRAM(s) Oral <User Schedule>  levothyroxine 25 MICROGram(s) Oral daily  LORazepam     Tablet 0.25 milliGRAM(s) Oral daily  LORazepam     Tablet 0.5 milliGRAM(s) Oral <User Schedule>  QUEtiapine 200 milliGRAM(s) Oral at bedtime  senna 2 Tablet(s) Oral at bedtime  sertraline 25 milliGRAM(s) Oral daily    MEDICATIONS  (PRN):  nicotine  Polacrilex Gum 2 milliGRAM(s) Oral every 2 hours PRN nicotine craving

## 2022-03-17 NOTE — PROGRESS NOTE ADULT - ASSESSMENT
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

## 2022-03-17 NOTE — BH INPATIENT PSYCHIATRY PROGRESS NOTE - NSBHMETABOLIC_PSY_ALL_CORE_FT
BMI: BMI (kg/m2): 24.9 (03-10-22 @ 15:37)  HbA1c: A1C with Estimated Average Glucose Result: 4.8 % (03-05-22 @ 10:01)    Glucose: POCT Blood Glucose.: 113 mg/dL (03-02-22 @ 19:36)    BP: 120/66 (03-17-22 @ 15:24) (95/57 - 120/66)  Lipid Panel: Date/Time: 03-05-22 @ 10:39  Cholesterol, Serum: 148  Direct LDL: --  HDL Cholesterol, Serum: 45  Total Cholesterol/HDL Ration Measurement: --  Triglycerides, Serum: 98   BMI: BMI (kg/m2): 24.9 (03-10-22 @ 15:37)  HbA1c: A1C with Estimated Average Glucose Result: 4.8 % (03-05-22 @ 10:01)    Glucose: POCT Blood Glucose.: 113 mg/dL (03-02-22 @ 19:36)    BP: 119/85 (03-18-22 @ 12:30) (95/57 - 154/100)  Lipid Panel: Date/Time: 03-05-22 @ 10:39  Cholesterol, Serum: 148  Direct LDL: --  HDL Cholesterol, Serum: 45  Total Cholesterol/HDL Ration Measurement: --  Triglycerides, Serum: 98

## 2022-03-17 NOTE — BH INPATIENT PSYCHIATRY PROGRESS NOTE - NSBHASSESSSUMMFT_PSY_ALL_CORE
71-year-old woman with documented PPH of Bipolar 2 disorder (though questionable if hx is consistent with ahsan/hypomania), 2 prior suicide attempts (via OD), 2 psych admissions (1987, 1992), no h/o substance abuse, recently switched outpatient providers after her psychiatrist of 30 years (Dr. Kong) retired in late 2021, now transferred to [facility] from [facility] for management of depression after being stabilized for AMS after patient overdosed on 90 tabs of Ativan and several tabs of Lamictal and Seroquel iso 1 year of low mood, anxiety, feelings of worthlessness, hopelessness, and overwhelming loneliness. Patient has had ECT in the past, aim is to restart ECT during current hospitalization. Ms. Salazar appears to be unhappy with her new outpatient treatment team, idealizing Dr. Kogn and describing her new psychiatrist and therapist as dismissive and uncaring. Ms. Salazar was tearful while sharing her story but expressed a sense of hope that with ECT she will once again feel like herself again.    Ms Salazar lives alone, she is , has no children, and is retired . She still volunteers sometimes in school and identifies her brother as her main source of support.    Patient remains adherent with medications without reported side effects. Dental consult today for ECT clearance. Today patient reports persistent depressed mood and negative cognitive framework; writer counseled patient on behavioral activation and again encouraged patient to work on identifying cognitive distortions. She denies active SIIP and contracts for safety.    1. Legal: 9.27 admission  2. Safety: routine observation  3. Psychiatric:   -D/c AM Lamictal dose in anticipation of ECT, continue Lamictal 150mg QHS  -Will hold hs Lamictal the night before ECT (hold Sun, Tues, and Thurs evenings once she starts ECT)  -Continue Seroquel 200mg QHS  -Continue Ativan taper, last decreased on 3/10 - currently on 0.25mg qAM, 0.5mg at 3PM, 0.5mg at 9PM  -Will discuss initiation of SSRI with patient   4. Medical:   -Continue Synthroid 25mg daily, senna qhs, ASA 81mg daily  -Pt with elevation of TSH to 7.00 (up from 4.05) and bump in LFTs during medical hospitalization; plan to recheck with routine labs 71-year-old woman with documented PPH of Bipolar 2 disorder (though questionable if hx is consistent with ahsan/hypomania), 2 prior suicide attempts (via OD), 2 psych admissions (1987, 1992), no h/o substance abuse, recently switched outpatient providers after her psychiatrist of 30 years (Dr. Kong) retired in late 2021, now transferred to [facility] from [facility] for management of depression after being stabilized for AMS after patient overdosed on 90 tabs of Ativan and several tabs of Lamictal and Seroquel iso 1 year of low mood, anxiety, feelings of worthlessness, hopelessness, and overwhelming loneliness. Patient has had ECT in the past, aim is to restart ECT during current hospitalization. Ms. Salazar appears to be unhappy with her new outpatient treatment team, idealizing Dr. Kong and describing her new psychiatrist and therapist as dismissive and uncaring. Ms. Salazar was tearful while sharing her story but expressed a sense of hope that with ECT she will once again feel like herself again.    Ms Salazar lives alone, she is , has no children, and is retired . She still volunteers sometimes in school and identifies her brother as her main source of support.    Patient remains adherent with medications without reported side effects. Starting ECT tomorrow 3/18.     1. Legal: 9.27 admission  2. Safety: routine observation  3. Psychiatric:   -D/c AM Lamictal dose in anticipation of ECT, continue Lamictal 150mg QHS  -Will hold hs Lamictal the night before ECT (hold Sun, Tues, and Thurs evenings once she starts ECT)  -Continue Seroquel 200mg QHS  -Continue Ativan taper, last decreased on 3/10 - currently on 0.25mg qAM, 0.5mg at 3PM, 0.5mg at 9PM  -Will discuss initiation of SSRI with patient   4. Medical:   -Continue Synthroid 25mg daily, senna qhs, ASA 81mg daily  -Pt with elevation of TSH to 7.00 (up from 4.05) and bump in LFTs during medical hospitalization; plan to recheck with routine labs

## 2022-03-17 NOTE — PROGRESS NOTE ADULT - SUBJECTIVE AND OBJECTIVE BOX
Podiatry pager #: [phone]/ [phone]    Patient is a 71y old  Female who presents with a chief complaint of Depression, Suicide attempt via Intentional Overdose (11 Mar 2022 10:23)  Podiatry consulted for painful ingrown toenails of both feet aggrevated by shoegear ambulaiton and palpation    HPI:      PAST MEDICAL & SURGICAL HISTORY:  Anxiety and depression    No significant past surgical history        MEDICATIONS  (STANDING):  aspirin enteric coated 81 milliGRAM(s) Oral daily  influenza  Vaccine (HIGH DOSE) 0.7 milliLiter(s) IntraMuscular once  lamoTRIgine 150 milliGRAM(s) Oral <User Schedule>  levothyroxine 25 MICROGram(s) Oral daily  LORazepam     Tablet 0.25 milliGRAM(s) Oral daily  LORazepam     Tablet 0.5 milliGRAM(s) Oral <User Schedule>  QUEtiapine 200 milliGRAM(s) Oral at bedtime  senna 2 Tablet(s) Oral at bedtime  sertraline 50 milliGRAM(s) Oral daily    MEDICATIONS  (PRN):  nicotine  Polacrilex Gum 2 milliGRAM(s) Oral every 2 hours PRN nicotine craving      Allergies    sulfa drugs (Unknown)    Intolerances        VITALS:    Vital Signs Last 24 Hrs  T(C): 36.5 (19 Mar 2022 07:47), Max: 36.5 (19 Mar 2022 07:47)  T(F): 97.7 (19 Mar 2022 07:47), Max: 97.7 (19 Mar 2022 07:47)  HR: --  BP: --  BP(mean): --  RR: --  SpO2: --    LABS:                CAPILLARY BLOOD GLUCOSE              LOWER EXTREMITY PHYSICAL EXAM:    Vasular: DP/PT 1_/4, B/L, CFT <_ 2seconds B/L, Temperature gradient wnl , B/L.   Neuro: Epicritic sensation _intact  to the level of toes_, B/L.  Skin:  positive dsytrophic, ingrown/incurvated toenails with subungual debris x10 toes. No ulcerations or clinical signs of infection    RADIOLOGY & ADDITIONAL STUDIES:

## 2022-03-18 PROCEDURE — 90870 ELECTROCONVULSIVE THERAPY: CPT

## 2022-03-18 PROCEDURE — 99232 SBSQ HOSP IP/OBS MODERATE 35: CPT | Mod: 25

## 2022-03-18 RX ORDER — MIDAZOLAM HYDROCHLORIDE 1 MG/ML
2 INJECTION, SOLUTION INTRAMUSCULAR; INTRAVENOUS ONCE
Refills: 0 | Status: DISCONTINUED | OUTPATIENT
Start: 2022-03-18 | End: 2022-03-18

## 2022-03-18 RX ORDER — SERTRALINE 25 MG/1
50 TABLET, FILM COATED ORAL DAILY
Refills: 0 | Status: DISCONTINUED | OUTPATIENT
Start: 2022-03-18 | End: 2022-03-22

## 2022-03-18 RX ORDER — FLUMAZENIL 0.1 MG/ML
0.2 VIAL (ML) INTRAVENOUS ONCE
Refills: 0 | Status: COMPLETED | OUTPATIENT
Start: 2022-03-18 | End: 2022-03-18

## 2022-03-18 RX ADMIN — Medication 81 MILLIGRAM(S): at 09:20

## 2022-03-18 RX ADMIN — LAMOTRIGINE 150 MILLIGRAM(S): 25 TABLET, ORALLY DISINTEGRATING ORAL at 20:55

## 2022-03-18 RX ADMIN — Medication 0.5 MILLIGRAM(S): at 15:01

## 2022-03-18 RX ADMIN — Medication 0.2 MILLIGRAM(S): at 11:43

## 2022-03-18 RX ADMIN — SERTRALINE 25 MILLIGRAM(S): 25 TABLET, FILM COATED ORAL at 09:20

## 2022-03-18 RX ADMIN — Medication 0.25 MILLIGRAM(S): at 09:19

## 2022-03-18 RX ADMIN — Medication 25 MICROGRAM(S): at 09:20

## 2022-03-18 RX ADMIN — QUETIAPINE FUMARATE 200 MILLIGRAM(S): 200 TABLET, FILM COATED ORAL at 20:55

## 2022-03-18 RX ADMIN — Medication 0.5 MILLIGRAM(S): at 20:56

## 2022-03-18 RX ADMIN — SENNA PLUS 2 TABLET(S): 8.6 TABLET ORAL at 20:56

## 2022-03-18 NOTE — BH INPATIENT PSYCHIATRY PROGRESS NOTE - CURRENT MEDICATION
MEDICATIONS  (STANDING):  aspirin enteric coated 81 milliGRAM(s) Oral daily  influenza  Vaccine (HIGH DOSE) 0.7 milliLiter(s) IntraMuscular once  lamoTRIgine 150 milliGRAM(s) Oral <User Schedule>  levothyroxine 25 MICROGram(s) Oral daily  LORazepam     Tablet 0.25 milliGRAM(s) Oral daily  LORazepam     Tablet 0.5 milliGRAM(s) Oral <User Schedule>  QUEtiapine 200 milliGRAM(s) Oral at bedtime  senna 2 Tablet(s) Oral at bedtime  sertraline 25 milliGRAM(s) Oral daily    MEDICATIONS  (PRN):  nicotine  Polacrilex Gum 2 milliGRAM(s) Oral every 2 hours PRN nicotine craving

## 2022-03-18 NOTE — BH INPATIENT PSYCHIATRY PROGRESS NOTE - NSBHFUPINTERVALHXFT_PSY_A_CORE
Patient seen for follow-up for anxiety, depression and recent overdose. Patient is adherent with all medications. No acute events or emergency PRNs required overnight. Pt scheduled for 1st ECT session today. Met with pt in the morning at bedside prior to ECT. She was calm and collected, denied any concerns or anxiety related to starting ECT. Pt expressed concerns about the future, in particular this weekend, stating she didn't know what to do with her time. Pt also expressed concern over the fact writer will not be in during the weekend. Writer provided reassurance and reminded pt that there will be covering MDs over the weekend that will come meet with her. Encouraged patient to use her time over the weekend to work on the homework provided by Dr. Rossi and to continue working on CBT techniques discussed during this week of labeling distorted thinking and challenging said thoughts with evidence, odds and alternative options. Pt verbalized agreement.

## 2022-03-18 NOTE — BH INPATIENT PSYCHIATRY PROGRESS NOTE - NSBHCHARTREVIEWVS_PSY_A_CORE FT
Vital Signs Last 24 Hrs  T(C): 36.8 (03-18-22 @ 12:30), Max: 36.9 (03-18-22 @ 06:29)  T(F): 98.2 (03-18-22 @ 12:30), Max: 98.4 (03-18-22 @ 06:29)  HR: 89 (03-18-22 @ 12:30) (78 - 95)  BP: 119/85 (03-18-22 @ 12:30) (104/76 - 154/100)  BP(mean): --  RR: 18 (03-18-22 @ 12:30) (16 - 19)  SpO2: 100% (03-18-22 @ 12:30) (98% - 100%)

## 2022-03-18 NOTE — BH CHART NOTE - NSPSYPRGNOTEFT_PSY_ALL_CORE
Patient repeatedly stopped psychologist in hallway requesting session. Psychologist reminded patient she had already been seen by psychology twice this week, with an additional brief check-in. Patient later interrupted a session with a patient to insist she be seen, reporting some concerns with anxiety. Psychologist encouraged patient to utilize coping skills she has learned as she has reported them to be effective in reducing her anxiety. Psychologist stated patient would be seen during their scheduled session next week. Patient then left to attend rehab therapy group. No acute indicators noted.

## 2022-03-18 NOTE — BH INPATIENT PSYCHIATRY PROGRESS NOTE - NSBHMETABOLIC_PSY_ALL_CORE_FT
BMI: BMI (kg/m2): 24.9 (03-10-22 @ 15:37)  HbA1c: A1C with Estimated Average Glucose Result: 4.8 % (03-05-22 @ 10:01)    Glucose: POCT Blood Glucose.: 113 mg/dL (03-02-22 @ 19:36)    BP: 119/85 (03-18-22 @ 12:30) (95/57 - 154/100)  Lipid Panel: Date/Time: 03-05-22 @ 10:39  Cholesterol, Serum: 148  Direct LDL: --  HDL Cholesterol, Serum: 45  Total Cholesterol/HDL Ration Measurement: --  Triglycerides, Serum: 98

## 2022-03-18 NOTE — BH INPATIENT PSYCHIATRY PROGRESS NOTE - NSBHASSESSSUMMFT_PSY_ALL_CORE
71-year-old woman with documented PPH of Bipolar 2 disorder (though questionable if hx is consistent with ahsan/hypomania), 2 prior suicide attempts (via OD), 2 psych admissions (1987, 1992), no h/o substance abuse, recently switched outpatient providers after her psychiatrist of 30 years (Dr. Kong) retired in late 2021, now transferred to [facility] from [facility] for management of depression after being stabilized for AMS after patient overdosed on 90 tabs of Ativan and several tabs of Lamictal and Seroquel iso 1 year of low mood, anxiety, feelings of worthlessness, hopelessness, and overwhelming loneliness. Patient has had ECT in the past, aim is to restart ECT during current hospitalization. Ms. Salazar appears to be unhappy with her new outpatient treatment team, idealizing Dr. Kong and describing her new psychiatrist and therapist as dismissive and uncaring. Ms. Salazar was tearful while sharing her story but expressed a sense of hope that with ECT she will once again feel like herself again.    Ms Salazar lives alone, she is , has no children, and is retired . She still volunteers sometimes in school and identifies her brother as her main source of support.    Pt had 1st ECT today 3/18. Taking medications, denies side effects. Continues to express anxiety about the future. Thought content continues to be grossly negative with hopelessness and negative self image.     1. Legal: 9.27 admission  2. Safety: routine observation  3. Psychiatric:   -D/c AM Lamictal dose in anticipation of ECT, continue Lamictal 150mg QHS  -Will hold hs Lamictal the night before ECT (hold Sun, Tues, and Thurs evenings once she starts ECT)  -Continue Seroquel 200mg QHS  -Continue Ativan taper, last decreased on 3/10 - currently on 0.25mg qAM, 0.5mg at 3PM, 0.5mg at 9PM  -Started Sertraline this week, titrating to 50mg daily.   4. Medical:   -Continue Synthroid 25mg daily, senna qhs, ASA 81mg daily  -Pt with elevation of TSH to 7.00 (up from 4.05) and bump in LFTs during medical hospitalization; plan to recheck with routine labs

## 2022-03-19 RX ORDER — ACETAMINOPHEN 500 MG
650 TABLET ORAL ONCE
Refills: 0 | Status: COMPLETED | OUTPATIENT
Start: 2022-03-19 | End: 2022-03-19

## 2022-03-19 RX ADMIN — Medication 650 MILLIGRAM(S): at 20:55

## 2022-03-19 RX ADMIN — Medication 0.5 MILLIGRAM(S): at 20:23

## 2022-03-19 RX ADMIN — Medication 650 MILLIGRAM(S): at 20:25

## 2022-03-19 RX ADMIN — SERTRALINE 50 MILLIGRAM(S): 25 TABLET, FILM COATED ORAL at 09:42

## 2022-03-19 RX ADMIN — SENNA PLUS 2 TABLET(S): 8.6 TABLET ORAL at 20:23

## 2022-03-19 RX ADMIN — LAMOTRIGINE 150 MILLIGRAM(S): 25 TABLET, ORALLY DISINTEGRATING ORAL at 20:23

## 2022-03-19 RX ADMIN — Medication 81 MILLIGRAM(S): at 09:42

## 2022-03-19 RX ADMIN — Medication 25 MICROGRAM(S): at 09:42

## 2022-03-19 RX ADMIN — QUETIAPINE FUMARATE 200 MILLIGRAM(S): 200 TABLET, FILM COATED ORAL at 20:23

## 2022-03-19 RX ADMIN — Medication 0.25 MILLIGRAM(S): at 09:42

## 2022-03-19 RX ADMIN — Medication 0.5 MILLIGRAM(S): at 14:23

## 2022-03-19 NOTE — BH INPATIENT PSYCHIATRY PROGRESS NOTE - NSBHCHARTREVIEWVS_PSY_A_CORE FT
Vital Signs Last 24 Hrs  T(C): 36.5 (03-19-22 @ 07:47), Max: 36.8 (03-18-22 @ 12:30)  T(F): 97.7 (03-19-22 @ 07:47), Max: 98.2 (03-18-22 @ 12:30)  HR: 89 (03-18-22 @ 12:30) (78 - 95)  BP: 119/85 (03-18-22 @ 12:30) (119/85 - 154/100)  BP(mean): --  RR: 18 (03-18-22 @ 12:30) (16 - 19)  SpO2: 100% (03-18-22 @ 12:30) (98% - 100%)    Orthostatic VS  03-19-22 @ 07:47  Lying BP: --/-- HR: --  Sitting BP: 104/71 HR: 83  Standing BP: --/-- HR: --  Site: --  Mode: --

## 2022-03-19 NOTE — BH INPATIENT PSYCHIATRY PROGRESS NOTE - NSBHFUPINTERVALHXFT_PSY_A_CORE
Patient seen for follow-up for anxiety, depression and recent overdose. Patient is adherent with all medications. No acute events or emergency PRNs required overnight. Pt scheduled for 1st ECT session today. Met with pt in the morning at bedside prior to ECT. She was calm and collected, denied any concerns or anxiety related to starting ECT. Pt expressed concerns about the future, in particular this weekend, stating she didn't know what to do with her time. Pt also expressed concern over the fact writer will not be in during the weekend. Writer provided reassurance and reminded pt that there will be covering MDs over the weekend that will come meet with her. Encouraged patient to use her time over the weekend to work on the homework provided by Dr. Rossi and to continue working on CBT techniques discussed during this week of labeling distorted thinking and challenging said thoughts with evidence, odds and alternative options. Pt verbalized agreement.   3/19 Patient seen for depression. No overnight events. Tolerated ECT well. Denies HA, nausea. VSS

## 2022-03-19 NOTE — BH INPATIENT PSYCHIATRY PROGRESS NOTE - MSE UNSTRUCTURED FT
Patient is awake and alert. Casually dressed, appropriate grooming and hygiene. Mild PMR. No PMA or other abnormal movements. Speech fluent. Mood is "depressed." Affect is sad and anxious, constricted, mood congruent. TP coherent, logical, overinclusive.  No delusions. No perceptual disturbance. Denies any thoughts of hurting herself. Insight and judgment limited.Oriented x3

## 2022-03-19 NOTE — BH INPATIENT PSYCHIATRY PROGRESS NOTE - CURRENT MEDICATION
MEDICATIONS  (STANDING):  aspirin enteric coated 81 milliGRAM(s) Oral daily  influenza  Vaccine (HIGH DOSE) 0.7 milliLiter(s) IntraMuscular once  lamoTRIgine 150 milliGRAM(s) Oral <User Schedule>  levothyroxine 25 MICROGram(s) Oral daily  LORazepam     Tablet 0.25 milliGRAM(s) Oral daily  LORazepam     Tablet 0.5 milliGRAM(s) Oral <User Schedule>  QUEtiapine 200 milliGRAM(s) Oral at bedtime  senna 2 Tablet(s) Oral at bedtime  sertraline 50 milliGRAM(s) Oral daily    MEDICATIONS  (PRN):  nicotine  Polacrilex Gum 2 milliGRAM(s) Oral every 2 hours PRN nicotine craving

## 2022-03-19 NOTE — BH INPATIENT PSYCHIATRY PROGRESS NOTE - NSBHASSESSSUMMFT_PSY_ALL_CORE
71-year-old woman with documented PPH of Bipolar 2 disorder (though questionable if hx is consistent with ahsan/hypomania), 2 prior suicide attempts (via OD), 2 psych admissions (1987, 1992), no h/o substance abuse, recently switched outpatient providers after her psychiatrist of 30 years (Dr. Kong) retired in late 2021, now transferred to [facility] from [facility] for management of depression after being stabilized for AMS after patient overdosed on 90 tabs of Ativan and several tabs of Lamictal and Seroquel iso 1 year of low mood, anxiety, feelings of worthlessness, hopelessness, and overwhelming loneliness. Patient has had ECT in the past, aim is to restart ECT during current hospitalization. Ms. Salazar appears to be unhappy with her new outpatient treatment team, idealizing Dr. Kong and describing her new psychiatrist and therapist as dismissive and uncaring. Ms. Salazar was tearful while sharing her story but expressed a sense of hope that with ECT she will once again feel like herself again.    Ms Salazar lives alone, she is , has no children, and is retired . She still volunteers sometimes in school and identifies her brother as her main source of support.    Pt had 1st ECT today 3/18. Taking medications, denies side effects. Continues to express anxiety about the future. Thought content continues to be grossly negative with hopelessness and negative self image.     1. Legal: 9.27 admission  2. Safety: routine observation  3. Psychiatric:   -D/c AM Lamictal dose in anticipation of ECT, continue Lamictal 150mg QHS  -Will hold hs Lamictal the night before ECT (hold Sun, Tues, and Thurs evenings once she starts ECT)  -Continue Seroquel 200mg QHS  -Continue Ativan taper, last decreased on 3/10 - currently on 0.25mg qAM, 0.5mg at 3PM, 0.5mg at 9PM  -Started Sertraline this week, titrating to 50mg daily.   4. Medical:   -Continue Synthroid 25mg daily, senna qhs, ASA 81mg daily  -Pt with elevation of TSH to 7.00 (up from 4.05) and bump in LFTs during medical hospitalization; plan to recheck with routine labs  3/19 Depressed, tolerated ECT, cont ECT 3/21 Cont other meds

## 2022-03-20 LAB — SARS-COV-2 RNA SPEC QL NAA+PROBE: SIGNIFICANT CHANGE UP

## 2022-03-20 PROCEDURE — 99231 SBSQ HOSP IP/OBS SF/LOW 25: CPT

## 2022-03-20 RX ADMIN — Medication 0.5 MILLIGRAM(S): at 21:05

## 2022-03-20 RX ADMIN — Medication 25 MICROGRAM(S): at 09:59

## 2022-03-20 RX ADMIN — QUETIAPINE FUMARATE 200 MILLIGRAM(S): 200 TABLET, FILM COATED ORAL at 21:05

## 2022-03-20 RX ADMIN — SERTRALINE 50 MILLIGRAM(S): 25 TABLET, FILM COATED ORAL at 09:59

## 2022-03-20 RX ADMIN — Medication 0.25 MILLIGRAM(S): at 09:59

## 2022-03-20 RX ADMIN — Medication 81 MILLIGRAM(S): at 09:59

## 2022-03-20 RX ADMIN — Medication 0.5 MILLIGRAM(S): at 15:15

## 2022-03-20 NOTE — BH INPATIENT PSYCHIATRY PROGRESS NOTE - MSE UNSTRUCTURED FT
Patient is awake and alert. Casually dressed, appropriate grooming and hygiene. Mild PMR. No PMA or other abnormal movements. Speech fluent. Mood is "depressed." Affect is sad and anxious, constricted, mood congruent. TP coherent, logical, overinclusive.  No delusions. No perceptual disturbance. Denies any thoughts of hurting herself. Expresses guilt over OD Insight and judgment limited.Oriented x3

## 2022-03-20 NOTE — BH INPATIENT PSYCHIATRY PROGRESS NOTE - NSBHASSESSSUMMFT_PSY_ALL_CORE
71-year-old woman with documented PPH of Bipolar 2 disorder (though questionable if hx is consistent with ahsan/hypomania), 2 prior suicide attempts (via OD), 2 psych admissions (1987, 1992), no h/o substance abuse, recently switched outpatient providers after her psychiatrist of 30 years (Dr. Kong) retired in late 2021, now transferred to [facility] from [facility] for management of depression after being stabilized for AMS after patient overdosed on 90 tabs of Ativan and several tabs of Lamictal and Seroquel iso 1 year of low mood, anxiety, feelings of worthlessness, hopelessness, and overwhelming loneliness. Patient has had ECT in the past, aim is to restart ECT during current hospitalization. Ms. Salazar appears to be unhappy with her new outpatient treatment team, idealizing Dr. Kong and describing her new psychiatrist and therapist as dismissive and uncaring. Ms. Salazar was tearful while sharing her story but expressed a sense of hope that with ECT she will once again feel like herself again.    Ms Salazar lives alone, she is , has no children, and is retired . She still volunteers sometimes in school and identifies her brother as her main source of support.    Pt had 1st ECT today 3/18. Taking medications, denies side effects. Continues to express anxiety about the future. Thought content continues to be grossly negative with hopelessness and negative self image.     1. Legal: 9.27 admission  2. Safety: routine observation  3. Psychiatric:   -D/c AM Lamictal dose in anticipation of ECT, continue Lamictal 150mg QHS  -Will hold hs Lamictal the night before ECT (hold Sun, Tues, and Thurs evenings once she starts ECT)  -Continue Seroquel 200mg QHS  -Continue Ativan taper, last decreased on 3/10 - currently on 0.25mg qAM, 0.5mg at 3PM, 0.5mg at 9PM  -Started Sertraline this week, titrating to 50mg daily.   4. Medical:   -Continue Synthroid 25mg daily, senna qhs, ASA 81mg daily  -Pt with elevation of TSH to 7.00 (up from 4.05) and bump in LFTs during medical hospitalization; plan to recheck with routine labs  3/19 Depressed, tolerated ECT, cont ECT 3/21 Cont other meds  3/20 Depressed anxious, no SI. Cont ECT will communicate above to psychologist

## 2022-03-20 NOTE — BH INPATIENT PSYCHIATRY PROGRESS NOTE - NSBHMETABOLIC_PSY_ALL_CORE_FT
BMI: BMI (kg/m2): 24.9 (03-10-22 @ 15:37)  HbA1c: A1C with Estimated Average Glucose Result: 4.8 % (03-05-22 @ 10:01)    Glucose: POCT Blood Glucose.: 113 mg/dL (03-02-22 @ 19:36)    BP: 98/70 (03-20-22 @ 08:42) (98/70 - 154/100)  Lipid Panel: Date/Time: 03-05-22 @ 10:39  Cholesterol, Serum: 148  Direct LDL: --  HDL Cholesterol, Serum: 45  Total Cholesterol/HDL Ration Measurement: --  Triglycerides, Serum: 98

## 2022-03-20 NOTE — BH INPATIENT PSYCHIATRY PROGRESS NOTE - NSBHCHARTREVIEWVS_PSY_A_CORE FT
Vital Signs Last 24 Hrs  T(C): 36.7 (03-20-22 @ 08:42), Max: 36.7 (03-20-22 @ 08:42)  T(F): 98 (03-20-22 @ 08:42), Max: 98 (03-20-22 @ 08:42)  HR: --  BP: 98/70 (03-20-22 @ 08:42) (98/70 - 98/70)  BP(mean): 75 (03-20-22 @ 08:42) (75 - 75)  RR: --  SpO2: --    Orthostatic VS  03-19-22 @ 07:47  Lying BP: --/-- HR: --  Sitting BP: 104/71 HR: 83  Standing BP: --/-- HR: --  Site: --  Mode: --

## 2022-03-20 NOTE — BH INPATIENT PSYCHIATRY PROGRESS NOTE - NSBHFUPINTERVALHXFT_PSY_A_CORE
Patient seen for follow-up for anxiety, depression and recent overdose. Patient is adherent with all medications. No acute events or emergency PRNs required overnight. Pt scheduled for 1st ECT session today. Met with pt in the morning at bedside prior to ECT. She was calm and collected, denied any concerns or anxiety related to starting ECT. Pt expressed concerns about the future, in particular this weekend, stating she didn't know what to do with her time. Pt also expressed concern over the fact writer will not be in during the weekend. Writer provided reassurance and reminded pt that there will be covering MDs over the weekend that will come meet with her. Encouraged patient to use her time over the weekend to work on the homework provided by Dr. Rossi and to continue working on CBT techniques discussed during this week of labeling distorted thinking and challenging said thoughts with evidence, odds and alternative options. Pt verbalized agreement.   3/19 Patient seen for depression. No overnight events. Tolerated ECT well. Denies HA, nausea. VSS  3/20 Patient seen for depression, OD n overnightr events. Eating, sleeping fair.

## 2022-03-21 PROCEDURE — 90837 PSYTX W PT 60 MINUTES: CPT

## 2022-03-21 PROCEDURE — 90870 ELECTROCONVULSIVE THERAPY: CPT

## 2022-03-21 PROCEDURE — 99231 SBSQ HOSP IP/OBS SF/LOW 25: CPT | Mod: 25

## 2022-03-21 RX ORDER — FLUMAZENIL 0.1 MG/ML
0.2 VIAL (ML) INTRAVENOUS ONCE
Refills: 0 | Status: COMPLETED | OUTPATIENT
Start: 2022-03-21 | End: 2022-03-21

## 2022-03-21 RX ADMIN — Medication 25 MICROGRAM(S): at 12:59

## 2022-03-21 RX ADMIN — Medication 0.5 MILLIGRAM(S): at 21:01

## 2022-03-21 RX ADMIN — Medication 0.5 MILLIGRAM(S): at 14:48

## 2022-03-21 RX ADMIN — SERTRALINE 50 MILLIGRAM(S): 25 TABLET, FILM COATED ORAL at 12:59

## 2022-03-21 RX ADMIN — SENNA PLUS 2 TABLET(S): 8.6 TABLET ORAL at 21:01

## 2022-03-21 RX ADMIN — QUETIAPINE FUMARATE 200 MILLIGRAM(S): 200 TABLET, FILM COATED ORAL at 21:01

## 2022-03-21 RX ADMIN — Medication 81 MILLIGRAM(S): at 12:57

## 2022-03-21 RX ADMIN — LAMOTRIGINE 150 MILLIGRAM(S): 25 TABLET, ORALLY DISINTEGRATING ORAL at 21:01

## 2022-03-21 RX ADMIN — Medication 0.2 MILLIGRAM(S): at 10:49

## 2022-03-21 NOTE — BH INPATIENT PSYCHIATRY PROGRESS NOTE - NSBHFUPINTERVALHXFT_PSY_A_CORE
Patient seen for follow-up for anxiety, depression and recent overdose. Patient is adherent with all medications. No significant events reported over the weekend. No PRNs needed over the weekend. Pt continues to express feeling hopeless about the future and questioning whether treatment will work. Pt expressed reservations about her work with her psychologist. Writer encouraged her to address these concerns with her psychologist and to be open during her sessions with him. Writer set and discussed boundaries and the roles of each treatment provider. Pt verbalized understanding. Pt tolerating ECT well. Will continue ECT 3x week and continue titrating Sertraline and tapering Clonazepam as tolerated.

## 2022-03-21 NOTE — BH INPATIENT PSYCHIATRY PROGRESS NOTE - NSBHMETABOLIC_PSY_ALL_CORE_FT
BMI: BMI (kg/m2): 24.9 (03-10-22 @ 15:37)  HbA1c: A1C with Estimated Average Glucose Result: 4.8 % (03-05-22 @ 10:01)    Glucose: POCT Blood Glucose.: 113 mg/dL (03-02-22 @ 19:36)    BP: 104/58 (03-21-22 @ 11:40) (98/70 - 135/78)  Lipid Panel: Date/Time: 03-05-22 @ 10:39  Cholesterol, Serum: 148  Direct LDL: --  HDL Cholesterol, Serum: 45  Total Cholesterol/HDL Ration Measurement: --  Triglycerides, Serum: 98

## 2022-03-21 NOTE — BH INPATIENT PSYCHIATRY PROGRESS NOTE - NSBHASSESSSUMMFT_PSY_ALL_CORE
71-year-old woman with documented PPH of Bipolar 2 disorder (though questionable if hx is consistent with ahsan/hypomania), 2 prior suicide attempts (via OD), 2 psych admissions (1987, 1992), no h/o substance abuse, recently switched outpatient providers after her psychiatrist of 30 years (Dr. Kong) retired in late 2021, now transferred to [facility] from [facility] for management of depression after being stabilized for AMS after patient overdosed on 90 tabs of Ativan and several tabs of Lamictal and Seroquel iso 1 year of low mood, anxiety, feelings of worthlessness, hopelessness, and overwhelming loneliness. Patient has had ECT in the past, aim is to restart ECT during current hospitalization. Ms. Salazar appears to be unhappy with her new outpatient treatment team, idealizing Dr. Kong and describing her new psychiatrist and therapist as dismissive and uncaring. Ms. Salazar was tearful while sharing her story but expressed a sense of hope that with ECT she will once again feel like herself again.    Ms Salazar lives alone, she is , has no children, and is retired . She still volunteers sometimes in school and identifies her brother as her main source of support.    Pt had 1st ECT today 3/18. Taking medications, denies side effects. Continues to express anxiety about the future. Thought content continues to be grossly negative with hopelessness and negative self image.     1. Legal: 9.27 admission  2. Safety: routine observation  3. Psychiatric:   -D/c AM Lamictal dose in anticipation of ECT, continue Lamictal 150mg QHS  -Will hold hs Lamictal the night before ECT (hold Sun, Tues, and Thurs evenings once she starts ECT)  -Continue Seroquel 200mg QHS  -Continue Ativan taper, last decreased on 3/10 - currently on 0.25mg qAM, 0.5mg at 3PM, 0.5mg at 9PM  -Started Sertraline this week, titrating to 50mg daily.   4. Medical:   -Continue Synthroid 25mg daily, senna qhs, ASA 81mg daily  -Pt with elevation of TSH to 7.00 (up from 4.05) and bump in LFTs during medical hospitalization; plan to recheck with routine labs  3/19 Depressed, tolerated ECT, cont ECT 3/21 Cont other meds  3/20 Depressed anxious, no SI. Cont ECT will communicate above to psychologist  3/21: Patient continues to be mostly withdrawn. Continues to present overly catastrophic and negative thinking. Case discussed with rest of treatment team. Will continue ECT 3x/ week. Will continue titrating Sertraline and tapering klonopin as tolerated.

## 2022-03-21 NOTE — BH INPATIENT PSYCHIATRY PROGRESS NOTE - NSBHCHARTREVIEWVS_PSY_A_CORE FT
Vital Signs Last 24 Hrs  T(C): 36.1 (03-21-22 @ 11:40), Max: 36.7 (03-21-22 @ 07:00)  T(F): 97 (03-21-22 @ 11:40), Max: 98 (03-21-22 @ 07:00)  HR: 78 (03-21-22 @ 11:40) (72 - 89)  BP: 104/58 (03-21-22 @ 11:40) (104/58 - 135/78)  BP(mean): --  RR: 16 (03-21-22 @ 11:40) (16 - 18)  SpO2: 100% (03-21-22 @ 11:40) (98% - 100%)    Orthostatic VS  03-21-22 @ 07:00  Lying BP: --/-- HR: --  Sitting BP: 119/81 HR: 69  Standing BP: --/-- HR: --  Site: --  Mode: --

## 2022-03-21 NOTE — ECT TREATMENT NOTE - NSECTPOSTTXCOMMENTS_PSY_ALL_CORE
SUGGEST INCREASED ENERGY AT NEXT TX
CONSIDER INCREASING STIMULUS DOSE next session (delayed motor onset and poor EEG)

## 2022-03-22 PROCEDURE — 99231 SBSQ HOSP IP/OBS SF/LOW 25: CPT

## 2022-03-22 RX ORDER — SERTRALINE 25 MG/1
100 TABLET, FILM COATED ORAL DAILY
Refills: 0 | Status: DISCONTINUED | OUTPATIENT
Start: 2022-03-22 | End: 2022-03-28

## 2022-03-22 RX ADMIN — QUETIAPINE FUMARATE 200 MILLIGRAM(S): 200 TABLET, FILM COATED ORAL at 20:50

## 2022-03-22 RX ADMIN — SERTRALINE 50 MILLIGRAM(S): 25 TABLET, FILM COATED ORAL at 09:16

## 2022-03-22 RX ADMIN — Medication 81 MILLIGRAM(S): at 09:16

## 2022-03-22 RX ADMIN — Medication 0.25 MILLIGRAM(S): at 09:16

## 2022-03-22 RX ADMIN — SENNA PLUS 2 TABLET(S): 8.6 TABLET ORAL at 20:50

## 2022-03-22 RX ADMIN — Medication 0.5 MILLIGRAM(S): at 20:50

## 2022-03-22 RX ADMIN — Medication 0.5 MILLIGRAM(S): at 15:18

## 2022-03-22 RX ADMIN — Medication 25 MICROGRAM(S): at 09:16

## 2022-03-22 NOTE — BH INPATIENT PSYCHIATRY PROGRESS NOTE - NSBHASSESSSUMMFT_PSY_ALL_CORE
71-year-old woman with documented PPH of Bipolar 2 disorder (though questionable if hx is consistent with ahsan/hypomania), 2 prior suicide attempts (via OD), 2 psych admissions (1987, 1992), no h/o substance abuse, recently switched outpatient providers after her psychiatrist of 30 years (Dr. Kong) retired in late 2021, now transferred to [facility] from [facility] for management of depression after being stabilized for AMS after patient overdosed on 90 tabs of Ativan and several tabs of Lamictal and Seroquel iso 1 year of low mood, anxiety, feelings of worthlessness, hopelessness, and overwhelming loneliness. Patient has had ECT in the past, aim is to restart ECT during current hospitalization. Ms. Salazar appears to be unhappy with her new outpatient treatment team, idealizing Dr. Kong and describing her new psychiatrist and therapist as dismissive and uncaring. Ms. Salazar was tearful while sharing her story but expressed a sense of hope that with ECT she will once again feel like herself again.    Ms Salazar lives alone, she is , has no children, and is retired . She still volunteers sometimes in school and identifies her brother as her main source of support.    Pt had 1st ECT today 3/18. Taking medications, denies side effects. Continues to express anxiety about the future. Thought content continues to be grossly negative with hopelessness and negative self image.     1. Legal: 9.27 admission  2. Safety: routine observation  3. Psychiatric:   -D/c AM Lamictal dose in anticipation of ECT, continue Lamictal 150mg QHS  -Will hold hs Lamictal the night before ECT (hold Sun, Tues, and Thurs evenings once she starts ECT)  -Continue Seroquel 200mg QHS  -Continue Ativan taper, last decreased on 3/10 - currently on 0.25mg qAM, 0.5mg at 3PM, 0.5mg at 9PM  -Started Sertraline this week, titrating to 50mg daily.   4. Medical:   -Continue Synthroid 25mg daily, senna qhs, ASA 81mg daily  -Pt with elevation of TSH to 7.00 (up from 4.05) and bump in LFTs during medical hospitalization; plan to recheck with routine labs  3/19 Depressed, tolerated ECT, cont ECT 3/21 Cont other meds  3/20 Depressed anxious, no SI. Cont ECT will communicate above to psychologist  3/21: Patient continues to be mostly withdrawn. Continues to present overly catastrophic and negative thinking. Case discussed with rest of treatment team. Will continue ECT 3x/ week. Will continue titrating Sertraline and tapering klonopin as tolerated.

## 2022-03-22 NOTE — BH INPATIENT PSYCHIATRY PROGRESS NOTE - CURRENT MEDICATION
MEDICATIONS  (STANDING):  aspirin enteric coated 81 milliGRAM(s) Oral daily  influenza  Vaccine (HIGH DOSE) 0.7 milliLiter(s) IntraMuscular once  lamoTRIgine 150 milliGRAM(s) Oral <User Schedule>  levothyroxine 25 MICROGram(s) Oral daily  LORazepam     Tablet 0.25 milliGRAM(s) Oral daily  LORazepam     Tablet 0.5 milliGRAM(s) Oral <User Schedule>  QUEtiapine 200 milliGRAM(s) Oral at bedtime  senna 2 Tablet(s) Oral at bedtime  sertraline 100 milliGRAM(s) Oral daily    MEDICATIONS  (PRN):  nicotine  Polacrilex Gum 2 milliGRAM(s) Oral every 2 hours PRN nicotine craving

## 2022-03-22 NOTE — BH INPATIENT PSYCHIATRY PROGRESS NOTE - NSBHCHARTREVIEWVS_PSY_A_CORE FT
Vital Signs Last 24 Hrs  T(C): --  T(F): --  HR: --  BP: --  BP(mean): --  RR: --  SpO2: --    Orthostatic VS  03-22-22 @ 08:45  Lying BP: --/-- HR: --  Sitting BP: 108/80 HR: 68  Standing BP: --/-- HR: --  Site: --  Mode: --  Orthostatic VS  03-21-22 @ 07:00  Lying BP: --/-- HR: --  Sitting BP: 119/81 HR: 69  Standing BP: --/-- HR: --  Site: --  Mode: --

## 2022-03-22 NOTE — BH CHART NOTE - NSPSYPRGNOTEFT_PSY_ALL_CORE
Patient stopped psychologist in hallway requesting a session as she felt a great deal of stress. Psychologist reminded her of the coping skills she both learned and reported as being effective in addressing her stress. Patient stated she would review coping skills. Patient was seen 2 minutes later laying in bed, not practicing coping skills. When brought to the patient's attention, the patient stated she had difficulty "coping on [her] own." Psychologist reinforced the patient's agency and ability to choose to engage in positive or negative coping strategies. Patient acknowledged her understanding. No acute indicators noted.    Writer provided updates to treatment team.

## 2022-03-22 NOTE — BH INPATIENT PSYCHIATRY PROGRESS NOTE - NSBHFUPINTERVALHXFT_PSY_A_CORE
Patient seen for follow-up for anxiety, depression and recent overdose. Patient is adherent with all medications. No significant events reported overnight. Ms. Salazar continues to express feeling hopeless about the future, concerned about how she will cope once she returns home and the possibility she might reattempt suicide at one point. Continues to ask writer about her work with psychology and what her "homework" should be. Writer spoke with patient's brother and updated him on patient's treatment plan. Brother planning to visit patient tonight. Pt tolerating ECT well. Will continue ECT 3x week and continue titrating Sertraline and tapering Clonazepam as tolerated.

## 2022-03-23 PROCEDURE — 93010 ELECTROCARDIOGRAM REPORT: CPT

## 2022-03-23 PROCEDURE — 99232 SBSQ HOSP IP/OBS MODERATE 35: CPT

## 2022-03-23 RX ADMIN — LAMOTRIGINE 150 MILLIGRAM(S): 25 TABLET, ORALLY DISINTEGRATING ORAL at 21:07

## 2022-03-23 RX ADMIN — SERTRALINE 100 MILLIGRAM(S): 25 TABLET, FILM COATED ORAL at 09:44

## 2022-03-23 RX ADMIN — Medication 0.25 MILLIGRAM(S): at 09:44

## 2022-03-23 RX ADMIN — Medication 25 MICROGRAM(S): at 09:44

## 2022-03-23 RX ADMIN — Medication 81 MILLIGRAM(S): at 09:44

## 2022-03-23 RX ADMIN — QUETIAPINE FUMARATE 200 MILLIGRAM(S): 200 TABLET, FILM COATED ORAL at 21:08

## 2022-03-23 RX ADMIN — Medication 0.5 MILLIGRAM(S): at 21:08

## 2022-03-23 RX ADMIN — SENNA PLUS 2 TABLET(S): 8.6 TABLET ORAL at 21:07

## 2022-03-23 RX ADMIN — Medication 0.5 MILLIGRAM(S): at 15:03

## 2022-03-23 NOTE — BH INPATIENT PSYCHIATRY PROGRESS NOTE - NSBHMETABOLIC_PSY_ALL_CORE_FT
BMI: BMI (kg/m2): 24.9 (03-10-22 @ 15:37)  HbA1c: A1C with Estimated Average Glucose Result: 4.8 % (03-05-22 @ 10:01)  Glucose: POCT Blood Glucose.: 113 mg/dL (03-02-22 @ 19:36)    BP: 104/58 (03-21-22 @ 11:40) (104/58 - 135/78)  Lipid Panel: Date/Time: 03-05-22 @ 10:39  Cholesterol, Serum: 148  Direct LDL: --  HDL Cholesterol, Serum: 45  Total Cholesterol/HDL Ration Measurement: --  Triglycerides, Serum: 98   BMI: BMI (kg/m2): 24.9 (03-10-22 @ 15:37)  HbA1c: A1C with Estimated Average Glucose Result: 4.8 % (03-05-22 @ 10:01)    Glucose: POCT Blood Glucose.: 113 mg/dL (03-02-22 @ 19:36)    BP: 104/58 (03-21-22 @ 11:40) (104/58 - 135/78)  Lipid Panel: Date/Time: 03-05-22 @ 10:39  Cholesterol, Serum: 148  Direct LDL: --  HDL Cholesterol, Serum: 45  Total Cholesterol/HDL Ration Measurement: --  Triglycerides, Serum: 98

## 2022-03-23 NOTE — BH CHART NOTE - NSEVENTNOTEFT_PSY_ALL_CORE
Medical Rapid Response called at 7:33am for hypotension. BP 87/50 sitting and 66/51 standing with . On exam, pt endorses some dizziness and fatigue while standing, but denies vision changes or confusion. A&Ox3, encouraged po fluid intake. Pt is NPO for ECT later this morning. Pt agrees to remain in bed unless staff is present to assist. No further intervention required at this time. Primary team present during MRR.      Physical exam:   HEENT: normocephalic, atraumatic, PEERLA  Cardiac: RRR, no M/R/G  Respiratory: LCTAB, no increased work of breathing  Skin: no cyanosis   Neuro: A&Ox3  Psych: calm and cooperative     Vitals:   BP: 87/50 sitting, 66/51 standing  HR: 112

## 2022-03-23 NOTE — BH INPATIENT PSYCHIATRY PROGRESS NOTE - CURRENT MEDICATION
MEDICATIONS  (STANDING):  aspirin enteric coated 81 milliGRAM(s) Oral daily  influenza  Vaccine (HIGH DOSE) 0.7 milliLiter(s) IntraMuscular once  lamoTRIgine 150 milliGRAM(s) Oral <User Schedule>  levothyroxine 25 MICROGram(s) Oral daily  LORazepam     Tablet 0.25 milliGRAM(s) Oral daily  LORazepam     Tablet 0.5 milliGRAM(s) Oral <User Schedule>  QUEtiapine 200 milliGRAM(s) Oral at bedtime  senna 2 Tablet(s) Oral at bedtime  sertraline 100 milliGRAM(s) Oral daily   MEDICATIONS  (STANDING):  aspirin enteric coated 81 milliGRAM(s) Oral daily  influenza  Vaccine (HIGH DOSE) 0.7 milliLiter(s) IntraMuscular once  lamoTRIgine 150 milliGRAM(s) Oral <User Schedule>  levothyroxine 25 MICROGram(s) Oral daily  LORazepam     Tablet 0.25 milliGRAM(s) Oral daily  LORazepam     Tablet 0.5 milliGRAM(s) Oral <User Schedule>  QUEtiapine 200 milliGRAM(s) Oral at bedtime  senna 2 Tablet(s) Oral at bedtime  sertraline 100 milliGRAM(s) Oral daily    MEDICATIONS  (PRN):  nicotine  Polacrilex Gum 2 milliGRAM(s) Oral every 2 hours PRN nicotine craving

## 2022-03-23 NOTE — BH INPATIENT PSYCHIATRY PROGRESS NOTE - MSE UNSTRUCTURED FT
Patient is awake and alert. Casually dressed, appropriate grooming and hygiene. Nails newly painted. No PMR/PMA or other abnormal movements. Speech fluent. Mood is "depressed." Affect is anxious, constricted, mood congruent. TP coherent, logical, overinclusive.  No delusions. No perceptual disturbance. Denies any thoughts of hurting herself. Expresses guilt over OD. Insight and judgment limited. Oriented x3

## 2022-03-23 NOTE — BH INPATIENT PSYCHIATRY PROGRESS NOTE - NSBHASSESSSUMMFT_PSY_ALL_CORE
71-year-old woman with documented PPH of Bipolar 2 disorder (though questionable if hx is consistent with ahsan/hypomania), 2 prior suicide attempts (via OD), 2 psych admissions (1987, 1992), no h/o substance abuse, recently switched outpatient providers after her psychiatrist of 30 years (Dr. Kong) retired in late 2021, now transferred to [facility] from [facility] for management of depression after being stabilized for AMS after patient overdosed on 90 tabs of Ativan and several tabs of Lamictal and Seroquel iso 1 year of low mood, anxiety, feelings of worthlessness, hopelessness, and overwhelming loneliness. Patient has had ECT in the past, aim is to restart ECT during current hospitalization. Ms. Salazar appears to be unhappy with her new outpatient treatment team, idealizing Dr. Kong and describing her new psychiatrist and therapist as dismissive and uncaring. Ms. Salazar was tearful while sharing her story but expressed a sense of hope that with ECT she will once again feel like herself again.    Ms Salazar lives alone, she is , has no children, and is retired . She still volunteers sometimes in school and identifies her brother as her main source of support.    Pt now s/p 2 ECT sessions (3/18, 3/21). ECT held today for hypotension this morning. Patient now with normal BPs, asymptomatic. Taking medications, denies side effects. Continues to express anxiety about the future. Thought content continues to be grossly negative with hopelessness and negative self image.     1. Legal: 9.27 admission  2. Safety: routine observation  3. Psychiatric:   -Continue Lamictal 150mg QHS, hold hs Lamictal the night before ECT (hold Sun, Tues, and Thurs evenings)  -Continue Seroquel 200mg QHS  -Continue Ativan taper, last decreased on 3/10 - currently on 0.25mg qAM, 0.5mg at 3PM, 0.5mg at 9PM  -Continue sertraline 100mg, titrate as tolerated  4. Medical:   -Continue Synthroid 25mg daily, senna qhs, ASA 81mg daily  -Pt with elevation of TSH to 7.00 (up from 4.05) and bump in LFTs during medical hospitalization; plan to recheck with routine labs    3/19 Depressed, tolerated ECT, cont ECT 3/21 Cont other meds  3/20 Depressed anxious, no SI. Cont ECT will communicate above to psychologist  3/21: Patient continues to be mostly withdrawn. Continues to present overly catastrophic and negative thinking. Case discussed with rest of treatment team. Will continue ECT 3x/ week. Will continue titrating Sertraline and tapering klonopin as tolerated.    3/23: Hold ECT for hypotension, pt now with normal vitals.

## 2022-03-23 NOTE — BH INPATIENT PSYCHIATRY PROGRESS NOTE - NSBHFUPINTERVALHXFT_PSY_A_CORE
Patient seen for follow-up for anxiety, depression and recent overdose. Patient is adherent with all medications. Patient had Medical Rapid Response called this morning for hypotension in the setting of being NPO for scheduled ECT today. Standing BP 60s/50s this morning, patient with mild lightheadedness, no other symptoms. She accepted several cups of water with improvement in symptoms. Repeat vitals wnl. Will plan to hold ECT for today and have next session on Friday.     Patient was interviewed today with attending psychiatrist, resident psychiatrist, and psychologist. Patient begins by saying "I think I've lost the will to live." She endorses difficulty using her coping skills but endorses hopefulness that she can improve. Team discussed the role of each member of treatment team to clarify with the patient who she should ask about various issues (ECT/med questions vs. therapy questions) and patient demonstrated understanding. Pt denies active SI/HI on the unit.

## 2022-03-23 NOTE — BH INPATIENT PSYCHIATRY PROGRESS NOTE - NSBHCHARTREVIEWVS_PSY_A_CORE FT
Vital Signs Last 24 Hrs  T(C): 36.6 (03-23-22 @ 08:12), Max: 36.6 (03-23-22 @ 08:12)  T(F): 97.9 (03-23-22 @ 08:12), Max: 97.9 (03-23-22 @ 08:12)    Orthostatic VS  03-23-22 @ 09:00  Lying BP: --/-- HR: --  Sitting BP: 108/86 HR: 92  Standing BP: 123/83 HR: 93  Site: --  Mode: --  Orthostatic VS  03-23-22 @ 08:12  Lying BP: --/-- HR: --  Sitting BP: 87/64 HR: 87  Standing BP: 63/51 HR: 112  Site: upper left arm  Mode: electronic  Orthostatic VS  03-23-22 @ 07:35  Lying BP: --/-- HR: --  Sitting BP: 82/61 HR: 80  Standing BP: 66/38 HR: 93  Site: --  Mode: --  Orthostatic VS  03-23-22 @ 07:30  Lying BP: --/-- HR: --  Sitting BP: 90/60 HR: 110  Standing BP: --/-- HR: --  Site: --  Mode: --  Orthostatic VS  03-22-22 @ 08:45  Lying BP: --/-- HR: --  Sitting BP: 108/80 HR: 68  Standing BP: --/-- HR: --  Site: --  Mode: --   none Vital Signs Last 24 Hrs  T(C): 36.6 (03-23-22 @ 08:12), Max: 36.6 (03-23-22 @ 08:12)  T(F): 97.9 (03-23-22 @ 08:12), Max: 97.9 (03-23-22 @ 08:12)  HR: --  BP: --  BP(mean): --  RR: --  SpO2: --    Orthostatic VS  03-23-22 @ 09:00  Lying BP: --/-- HR: --  Sitting BP: 108/86 HR: 92  Standing BP: 123/83 HR: 93  Site: --  Mode: --  Orthostatic VS  03-23-22 @ 08:12  Lying BP: --/-- HR: --  Sitting BP: 87/64 HR: 87  Standing BP: 63/51 HR: 112  Site: upper left arm  Mode: electronic  Orthostatic VS  03-23-22 @ 07:35  Lying BP: --/-- HR: --  Sitting BP: 82/61 HR: 80  Standing BP: 66/38 HR: 93  Site: --  Mode: --  Orthostatic VS  03-23-22 @ 07:30  Lying BP: --/-- HR: --  Sitting BP: 90/60 HR: 110  Standing BP: --/-- HR: --  Site: --  Mode: --  Orthostatic VS  03-22-22 @ 08:45  Lying BP: --/-- HR: --  Sitting BP: 108/80 HR: 68  Standing BP: --/-- HR: --  Site: --  Mode: --

## 2022-03-24 PROCEDURE — 90837 PSYTX W PT 60 MINUTES: CPT

## 2022-03-24 PROCEDURE — 99232 SBSQ HOSP IP/OBS MODERATE 35: CPT

## 2022-03-24 RX ORDER — NICOTINE POLACRILEX 2 MG
1 GUM BUCCAL DAILY
Refills: 0 | Status: DISCONTINUED | OUTPATIENT
Start: 2022-03-24 | End: 2022-04-21

## 2022-03-24 RX ADMIN — QUETIAPINE FUMARATE 200 MILLIGRAM(S): 200 TABLET, FILM COATED ORAL at 20:33

## 2022-03-24 RX ADMIN — Medication 0.5 MILLIGRAM(S): at 20:33

## 2022-03-24 RX ADMIN — Medication 1 PATCH: at 12:14

## 2022-03-24 RX ADMIN — Medication 1 PATCH: at 18:51

## 2022-03-24 RX ADMIN — Medication 25 MICROGRAM(S): at 09:20

## 2022-03-24 RX ADMIN — Medication 0.5 MILLIGRAM(S): at 15:09

## 2022-03-24 RX ADMIN — SENNA PLUS 2 TABLET(S): 8.6 TABLET ORAL at 20:33

## 2022-03-24 RX ADMIN — SERTRALINE 100 MILLIGRAM(S): 25 TABLET, FILM COATED ORAL at 09:19

## 2022-03-24 RX ADMIN — Medication 81 MILLIGRAM(S): at 09:19

## 2022-03-24 NOTE — BH INPATIENT PSYCHIATRY PROGRESS NOTE - CURRENT MEDICATION
MEDICATIONS  (STANDING):  aspirin enteric coated 81 milliGRAM(s) Oral daily  influenza  Vaccine (HIGH DOSE) 0.7 milliLiter(s) IntraMuscular once  lamoTRIgine 150 milliGRAM(s) Oral <User Schedule>  levothyroxine 25 MICROGram(s) Oral daily  LORazepam     Tablet 0.5 milliGRAM(s) Oral <User Schedule>  nicotine - 21 mG/24Hr(s) Patch 1 patch Transdermal daily  QUEtiapine 200 milliGRAM(s) Oral at bedtime  senna 2 Tablet(s) Oral at bedtime  sertraline 100 milliGRAM(s) Oral daily     MEDICATIONS  (STANDING):  aspirin enteric coated 81 milliGRAM(s) Oral daily  influenza  Vaccine (HIGH DOSE) 0.7 milliLiter(s) IntraMuscular once  lamoTRIgine 150 milliGRAM(s) Oral <User Schedule>  levothyroxine 25 MICROGram(s) Oral daily  LORazepam     Tablet 0.5 milliGRAM(s) Oral <User Schedule>  nicotine - 21 mG/24Hr(s) Patch 1 patch Transdermal daily  QUEtiapine 200 milliGRAM(s) Oral at bedtime  senna 2 Tablet(s) Oral at bedtime  sertraline 100 milliGRAM(s) Oral daily    MEDICATIONS  (PRN):  nicotine  Polacrilex Gum 2 milliGRAM(s) Oral every 2 hours PRN nicotine craving

## 2022-03-24 NOTE — BH INPATIENT PSYCHIATRY PROGRESS NOTE - NSBHCHARTREVIEWVS_PSY_A_CORE FT
Vital Signs Last 24 Hrs  T(C): 36.6 (03-24-22 @ 09:30), Max: 36.6 (03-24-22 @ 09:30)  T(F): 97.8 (03-24-22 @ 09:30), Max: 97.8 (03-24-22 @ 09:30)  HR: --  BP: --  BP(mean): --  RR: --  SpO2: --    Orthostatic VS  03-24-22 @ 09:30  Lying BP: --/-- HR: --  Sitting BP: 125/82 HR: 78  Standing BP: 118/72 HR: 82  Site: upper left arm  Mode: electronic  Orthostatic VS Vital Signs Last 24 Hrs  T(C): 36.6 (03-24-22 @ 09:30), Max: 36.6 (03-24-22 @ 09:30)  T(F): 97.8 (03-24-22 @ 09:30), Max: 97.8 (03-24-22 @ 09:30)  HR: --  BP: --  BP(mean): --  RR: --  SpO2: --    Orthostatic VS  03-24-22 @ 09:30  Lying BP: --/-- HR: --  Sitting BP: 125/82 HR: 78  Standing BP: 118/72 HR: 82  Site: upper left arm  Mode: electronic  Orthostatic VS  03-23-22 @ 09:00  Lying BP: --/-- HR: --  Sitting BP: 108/86 HR: 92  Standing BP: 123/83 HR: 93  Site: --  Mode: --  Orthostatic VS  03-23-22 @ 08:12  Lying BP: --/-- HR: --  Sitting BP: 87/64 HR: 87  Standing BP: 63/51 HR: 112  Site: upper left arm  Mode: electronic  Orthostatic VS  03-23-22 @ 07:35  Lying BP: --/-- HR: --  Sitting BP: 82/61 HR: 80  Standing BP: 66/38 HR: 93  Site: --  Mode: --  Orthostatic VS  03-23-22 @ 07:30  Lying BP: --/-- HR: --  Sitting BP: 90/60 HR: 110  Standing BP: --/-- HR: --  Site: --  Mode: --

## 2022-03-24 NOTE — DIETITIAN INITIAL EVALUATION ADULT. - OTHER INFO
Patient admitted to [facility] for depression. Undergoing ECT. Patient interviewed in her room. Patient states " I'm not eating well and it's interfering with my treatment". ( 3/23 Medical rapid response for hypotension in setting for being NPO for ECT). Food preferences obtained and implemented. Fluids and po intake encouraged. . Denies any GI distress -states bowel movements regular with Senna. Denies any weight changes. Patient is amenable to drinking Ensure supplement. Requests one serving daily. Would like to be on a multivitamin.

## 2022-03-24 NOTE — DIETITIAN INITIAL EVALUATION ADULT. - WEIGHT FOR BMI (KG)
Spoke to patients wife and she wants to know what was the outcome of her husbands test that were done at the hospital .    60.3

## 2022-03-24 NOTE — BH INPATIENT PSYCHIATRY PROGRESS NOTE - MSE UNSTRUCTURED FT
Patient is awake and alert. Casually dressed, appropriate grooming and hygiene. Nails newly painted. No PMR/PMA or other abnormal movements. Speech fluent. Mood is "okay." Affect is anxious, constricted, mood congruent. TP coherent, logical, overinclusive.  No delusions. No perceptual disturbance. Denies any thoughts of hurting herself. Expresses guilt over OD. Insight and judgment limited. Oriented x3

## 2022-03-24 NOTE — BH INPATIENT PSYCHIATRY PROGRESS NOTE - NSBHMETABOLIC_PSY_ALL_CORE_FT
BMI: BMI (kg/m2): 24.9 (03-10-22 @ 15:37)  HbA1c: A1C with Estimated Average Glucose Result: 4.8 % (03-05-22 @ 10:01)  Glucose: POCT Blood Glucose.: 113 mg/dL (03-02-22 @ 19:36)    BP: 104/58 (03-21-22 @ 11:40) (104/58 - 135/78)  Lipid Panel: Date/Time: 03-05-22 @ 10:39  Cholesterol, Serum: 148  Direct LDL: --  HDL Cholesterol, Serum: 45  Total Cholesterol/HDL Ration Measurement: --  Triglycerides, Serum: 98   BMI: BMI (kg/m2): 24.9 (03-10-22 @ 15:37)  HbA1c: A1C with Estimated Average Glucose Result: 4.8 % (03-05-22 @ 10:01)    Glucose: POCT Blood Glucose.: 113 mg/dL (03-02-22 @ 19:36)    BP: --  Lipid Panel: Date/Time: 03-05-22 @ 10:39  Cholesterol, Serum: 148  Direct LDL: --  HDL Cholesterol, Serum: 45  Total Cholesterol/HDL Ration Measurement: --  Triglycerides, Serum: 98

## 2022-03-24 NOTE — BH INPATIENT PSYCHIATRY PROGRESS NOTE - NSBHFUPINTERVALHXFT_PSY_A_CORE
Patient seen for follow-up for anxiety, depression and recent overdose. Patient is adherent with all medications. No acute events or PRN medications required overnight. Stable vitals this morning, patient denies lightheadedness and dizziness. Per staff, patient is visible on unit, cooperative, pleasant with staff.     Today patient is found in her room working on homework from psychologist focused on negative thought challenging and distress tolerance. She states that she realizes all her feelings are fear-based. She reports difficulty with her homework because "I can't just change my perspective." Patient was provided with support and additional psychoeducation around negative thought processes. She was encouraged to journal when feeling anxious to identify etiology of anxiety. The patient is agreeable to decrease her dose of standing Ativan; she opts to discontinue her morning dose and continue her afternoon and evening doses at current dosage. She also reports anxiety from nicotine cravings and requests patch (smokes 1ppd). No acute suicidality.

## 2022-03-24 NOTE — BH INPATIENT PSYCHIATRY PROGRESS NOTE - NSBHASSESSSUMMFT_PSY_ALL_CORE
71-year-old woman with documented PPH of Bipolar 2 disorder (though questionable if hx is consistent with ahsan/hypomania), 2 prior suicide attempts (via OD), 2 psych admissions (1987, 1992), no h/o substance abuse, recently switched outpatient providers after her psychiatrist of 30 years (Dr. Kong) retired in late 2021, now transferred to [facility] from [facility] for management of depression after being stabilized for AMS after patient overdosed on 90 tabs of Ativan and several tabs of Lamictal and Seroquel iso 1 year of low mood, anxiety, feelings of worthlessness, hopelessness, and overwhelming loneliness. Patient has had ECT in the past, aim is to restart ECT during current hospitalization. Ms. Salaazr appears to be unhappy with her new outpatient treatment team, idealizing Dr. Kong and describing her new psychiatrist and therapist as dismissive and uncaring. Ms. Salazar was tearful while sharing her story but expressed a sense of hope that with ECT she will once again feel like herself again.    Ms Salazar lives alone, she is , has no children, and is retired . She still volunteers sometimes in school and identifies her brother as her main source of support.    Pt now s/p 2 ECT sessions (3/18, 3/21), next session planned for tomorrow. Taking medications, denies side effects. Patient is agreeable to discontinue AM Ativan dose to continue benzo taper. Continues to express anxiety about the future. Thought content continues to be grossly negative with hopelessness and negative self image.     1. Legal: 9.27 admission  2. Safety: routine observation  3. Psychiatric:   -Continue Lamictal 150mg QHS, hold Lamictal the night before ECT (hold Sun, Tues, and Thurs evenings)  -Continue Seroquel 200mg QHS  -Continue Ativan taper, last decreased on 3/24 - currently on 0.5mg at 3PM, 0.5mg at 9PM  -Continue sertraline 100mg, titrate as tolerated  4. Medical:   -Continue Synthroid 25mg daily, senna qhs, ASA 81mg daily  -Pt with elevation of TSH to 7.00 (up from 4.05) and bump in LFTs during medical hospitalization; plan to recheck with routine labs  -nicotine patch and PRN nicotine gum for cravings    3/19 Depressed, tolerated ECT, cont ECT 3/21 Cont other meds  3/20 Depressed anxious, no SI. Cont ECT will communicate above to psychologist  3/21: Patient continues to be mostly withdrawn. Continues to present overly catastrophic and negative thinking. Case discussed with rest of treatment team. Will continue ECT 3x/ week. Will continue titrating Sertraline and tapering Ativan as tolerated.    3/23: Hold ECT for hypotension, pt now with normal vitals.   3/24: Vitals stable. Patient agreeable to discontinue AM ativan dose to continue benzo taper.

## 2022-03-25 PROCEDURE — 90870 ELECTROCONVULSIVE THERAPY: CPT

## 2022-03-25 PROCEDURE — 99232 SBSQ HOSP IP/OBS MODERATE 35: CPT | Mod: 25

## 2022-03-25 RX ORDER — FLUMAZENIL 0.1 MG/ML
0.2 VIAL (ML) INTRAVENOUS ONCE
Refills: 0 | Status: COMPLETED | OUTPATIENT
Start: 2022-03-25 | End: 2022-03-25

## 2022-03-25 RX ORDER — ACETAMINOPHEN 500 MG
650 TABLET ORAL ONCE
Refills: 0 | Status: COMPLETED | OUTPATIENT
Start: 2022-03-25 | End: 2022-03-25

## 2022-03-25 RX ADMIN — LAMOTRIGINE 150 MILLIGRAM(S): 25 TABLET, ORALLY DISINTEGRATING ORAL at 21:52

## 2022-03-25 RX ADMIN — Medication 1 PATCH: at 12:48

## 2022-03-25 RX ADMIN — Medication 81 MILLIGRAM(S): at 12:36

## 2022-03-25 RX ADMIN — Medication 0.5 MILLIGRAM(S): at 14:57

## 2022-03-25 RX ADMIN — Medication 0.2 MILLIGRAM(S): at 10:47

## 2022-03-25 RX ADMIN — Medication 1 TABLET(S): at 12:36

## 2022-03-25 RX ADMIN — QUETIAPINE FUMARATE 200 MILLIGRAM(S): 200 TABLET, FILM COATED ORAL at 21:51

## 2022-03-25 RX ADMIN — SERTRALINE 100 MILLIGRAM(S): 25 TABLET, FILM COATED ORAL at 12:36

## 2022-03-25 RX ADMIN — Medication 1 PATCH: at 06:05

## 2022-03-25 RX ADMIN — Medication 25 MICROGRAM(S): at 12:36

## 2022-03-25 RX ADMIN — SENNA PLUS 2 TABLET(S): 8.6 TABLET ORAL at 21:52

## 2022-03-25 RX ADMIN — Medication 650 MILLIGRAM(S): at 15:30

## 2022-03-25 RX ADMIN — Medication 0.5 MILLIGRAM(S): at 21:51

## 2022-03-25 RX ADMIN — Medication 650 MILLIGRAM(S): at 14:58

## 2022-03-25 NOTE — BH INPATIENT PSYCHIATRY PROGRESS NOTE - NSBHCHARTREVIEWVS_PSY_A_CORE FT
Vital Signs Last 24 Hrs  T(C): 36.6 (03-25-22 @ 11:35), Max: 36.6 (03-25-22 @ 02:58)  T(F): 97.8 (03-25-22 @ 11:35), Max: 97.8 (03-25-22 @ 02:58)  HR: 89 (03-25-22 @ 11:35) (70 - 107)  BP: 137/86 (03-25-22 @ 11:35) (111/78 - 155/92)  BP(mean): 81 (03-25-22 @ 11:05) (81 - 108)  RR: 18 (03-25-22 @ 11:35) (16 - 19)  SpO2: 95% (03-25-22 @ 11:35) (95% - 98%)    Orthostatic VS  03-25-22 @ 06:51  Lying BP: 102/60 HR: 68  Sitting BP: 92/62 HR: 70  Standing BP: --/-- HR: --  Site: --  Mode: --   Vital Signs Last 24 Hrs  T(C): 36.6 (03-25-22 @ 11:35), Max: 36.6 (03-25-22 @ 02:58)  T(F): 97.8 (03-25-22 @ 11:35), Max: 97.8 (03-25-22 @ 02:58)  HR: 89 (03-25-22 @ 11:35) (70 - 107)  BP: 137/86 (03-25-22 @ 11:35) (111/78 - 155/92)  BP(mean): 81 (03-25-22 @ 11:05) (81 - 108)  RR: 18 (03-25-22 @ 11:35) (16 - 19)  SpO2: 95% (03-25-22 @ 11:35) (95% - 98%)    Orthostatic VS  03-25-22 @ 06:51  Lying BP: 102/60 HR: 68  Sitting BP: 92/62 HR: 70  Standing BP: --/-- HR: --  Site: --  Mode: --  Orthostatic VS  03-24-22 @ 09:30  Lying BP: --/-- HR: --  Sitting BP: 125/82 HR: 78  Standing BP: 118/72 HR: 82  Site: upper left arm  Mode: electronic

## 2022-03-25 NOTE — BH INPATIENT PSYCHIATRY PROGRESS NOTE - NSBHFUPINTERVALHXFT_PSY_A_CORE
Patient seen for follow-up for anxiety, depression and recent overdose. Patient is adherent with all medications. No acute events or PRN medications required overnight. BP a little low this morning, 92/62. Patient is NPO for 3rd ECT treatment. Patient denies dizziness. Per staff, patient is visible on unit, cooperative, pleasant with staff. Patient underwent 3rd ECT treatment this morning without complication. Patient given fluids during treatment, BP improved to normal range. Seen after treatment. She continues to work on coping with negative self-talk and hopelessness about her future. She denies active SI/HI.

## 2022-03-25 NOTE — BH INPATIENT PSYCHIATRY PROGRESS NOTE - NSBHASSESSSUMMFT_PSY_ALL_CORE
71-year-old woman with documented PPH of Bipolar 2 disorder (though questionable if hx is consistent with ahsan/hypomania), 2 prior suicide attempts (via OD), 2 psych admissions (1987, 1992), no h/o substance abuse, recently switched outpatient providers after her psychiatrist of 30 years (Dr. Kong) retired in late 2021, now transferred to [facility] from [facility] for management of depression after being stabilized for AMS after patient overdosed on 90 tabs of Ativan and several tabs of Lamictal and Seroquel iso 1 year of low mood, anxiety, feelings of worthlessness, hopelessness, and overwhelming loneliness. Patient has had ECT in the past, aim is to restart ECT during current hospitalization. Ms. Salazar appears to be unhappy with her new outpatient treatment team, idealizing Dr. Kong and describing her new psychiatrist and therapist as dismissive and uncaring. Ms. Salazar was tearful while sharing her story but expressed a sense of hope that with ECT she will once again feel like herself again.    Ms Salazar lives alone, she is , has no children, and is retired . She still volunteers sometimes in school and identifies her brother as her main source of support.    Pt now s/p 3 ECT sessions (3/18, 3/21, 3/25). Taking medications, denies side effects. Patient is agreeable to discontinue AM Ativan dose to continue benzo taper. Continues to express anxiety about the future. Thought content continues to be grossly negative with hopelessness and negative self image.     1. Legal: 9.27 admission  2. Safety: routine observation  3. Psychiatric:   -Continue Lamictal 150mg QHS, hold Lamictal the night before ECT (hold Sun, Tues, and Thurs evenings)  -Continue Seroquel 200mg QHS  -Continue Ativan taper, last decreased on 3/24 - currently on 0.5mg at 3PM, 0.5mg at 9PM  -Continue sertraline 100mg, titrate as tolerated  4. Medical:   -Continue Synthroid 25mg daily, senna qhs, ASA 81mg daily  -Pt with elevation of TSH to 7.00 (up from 4.05) and bump in LFTs during medical hospitalization; plan to recheck with routine labs  -nicotine patch and PRN nicotine gum for cravings    3/19 Depressed, tolerated ECT, cont ECT 3/21 Cont other meds  3/20 Depressed anxious, no SI. Cont ECT will communicate above to psychologist  3/21: Patient continues to be mostly withdrawn. Continues to present overly catastrophic and negative thinking. Case discussed with rest of treatment team. Will continue ECT 3x/ week. Will continue titrating Sertraline and tapering Ativan as tolerated.    3/23: Hold ECT for hypotension, pt now with normal vitals.   3/24: Vitals stable. Patient agreeable to discontinue AM ativan dose to continue benzo taper.   3/25: 3rd ECT session

## 2022-03-25 NOTE — ECT PRE-PROCEDURE CHECKLIST - NSECTCONSENT_PSY_ALL_CORE
ECT acute obtained 3/11/22  Anesthesia exp. 6/18/22/yes ECT acute BF obtained 3/11/22  Anesthesia exp. 6/18/22/yes

## 2022-03-25 NOTE — ECT PRE-PROCEDURE CHECKLIST - NSPROEDALEARNPREFOTH_GEN_A_NUR
computer/internet/group instruction/pictorial/skill demonstration
computer/internet/group instruction/pictorial/skill demonstration

## 2022-03-25 NOTE — BH INPATIENT PSYCHIATRY PROGRESS NOTE - CURRENT MEDICATION
MEDICATIONS  (STANDING):  aspirin enteric coated 81 milliGRAM(s) Oral daily  influenza  Vaccine (HIGH DOSE) 0.7 milliLiter(s) IntraMuscular once  lamoTRIgine 150 milliGRAM(s) Oral <User Schedule>  levothyroxine 25 MICROGram(s) Oral daily  LORazepam     Tablet 0.5 milliGRAM(s) Oral <User Schedule>  multivitamin 1 Tablet(s) Oral daily  nicotine - 21 mG/24Hr(s) Patch 1 patch Transdermal daily  QUEtiapine 200 milliGRAM(s) Oral at bedtime  senna 2 Tablet(s) Oral at bedtime  sertraline 100 milliGRAM(s) Oral daily MEDICATIONS  (STANDING):  aspirin enteric coated 81 milliGRAM(s) Oral daily  influenza  Vaccine (HIGH DOSE) 0.7 milliLiter(s) IntraMuscular once  lamoTRIgine 150 milliGRAM(s) Oral <User Schedule>  levothyroxine 25 MICROGram(s) Oral daily  LORazepam     Tablet 0.5 milliGRAM(s) Oral <User Schedule>  multivitamin 1 Tablet(s) Oral daily  nicotine - 21 mG/24Hr(s) Patch 1 patch Transdermal daily  QUEtiapine 200 milliGRAM(s) Oral at bedtime  senna 2 Tablet(s) Oral at bedtime  sertraline 100 milliGRAM(s) Oral daily    MEDICATIONS  (PRN):  nicotine  Polacrilex Gum 2 milliGRAM(s) Oral every 2 hours PRN nicotine craving

## 2022-03-25 NOTE — BH INPATIENT PSYCHIATRY PROGRESS NOTE - NSBHMETABOLIC_PSY_ALL_CORE_FT
BMI: BMI (kg/m2): 24.9 (03-10-22 @ 15:37)  HbA1c: A1C with Estimated Average Glucose Result: 4.8 % (03-05-22 @ 10:01)  Glucose: POCT Blood Glucose.: 113 mg/dL (03-02-22 @ 19:36)    BP: 137/86 (03-25-22 @ 11:35) (111/78 - 155/92)  Lipid Panel: Date/Time: 03-05-22 @ 10:39  Cholesterol, Serum: 148  Direct LDL: --  HDL Cholesterol, Serum: 45  Total Cholesterol/HDL Ration Measurement: --  Triglycerides, Serum: 98   BMI: BMI (kg/m2): 24.9 (03-10-22 @ 15:37)  HbA1c: A1C with Estimated Average Glucose Result: 4.8 % (03-05-22 @ 10:01)    Glucose: POCT Blood Glucose.: 113 mg/dL (03-02-22 @ 19:36)    BP: 137/86 (03-25-22 @ 11:35) (111/78 - 155/92)  Lipid Panel: Date/Time: 03-05-22 @ 10:39  Cholesterol, Serum: 148  Direct LDL: --  HDL Cholesterol, Serum: 45  Total Cholesterol/HDL Ration Measurement: --  Triglycerides, Serum: 98

## 2022-03-25 NOTE — BH INPATIENT PSYCHIATRY PROGRESS NOTE - MSE UNSTRUCTURED FT
Patient is awake and alert. Casually dressed, appropriate grooming and hygiene. No PMR/PMA or other abnormal movements. Speech fluent. Mood is "okay." Affect is anxious, constricted, mood congruent. TP coherent, logical, overinclusive.  No delusions. No perceptual disturbance. Denies any thoughts of hurting herself. Expresses guilt over OD. Insight and judgment limited. Oriented x3

## 2022-03-26 RX ADMIN — QUETIAPINE FUMARATE 200 MILLIGRAM(S): 200 TABLET, FILM COATED ORAL at 20:31

## 2022-03-26 RX ADMIN — Medication 1 PATCH: at 07:50

## 2022-03-26 RX ADMIN — Medication 1 DROP(S): at 18:02

## 2022-03-26 RX ADMIN — Medication 25 MICROGRAM(S): at 09:59

## 2022-03-26 RX ADMIN — Medication 81 MILLIGRAM(S): at 09:59

## 2022-03-26 RX ADMIN — LAMOTRIGINE 150 MILLIGRAM(S): 25 TABLET, ORALLY DISINTEGRATING ORAL at 20:31

## 2022-03-26 RX ADMIN — Medication 1 TABLET(S): at 09:59

## 2022-03-26 RX ADMIN — Medication 1 PATCH: at 18:32

## 2022-03-26 RX ADMIN — Medication 0.5 MILLIGRAM(S): at 20:31

## 2022-03-26 RX ADMIN — Medication 0.5 MILLIGRAM(S): at 15:12

## 2022-03-26 RX ADMIN — Medication 1 PATCH: at 09:56

## 2022-03-26 RX ADMIN — SENNA PLUS 2 TABLET(S): 8.6 TABLET ORAL at 20:31

## 2022-03-26 RX ADMIN — SERTRALINE 100 MILLIGRAM(S): 25 TABLET, FILM COATED ORAL at 09:59

## 2022-03-26 RX ADMIN — Medication 1 PATCH: at 09:58

## 2022-03-27 LAB — SARS-COV-2 RNA SPEC QL NAA+PROBE: SIGNIFICANT CHANGE UP

## 2022-03-27 RX ADMIN — QUETIAPINE FUMARATE 200 MILLIGRAM(S): 200 TABLET, FILM COATED ORAL at 20:37

## 2022-03-27 RX ADMIN — Medication 1 PATCH: at 06:48

## 2022-03-27 RX ADMIN — Medication 1 PATCH: at 09:30

## 2022-03-27 RX ADMIN — Medication 1 DROP(S): at 09:30

## 2022-03-27 RX ADMIN — Medication 81 MILLIGRAM(S): at 09:31

## 2022-03-27 RX ADMIN — Medication 1 PATCH: at 18:36

## 2022-03-27 RX ADMIN — Medication 0.5 MILLIGRAM(S): at 20:36

## 2022-03-27 RX ADMIN — Medication 25 MICROGRAM(S): at 06:10

## 2022-03-27 RX ADMIN — SENNA PLUS 2 TABLET(S): 8.6 TABLET ORAL at 20:36

## 2022-03-27 RX ADMIN — Medication 0.5 MILLIGRAM(S): at 15:04

## 2022-03-27 RX ADMIN — Medication 1 TABLET(S): at 09:31

## 2022-03-27 RX ADMIN — SERTRALINE 100 MILLIGRAM(S): 25 TABLET, FILM COATED ORAL at 09:31

## 2022-03-28 PROCEDURE — 90870 ELECTROCONVULSIVE THERAPY: CPT

## 2022-03-28 PROCEDURE — 90832 PSYTX W PT 30 MINUTES: CPT

## 2022-03-28 RX ORDER — FLUMAZENIL 0.1 MG/ML
0.2 VIAL (ML) INTRAVENOUS ONCE
Refills: 0 | Status: COMPLETED | OUTPATIENT
Start: 2022-03-28 | End: 2022-03-28

## 2022-03-28 RX ORDER — SERTRALINE 25 MG/1
150 TABLET, FILM COATED ORAL DAILY
Refills: 0 | Status: DISCONTINUED | OUTPATIENT
Start: 2022-03-28 | End: 2022-04-08

## 2022-03-28 RX ORDER — ONDANSETRON 8 MG/1
4 TABLET, FILM COATED ORAL ONCE
Refills: 0 | Status: COMPLETED | OUTPATIENT
Start: 2022-03-28 | End: 2022-03-28

## 2022-03-28 RX ADMIN — LAMOTRIGINE 150 MILLIGRAM(S): 25 TABLET, ORALLY DISINTEGRATING ORAL at 20:22

## 2022-03-28 RX ADMIN — ONDANSETRON 4 MILLIGRAM(S): 8 TABLET, FILM COATED ORAL at 13:40

## 2022-03-28 RX ADMIN — Medication 0.5 MILLIGRAM(S): at 13:41

## 2022-03-28 RX ADMIN — Medication 1 PATCH: at 13:40

## 2022-03-28 RX ADMIN — SERTRALINE 150 MILLIGRAM(S): 25 TABLET, FILM COATED ORAL at 13:43

## 2022-03-28 RX ADMIN — Medication 1 PATCH: at 18:59

## 2022-03-28 RX ADMIN — SENNA PLUS 2 TABLET(S): 8.6 TABLET ORAL at 20:23

## 2022-03-28 RX ADMIN — Medication 1 PATCH: at 06:41

## 2022-03-28 RX ADMIN — QUETIAPINE FUMARATE 200 MILLIGRAM(S): 200 TABLET, FILM COATED ORAL at 20:23

## 2022-03-28 RX ADMIN — Medication 0.5 MILLIGRAM(S): at 20:23

## 2022-03-28 RX ADMIN — Medication 81 MILLIGRAM(S): at 13:41

## 2022-03-28 RX ADMIN — Medication 0.2 MILLIGRAM(S): at 09:10

## 2022-03-28 RX ADMIN — Medication 1 PATCH: at 19:44

## 2022-03-28 RX ADMIN — Medication 25 MICROGRAM(S): at 05:50

## 2022-03-28 RX ADMIN — Medication 1 TABLET(S): at 13:41

## 2022-03-28 NOTE — BH INPATIENT PSYCHIATRY PROGRESS NOTE - MSE UNSTRUCTURED FT
Patient is awake and alert. Casually dressed, appropriate grooming and hygiene. No PMR/PMA or other abnormal movements. Speech fluent. Mood is "so guilty." Affect is anxious, constricted, mood congruent. TP coherent, logical, overinclusive.  No delusions. No perceptual disturbance. Denies any thoughts of hurting herself. Expresses extreme guilt over OD. Slight confusion, cannot remember names of medications (post-ECT). Insight and judgment limited. Impulse control intact.

## 2022-03-28 NOTE — BH INPATIENT PSYCHIATRY PROGRESS NOTE - NSBHFUPINTERVALHXFT_PSY_A_CORE
Patient seen for follow-up for anxiety, depression and recent overdose. Patient is adherent with all medications. No acute events or PRN medications required over the weekend. She had her 4th ECT session this morning without complication. Patient was seen shortly after she returned to unit. She endorses some confusion 2/2 ECT, cannot remember the names of her medications. She reports that she had a difficult weekend because she has been feeling very guilty about her suicide attempt and feels she negatively impacted her family. She reports that she was raised in the [employer] and was taught that suicide is a terrible sin. She has spoken with the  here but did not find it very helpful; she is considering calling the  from her [employer] for support. She was encouraged to continue her work on negative self-image and moving forward with her treatment. She denies SI/HI in the hospital.

## 2022-03-28 NOTE — BH INPATIENT PSYCHIATRY PROGRESS NOTE - NSBHASSESSSUMMFT_PSY_ALL_CORE
71-year-old woman with documented PPH of Bipolar 2 disorder (though questionable if hx is consistent with ahsan/hypomania), 2 prior suicide attempts (via OD), 2 psych admissions (1987, 1992), no h/o substance abuse, recently switched outpatient providers after her psychiatrist of 30 years (Dr. Kong) retired in late 2021, now transferred to [facility] from [facility] for management of depression after being stabilized for AMS after patient overdosed on 90 tabs of Ativan and several tabs of Lamictal and Seroquel iso 1 year of low mood, anxiety, feelings of worthlessness, hopelessness, and overwhelming loneliness. Patient has had ECT in the past, aim is to restart ECT during current hospitalization. Ms. Salazar appears to be unhappy with her new outpatient treatment team, idealizing Dr. Kong and describing her new psychiatrist and therapist as dismissive and uncaring. Ms. Salazar was tearful while sharing her story but expressed a sense of hope that with ECT she will once again feel like herself again.    Ms Salazar lives alone, she is , has no children, and is retired . She still volunteers sometimes in school and identifies her brother as her main source of support.    Pt now s/p 4 ECT sessions (3/18, 3/21, 3/25, 3/28). Patient with some confusion after last ECT session, could not remember names of medications. Will continue to monitor, may need to space out ECT sessions. Patient is adherent with medications, denies side effects. Continues to express guilt about her SA and anxiety about the future. Thought content continues to be grossly negative with hopelessness and negative self image.     1. Legal: 9.27 admission  2. Safety: routine observation  3. Psychiatric:   -Continue Lamictal 150mg QHS, hold Lamictal the night before ECT (hold Sun, Tues, and Thurs evenings)  -Continue Seroquel 200mg QHS  -Continue Ativan taper, last decreased on 3/24 - currently on 0.5mg at 3PM, 0.5mg at 9PM  -Increase sertraline to 150mg, titrate as tolerated  4. Medical:   -Continue Synthroid 25mg daily, senna qhs, ASA 81mg daily  -Pt with elevation of TSH to 7.00 (up from 4.05) and bump in LFTs during medical hospitalization; plan to recheck with routine labs  -nicotine patch and PRN nicotine gum for cravings    3/19 Depressed, tolerated ECT, cont ECT 3/21 Cont other meds  3/20 Depressed anxious, no SI. Cont ECT will communicate above to psychologist  3/21: Patient continues to be mostly withdrawn. Continues to present overly catastrophic and negative thinking. Case discussed with rest of treatment team. Will continue ECT 3x/ week. Will continue titrating Sertraline and tapering Ativan as tolerated.    3/23: Hold ECT for hypotension, pt now with normal vitals.   3/24: Vitals stable. Patient agreeable to discontinue AM ativan dose to continue benzo taper.   3/25: 3rd ECT session  3/28: 4th ECT session, mild disorientation after procedure; increase sertraline to 150mg

## 2022-03-28 NOTE — BH INPATIENT PSYCHIATRY PROGRESS NOTE - NSBHMETABOLIC_PSY_ALL_CORE_FT
BMI: BMI (kg/m2): 24.9 (03-10-22 @ 15:37)  HbA1c: A1C with Estimated Average Glucose Result: 4.8 % (03-05-22 @ 10:01)  Glucose: POCT Blood Glucose.: 113 mg/dL (03-02-22 @ 19:36)    BP: 110/53 (03-28-22 @ 09:45) (110/53 - 147/89)  Lipid Panel: Date/Time: 03-05-22 @ 10:39  Cholesterol, Serum: 148  Direct LDL: --  HDL Cholesterol, Serum: 45  Total Cholesterol/HDL Ration Measurement: --  Triglycerides, Serum: 98

## 2022-03-28 NOTE — BH INPATIENT PSYCHIATRY PROGRESS NOTE - PRN MEDS
MEDICATIONS  (PRN):  artificial tears (preservative free) Ophthalmic Solution 1 Drop(s) Left EYE four times a day PRN dry eyes  nicotine  Polacrilex Gum 2 milliGRAM(s) Oral every 2 hours PRN nicotine craving

## 2022-03-28 NOTE — BH INPATIENT PSYCHIATRY PROGRESS NOTE - NSBHCHARTREVIEWVS_PSY_A_CORE FT
Vital Signs Last 24 Hrs  T(C): 36.7 (03-28-22 @ 09:45), Max: 36.9 (03-28-22 @ 06:29)  T(F): 98.1 (03-28-22 @ 09:45), Max: 98.5 (03-28-22 @ 06:29)  HR: 91 (03-28-22 @ 09:45) (68 - 95)  BP: 110/53 (03-28-22 @ 09:45) (110/53 - 147/89)  BP(mean): --  RR: 18 (03-28-22 @ 09:45) (17 - 19)  SpO2: 99% (03-28-22 @ 09:45) (95% - 99%)    Orthostatic VS  03-28-22 @ 06:29  Lying BP: --/-- HR: --  Sitting BP: 101/69 HR: 80  Standing BP: --/-- HR: --  Site: --  Mode: --

## 2022-03-28 NOTE — BH INPATIENT PSYCHIATRY PROGRESS NOTE - CURRENT MEDICATION
MEDICATIONS  (STANDING):  aspirin enteric coated 81 milliGRAM(s) Oral daily  influenza  Vaccine (HIGH DOSE) 0.7 milliLiter(s) IntraMuscular once  lamoTRIgine 150 milliGRAM(s) Oral <User Schedule>  levothyroxine 25 MICROGram(s) Oral daily  LORazepam     Tablet 0.5 milliGRAM(s) Oral <User Schedule>  multivitamin 1 Tablet(s) Oral daily  nicotine - 21 mG/24Hr(s) Patch 1 patch Transdermal daily  QUEtiapine 200 milliGRAM(s) Oral at bedtime  senna 2 Tablet(s) Oral at bedtime  sertraline 150 milliGRAM(s) Oral daily

## 2022-03-29 PROCEDURE — 90853 GROUP PSYCHOTHERAPY: CPT

## 2022-03-29 PROCEDURE — 90832 PSYTX W PT 30 MINUTES: CPT | Mod: 59

## 2022-03-29 PROCEDURE — 99232 SBSQ HOSP IP/OBS MODERATE 35: CPT

## 2022-03-29 RX ORDER — PANTOPRAZOLE SODIUM 20 MG/1
40 TABLET, DELAYED RELEASE ORAL
Refills: 0 | Status: DISCONTINUED | OUTPATIENT
Start: 2022-03-29 | End: 2022-03-31

## 2022-03-29 RX ADMIN — Medication 1 TABLET(S): at 09:00

## 2022-03-29 RX ADMIN — SENNA PLUS 2 TABLET(S): 8.6 TABLET ORAL at 20:44

## 2022-03-29 RX ADMIN — Medication 0.5 MILLIGRAM(S): at 20:45

## 2022-03-29 RX ADMIN — SERTRALINE 150 MILLIGRAM(S): 25 TABLET, FILM COATED ORAL at 12:39

## 2022-03-29 RX ADMIN — Medication 1 PATCH: at 13:29

## 2022-03-29 RX ADMIN — Medication 1 PATCH: at 06:29

## 2022-03-29 RX ADMIN — Medication 25 MICROGRAM(S): at 06:24

## 2022-03-29 RX ADMIN — Medication 1 PATCH: at 18:52

## 2022-03-29 RX ADMIN — Medication 81 MILLIGRAM(S): at 09:00

## 2022-03-29 RX ADMIN — QUETIAPINE FUMARATE 200 MILLIGRAM(S): 200 TABLET, FILM COATED ORAL at 20:45

## 2022-03-29 RX ADMIN — Medication 0.5 MILLIGRAM(S): at 12:39

## 2022-03-29 RX ADMIN — Medication 1 PATCH: at 09:00

## 2022-03-29 NOTE — BH INPATIENT PSYCHIATRY PROGRESS NOTE - NSCGIIMPROVESX_PSY_ALL_CORE
3 = Minimally improved - slightly better with little or no clinically meaningful reduction of symptoms.  Represents very little change in basic clinical status, level of care, or functional capacity.

## 2022-03-29 NOTE — BH INPATIENT PSYCHIATRY PROGRESS NOTE - NSCGISEVERILLNESS_PSY_ALL_CORE
6 = Severely ill - disruptive pathology, behavior and function are frequently influenced by symptoms, may require assistance from others

## 2022-03-29 NOTE — BH INPATIENT PSYCHIATRY PROGRESS NOTE - NSBHASSESSSUMMFT_PSY_ALL_CORE
71-year-old woman with documented PPH of Bipolar 2 disorder (though questionable if hx is consistent with ahsan/hypomania), 2 prior suicide attempts (via OD), 2 psych admissions (1987, 1992), no h/o substance abuse, recently switched outpatient providers after her psychiatrist of 30 years (Dr. Kong) retired in late 2021, now transferred to [facility] from [facility] for management of depression after being stabilized for AMS after patient overdosed on 90 tabs of Ativan and several tabs of Lamictal and Seroquel iso 1 year of low mood, anxiety, feelings of worthlessness, hopelessness, and overwhelming loneliness. Patient has had ECT in the past, aim is to restart ECT during current hospitalization. Ms. Salazar appears to be unhappy with her new outpatient treatment team, idealizing Dr. Kong and describing her new psychiatrist and therapist as dismissive and uncaring. Ms. Salazar was tearful while sharing her story but expressed a sense of hope that with ECT she will once again feel like herself again.    Ms Salazar lives alone, she is , has no children, and is retired . She still volunteers sometimes in school and identifies her brother as her main source of support.    Pt now s/p 4 ECT sessions (3/18, 3/21, 3/25, 3/28). Patient with some nausea and confusion after last ECT session. Will continue to monitor, may need to space out ECT sessions to twice per week. Patient is adherent with medications, denies side effects. Continues to express guilt about her SA and anxiety about the future. Thought content with mild improvement in hopelessness and negative self-image as patient engages more in psychotherapy.     1. Legal: 9.27 admission  2. Safety: routine observation  3. Psychiatric:   -Continue Lamictal 150mg QHS, hold Lamictal the night before ECT (hold Sun, Tues, and Thurs evenings)  -Continue Seroquel 200mg QHS  -Continue Ativan taper, last decreased on 3/24 - currently 0.5mg at 12PM, 0.5mg at 9PM  -sertraline 150mg, titrate as tolerated  -pantoprazole 6AM Mon/Wed/Fri prior to ECT to prevent nausea  4. Medical:   -Continue Synthroid 25mg daily, senna qhs, ASA 81mg daily  -Pt with elevation of TSH to 7.00 (up from 4.05) and bump in LFTs during medical hospitalization; plan to recheck with routine labs  -nicotine patch and PRN nicotine gum for cravings    3/19 Depressed, tolerated ECT, cont ECT 3/21 Cont other meds  3/20 Depressed anxious, no SI. Cont ECT will communicate above to psychologist  3/21: Patient continues to be mostly withdrawn. Continues to present overly catastrophic and negative thinking. Case discussed with rest of treatment team. Will continue ECT 3x/ week. Will continue titrating Sertraline and tapering Ativan as tolerated.    3/23: Hold ECT for hypotension, pt now with normal vitals.   3/24: Vitals stable. Patient agreeable to discontinue AM ativan dose to continue benzo taper.   3/25: 3rd ECT session  3/28: 4th ECT session, mild disorientation and nausea after procedure; increase sertraline to 150mg

## 2022-03-29 NOTE — BH INPATIENT PSYCHIATRY PROGRESS NOTE - CURRENT MEDICATION
MEDICATIONS  (STANDING):  aspirin enteric coated 81 milliGRAM(s) Oral daily  influenza  Vaccine (HIGH DOSE) 0.7 milliLiter(s) IntraMuscular once  lamoTRIgine 150 milliGRAM(s) Oral <User Schedule>  levothyroxine 25 MICROGram(s) Oral daily  LORazepam     Tablet 0.5 milliGRAM(s) Oral <User Schedule>  multivitamin 1 Tablet(s) Oral daily  nicotine - 21 mG/24Hr(s) Patch 1 patch Transdermal daily  pantoprazole    Tablet 40 milliGRAM(s) Oral <User Schedule>  QUEtiapine 200 milliGRAM(s) Oral at bedtime  senna 2 Tablet(s) Oral at bedtime  sertraline 150 milliGRAM(s) Oral daily    MEDICATIONS  (PRN):  artificial tears (preservative free) Ophthalmic Solution 1 Drop(s) Left EYE four times a day PRN dry eyes  nicotine  Polacrilex Gum 2 milliGRAM(s) Oral every 2 hours PRN nicotine craving

## 2022-03-29 NOTE — BH INPATIENT PSYCHIATRY PROGRESS NOTE - NSBHCHARTREVIEWVS_PSY_A_CORE FT
Vital Signs Last 24 Hrs  T(C): 36.6 (03-29-22 @ 08:16), Max: 36.6 (03-29-22 @ 08:16)  T(F): 97.9 (03-29-22 @ 08:16), Max: 97.9 (03-29-22 @ 08:16)  HR: --  BP: --  BP(mean): --  RR: --  SpO2: --    Orthostatic VS  03-29-22 @ 08:16  Lying BP: --/-- HR: --  Sitting BP: 103/69 HR: 71  Standing BP: --/-- HR: --  Site: upper left arm  Mode: electronic   Vital Signs Last 24 Hrs  T(C): 36.6 (03-29-22 @ 08:16), Max: 36.6 (03-29-22 @ 08:16)  T(F): 97.9 (03-29-22 @ 08:16), Max: 97.9 (03-29-22 @ 08:16)  HR: --  BP: --  BP(mean): --  RR: --  SpO2: --    Orthostatic VS  03-29-22 @ 08:16  Lying BP: --/-- HR: --  Sitting BP: 103/69 HR: 71  Standing BP: --/-- HR: --  Site: upper left arm  Mode: electronic  Orthostatic VS  03-28-22 @ 06:29  Lying BP: --/-- HR: --  Sitting BP: 101/69 HR: 80  Standing BP: --/-- HR: --  Site: --  Mode: --

## 2022-03-29 NOTE — BH INPATIENT PSYCHIATRY PROGRESS NOTE - MSE UNSTRUCTURED FT
Patient is awake and alert. Casually dressed, appropriate grooming and hygiene. No PMR/PMA or other abnormal movements. Speech fluent. Mood is "nervous." Affect is anxious, constricted, mood congruent. TP coherent, logical, overinclusive.  No delusions. Perseverative on negative self-talk. No perceptual disturbance. Denies any thoughts of hurting herself. Expresses extreme guilt over OD. Attention appropriate, cognition grossly intact. Insight and judgment limited. Impulse control intact.

## 2022-03-29 NOTE — BH INPATIENT PSYCHIATRY PROGRESS NOTE - NSBHFUPINTERVALHXFT_PSY_A_CORE
Patient seen for follow-up for anxiety, depression and recent overdose. Patient is adherent with all medications on the unit. No acute events or PRN medications required overnight. Pt had her 4th ECT session yesterday morning; she reported nausea and increased confusion following this treatment. She was treated with zofran for the nausea and was able to eat dinner last night. She denies physical complaints today including n/v and lightheadedness.     Today pt reports that she is nervous at the thought of returning home. She says that she likes to keep herself busy but when she has free time she feels bored. Here she uses free time to socialize, call friends, watch TV, read books, and do crossword puzzles. Writer provided validation of patient's anxiety and psychoeducation on the long-term goals of psychiatric treatment. She also had questions about her therapy homework; she was encouraged to speak with Dr. Rossi about her questions. She reports some anxiety that she attributes to decrease in benzo. Patient has been on benzo for years; she is currently on Ativan 0.5mg at noon and 9PM daily. She reports feeling okay in the morning with anxiety in the afternoon that responds well to her first dose of Ativan, but reports that her anxiety returns in the evening prior to her bedtime dose. She will continue to monitor her anxiety levels throughout the day.

## 2022-03-30 PROCEDURE — 90870 ELECTROCONVULSIVE THERAPY: CPT

## 2022-03-30 PROCEDURE — 90853 GROUP PSYCHOTHERAPY: CPT

## 2022-03-30 PROCEDURE — 99232 SBSQ HOSP IP/OBS MODERATE 35: CPT | Mod: 25

## 2022-03-30 RX ORDER — FLUMAZENIL 0.1 MG/ML
0.2 VIAL (ML) INTRAVENOUS ONCE
Refills: 0 | Status: COMPLETED | OUTPATIENT
Start: 2022-03-30 | End: 2022-03-30

## 2022-03-30 RX ORDER — POLYETHYLENE GLYCOL 3350 17 G/17G
17 POWDER, FOR SOLUTION ORAL ONCE
Refills: 0 | Status: COMPLETED | OUTPATIENT
Start: 2022-03-30 | End: 2022-03-30

## 2022-03-30 RX ADMIN — Medication 81 MILLIGRAM(S): at 10:05

## 2022-03-30 RX ADMIN — Medication 1 PATCH: at 18:35

## 2022-03-30 RX ADMIN — SENNA PLUS 2 TABLET(S): 8.6 TABLET ORAL at 21:52

## 2022-03-30 RX ADMIN — QUETIAPINE FUMARATE 200 MILLIGRAM(S): 200 TABLET, FILM COATED ORAL at 21:52

## 2022-03-30 RX ADMIN — Medication 1 PATCH: at 10:05

## 2022-03-30 RX ADMIN — Medication 25 MICROGRAM(S): at 06:37

## 2022-03-30 RX ADMIN — Medication 0.5 MILLIGRAM(S): at 21:51

## 2022-03-30 RX ADMIN — LAMOTRIGINE 150 MILLIGRAM(S): 25 TABLET, ORALLY DISINTEGRATING ORAL at 21:52

## 2022-03-30 RX ADMIN — Medication 1 PATCH: at 07:11

## 2022-03-30 RX ADMIN — PANTOPRAZOLE SODIUM 40 MILLIGRAM(S): 20 TABLET, DELAYED RELEASE ORAL at 06:37

## 2022-03-30 RX ADMIN — SERTRALINE 150 MILLIGRAM(S): 25 TABLET, FILM COATED ORAL at 12:22

## 2022-03-30 RX ADMIN — Medication 1 TABLET(S): at 10:05

## 2022-03-30 RX ADMIN — Medication 0.5 MILLIGRAM(S): at 12:22

## 2022-03-30 RX ADMIN — POLYETHYLENE GLYCOL 3350 17 GRAM(S): 17 POWDER, FOR SOLUTION ORAL at 18:11

## 2022-03-30 RX ADMIN — Medication 0.2 MILLIGRAM(S): at 11:12

## 2022-03-30 NOTE — BH INPATIENT PSYCHIATRY PROGRESS NOTE - NSBHFUPINTERVALHXFT_PSY_A_CORE
Patient seen for follow-up for anxiety, depression and recent overdose. Patient is adherent with all medications on the unit. No acute events or PRN medications required overnight. Pt is NPO for 5th ECT session today. She denies lightheadedness, dizziness, and nausea. Today she reports more hopeful mood and is looking forward to her ECT treatments today and Friday. Denies current suicidality. Patient does not have questions about treatment plan at this time.

## 2022-03-30 NOTE — BH INPATIENT PSYCHIATRY PROGRESS NOTE - CURRENT MEDICATION
MEDICATIONS  (STANDING):  aspirin enteric coated 81 milliGRAM(s) Oral daily  influenza  Vaccine (HIGH DOSE) 0.7 milliLiter(s) IntraMuscular once  lamoTRIgine 150 milliGRAM(s) Oral <User Schedule>  levothyroxine 25 MICROGram(s) Oral daily  LORazepam     Tablet 0.5 milliGRAM(s) Oral <User Schedule>  multivitamin 1 Tablet(s) Oral daily  nicotine - 21 mG/24Hr(s) Patch 1 patch Transdermal daily  pantoprazole    Tablet 40 milliGRAM(s) Oral <User Schedule>  QUEtiapine 200 milliGRAM(s) Oral at bedtime  senna 2 Tablet(s) Oral at bedtime  sertraline 150 milliGRAM(s) Oral daily   MEDICATIONS  (STANDING):  aspirin enteric coated 81 milliGRAM(s) Oral daily  influenza  Vaccine (HIGH DOSE) 0.7 milliLiter(s) IntraMuscular once  lamoTRIgine 150 milliGRAM(s) Oral <User Schedule>  levothyroxine 25 MICROGram(s) Oral daily  LORazepam     Tablet 0.5 milliGRAM(s) Oral <User Schedule>  multivitamin 1 Tablet(s) Oral daily  nicotine - 21 mG/24Hr(s) Patch 1 patch Transdermal daily  pantoprazole    Tablet 40 milliGRAM(s) Oral <User Schedule>  QUEtiapine 200 milliGRAM(s) Oral at bedtime  senna 2 Tablet(s) Oral at bedtime  sertraline 150 milliGRAM(s) Oral daily    MEDICATIONS  (PRN):  artificial tears (preservative free) Ophthalmic Solution 1 Drop(s) Left EYE four times a day PRN dry eyes  nicotine  Polacrilex Gum 2 milliGRAM(s) Oral every 2 hours PRN nicotine craving

## 2022-03-30 NOTE — BH INPATIENT PSYCHIATRY PROGRESS NOTE - NSBHASSESSSUMMFT_PSY_ALL_CORE
71-year-old woman with documented PPH of Bipolar 2 disorder (though questionable if hx is consistent with ahsan/hypomania), 2 prior suicide attempts (via OD), 2 psych admissions (1987, 1992), no h/o substance abuse, recently switched outpatient providers after her psychiatrist of 30 years (Dr. Kong) retired in late 2021, now transferred to [facility] from [facility] for management of depression after being stabilized for AMS after patient overdosed on 90 tabs of Ativan and several tabs of Lamictal and Seroquel iso 1 year of low mood, anxiety, feelings of worthlessness, hopelessness, and overwhelming loneliness. Patient has had ECT in the past, aim is to restart ECT during current hospitalization. Ms. Salazar appears to be unhappy with her new outpatient treatment team, idealizing Dr. Kong and describing her new psychiatrist and therapist as dismissive and uncaring. Ms. Salazar was tearful while sharing her story but expressed a sense of hope that with ECT she will once again feel like herself again.    Ms Salazar lives alone, she is , has no children, and is retired . She still volunteers sometimes in school and identifies her brother as her main source of support.    Pt now s/p 4 ECT sessions (3/18, 3/21, 3/25, 3/28), scheduled for 5th today. Patient with some nausea and confusion after 4th ECT session. Will continue to monitor, may need to space out ECT sessions to twice per week if side effects persist. Patient is adherent with medications, denies side effects. Continues to express guilt about her SA and anxiety about the future. Thought content with some improvement in hopelessness and negative self-image as patient engages more in psychotherapy.     1. Legal: 9.27 admission  2. Safety: routine observation  3. Psychiatric:   -Continue Lamictal 150mg QHS, hold Lamictal the night before ECT (hold Sun, Tues, and Thurs evenings)  -Continue Seroquel 200mg QHS  -Continue Ativan taper, last decreased on 3/24 - currently 0.5mg at 12PM, 0.5mg at 9PM  -sertraline 150mg, titrate as tolerated  -pantoprazole 6AM Mon/Wed/Fri prior to ECT to prevent nausea  4. Medical:   -Continue Synthroid 25mg daily, senna qhs, ASA 81mg daily  -Pt with elevation of TSH to 7.00 (up from 4.05) and bump in LFTs during medical hospitalization; plan to recheck with routine labs  -nicotine patch and PRN nicotine gum for cravings    3/19 Depressed, tolerated ECT, cont ECT 3/21 Cont other meds  3/20 Depressed anxious, no SI. Cont ECT will communicate above to psychologist  3/21: Patient continues to be mostly withdrawn. Continues to present overly catastrophic and negative thinking. Case discussed with rest of treatment team. Will continue ECT 3x/ week. Will continue titrating Sertraline and tapering Ativan as tolerated.    3/23: Hold ECT for hypotension, pt now with normal vitals.   3/24: Vitals stable. Patient agreeable to discontinue AM ativan dose to continue benzo taper.   3/25: 3rd ECT session  3/28: 4th ECT session, mild disorientation and nausea after procedure; increase sertraline to 150mg  3/30: 5th ECT session, brighter affect, more hopeful

## 2022-03-30 NOTE — BH INPATIENT PSYCHIATRY PROGRESS NOTE - NSBHCHARTREVIEWVS_PSY_A_CORE FT
Vital Signs Last 24 Hrs  T(C): 36.6 (03-30-22 @ 06:35), Max: 36.6 (03-30-22 @ 06:35)  T(F): 97.9 (03-30-22 @ 06:35), Max: 97.9 (03-30-22 @ 06:35)  HR: 65 (03-30-22 @ 06:35) (65 - 65)  BP: 104/56 (03-30-22 @ 06:35) (104/56 - 104/56)  BP(mean): --  RR: --  SpO2: --   Vital Signs Last 24 Hrs  T(C): 36.9 (03-30-22 @ 12:00), Max: 36.9 (03-30-22 @ 12:00)  T(F): 98.5 (03-30-22 @ 12:00), Max: 98.5 (03-30-22 @ 12:00)  HR: 74 (03-30-22 @ 12:00) (65 - 92)  BP: 122/79 (03-30-22 @ 12:00) (104/56 - 140/87)  BP(mean): --  RR: 18 (03-30-22 @ 12:00) (14 - 20)  SpO2: 100% (03-30-22 @ 12:00) (95% - 100%)    Orthostatic VS  03-29-22 @ 08:16  Lying BP: --/-- HR: --  Sitting BP: 103/69 HR: 71  Standing BP: --/-- HR: --  Site: upper left arm  Mode: electronic

## 2022-03-30 NOTE — BH INPATIENT PSYCHIATRY PROGRESS NOTE - NSBHMETABOLIC_PSY_ALL_CORE_FT
BMI: BMI (kg/m2): 24.9 (03-10-22 @ 15:37)  HbA1c: A1C with Estimated Average Glucose Result: 4.8 % (03-05-22 @ 10:01)  Glucose: POCT Blood Glucose.: 113 mg/dL (03-02-22 @ 19:36)    BP: 104/56 (03-30-22 @ 06:35) (104/56 - 147/89)  Lipid Panel: Date/Time: 03-05-22 @ 10:39  Cholesterol, Serum: 148  Direct LDL: --  HDL Cholesterol, Serum: 45  Total Cholesterol/HDL Ration Measurement: --  Triglycerides, Serum: 98   BMI: BMI (kg/m2): 24.9 (03-10-22 @ 15:37)  HbA1c: A1C with Estimated Average Glucose Result: 4.8 % (03-05-22 @ 10:01)    Glucose: POCT Blood Glucose.: 113 mg/dL (03-02-22 @ 19:36)    BP: 122/79 (03-30-22 @ 12:00) (104/56 - 147/89)  Lipid Panel: Date/Time: 03-05-22 @ 10:39  Cholesterol, Serum: 148  Direct LDL: --  HDL Cholesterol, Serum: 45  Total Cholesterol/HDL Ration Measurement: --  Triglycerides, Serum: 98

## 2022-03-30 NOTE — BH INPATIENT PSYCHIATRY PROGRESS NOTE - MSE UNSTRUCTURED FT
Patient is awake and alert. In hospital attire, appropriate grooming and hygiene. No PMR/PMA or other abnormal movements. Speech fluent. Mood is "excited." Affect is less anxious, brighter, mood congruent. TP coherent, logical, overinclusive.  No delusions. No perceptual disturbance. Denies any thoughts of hurting herself. Attention appropriate, cognition grossly intact. Insight and judgment limited. Impulse control intact.

## 2022-03-31 PROCEDURE — 99232 SBSQ HOSP IP/OBS MODERATE 35: CPT

## 2022-03-31 RX ORDER — LAMOTRIGINE 25 MG/1
150 TABLET, ORALLY DISINTEGRATING ORAL
Refills: 0 | Status: DISCONTINUED | OUTPATIENT
Start: 2022-03-31 | End: 2022-04-12

## 2022-03-31 RX ORDER — LAMOTRIGINE 25 MG/1
150 TABLET, ORALLY DISINTEGRATING ORAL AT BEDTIME
Refills: 0 | Status: COMPLETED | OUTPATIENT
Start: 2022-03-31 | End: 2022-03-31

## 2022-03-31 RX ORDER — PANTOPRAZOLE SODIUM 20 MG/1
40 TABLET, DELAYED RELEASE ORAL
Refills: 0 | Status: DISCONTINUED | OUTPATIENT
Start: 2022-03-31 | End: 2022-04-21

## 2022-03-31 RX ORDER — MAGNESIUM HYDROXIDE 400 MG/1
30 TABLET, CHEWABLE ORAL ONCE
Refills: 0 | Status: COMPLETED | OUTPATIENT
Start: 2022-03-31 | End: 2022-03-31

## 2022-03-31 RX ADMIN — LAMOTRIGINE 150 MILLIGRAM(S): 25 TABLET, ORALLY DISINTEGRATING ORAL at 20:52

## 2022-03-31 RX ADMIN — Medication 81 MILLIGRAM(S): at 09:54

## 2022-03-31 RX ADMIN — Medication 1 PATCH: at 09:51

## 2022-03-31 RX ADMIN — Medication 25 MICROGRAM(S): at 06:23

## 2022-03-31 RX ADMIN — Medication 0.5 MILLIGRAM(S): at 20:52

## 2022-03-31 RX ADMIN — Medication 1 PATCH: at 18:31

## 2022-03-31 RX ADMIN — MAGNESIUM HYDROXIDE 30 MILLILITER(S): 400 TABLET, CHEWABLE ORAL at 10:06

## 2022-03-31 RX ADMIN — QUETIAPINE FUMARATE 200 MILLIGRAM(S): 200 TABLET, FILM COATED ORAL at 20:51

## 2022-03-31 RX ADMIN — SENNA PLUS 2 TABLET(S): 8.6 TABLET ORAL at 20:51

## 2022-03-31 RX ADMIN — Medication 1 DROP(S): at 09:54

## 2022-03-31 RX ADMIN — Medication 1 TABLET(S): at 09:54

## 2022-03-31 RX ADMIN — Medication 1 PATCH: at 09:54

## 2022-03-31 RX ADMIN — SERTRALINE 150 MILLIGRAM(S): 25 TABLET, FILM COATED ORAL at 12:19

## 2022-03-31 RX ADMIN — Medication 0.5 MILLIGRAM(S): at 12:18

## 2022-03-31 NOTE — BH INPATIENT PSYCHIATRY PROGRESS NOTE - NSBHMETABOLIC_PSY_ALL_CORE_FT
BMI: BMI (kg/m2): 24.9 (03-10-22 @ 15:37)  HbA1c: A1C with Estimated Average Glucose Result: 4.8 % (03-05-22 @ 10:01)    Glucose: POCT Blood Glucose.: 113 mg/dL (03-02-22 @ 19:36)    BP: 122/79 (03-30-22 @ 12:00) (104/56 - 140/87)  Lipid Panel: Date/Time: 03-05-22 @ 10:39  Cholesterol, Serum: 148  Direct LDL: --  HDL Cholesterol, Serum: 45  Total Cholesterol/HDL Ration Measurement: --  Triglycerides, Serum: 98

## 2022-03-31 NOTE — BH INPATIENT PSYCHIATRY PROGRESS NOTE - NSBHFUPINTERVALHXFT_PSY_A_CORE
Patient seen for follow-up for anxiety, depression and recent overdose. Patient is adherent with all medications on the unit. No acute events or PRN medications required overnight. Pt had 5th ECT session yesterday. She endorses feeling very confused and anxious for 10-15 minutes following the procedure and again felt nauseated for several hours afterwards. Currently endorses constipation, no other physical complaints. She is hesitant to have another session tomorrow because she wants to avoid these side effects. She is agreeable to skip ECT tomorrow and plan for 2 treatments per week going forward. Today she reports moderate improvement in mood. She continues to be distressed by the remorse and guilt about her suicide attempt. She denies current passive and active suicidality.

## 2022-03-31 NOTE — BH INPATIENT PSYCHIATRY PROGRESS NOTE - CURRENT MEDICATION
MEDICATIONS  (STANDING):  aspirin enteric coated 81 milliGRAM(s) Oral daily  influenza  Vaccine (HIGH DOSE) 0.7 milliLiter(s) IntraMuscular once  lamoTRIgine 150 milliGRAM(s) Oral <User Schedule>  levothyroxine 25 MICROGram(s) Oral daily  LORazepam     Tablet 0.5 milliGRAM(s) Oral <User Schedule>  multivitamin 1 Tablet(s) Oral daily  nicotine - 21 mG/24Hr(s) Patch 1 patch Transdermal daily  pantoprazole    Tablet 40 milliGRAM(s) Oral <User Schedule>  QUEtiapine 200 milliGRAM(s) Oral at bedtime  senna 2 Tablet(s) Oral at bedtime  sertraline 150 milliGRAM(s) Oral daily    MEDICATIONS  (PRN):  artificial tears (preservative free) Ophthalmic Solution 1 Drop(s) Left EYE four times a day PRN dry eyes  nicotine  Polacrilex Gum 2 milliGRAM(s) Oral every 2 hours PRN nicotine craving   MEDICATIONS  (STANDING):  aspirin enteric coated 81 milliGRAM(s) Oral daily  influenza  Vaccine (HIGH DOSE) 0.7 milliLiter(s) IntraMuscular once  lamoTRIgine 150 milliGRAM(s) Oral at bedtime  lamoTRIgine 150 milliGRAM(s) Oral <User Schedule>  levothyroxine 25 MICROGram(s) Oral daily  LORazepam     Tablet 0.5 milliGRAM(s) Oral <User Schedule>  multivitamin 1 Tablet(s) Oral daily  nicotine - 21 mG/24Hr(s) Patch 1 patch Transdermal daily  pantoprazole    Tablet 40 milliGRAM(s) Oral <User Schedule>  QUEtiapine 200 milliGRAM(s) Oral at bedtime  senna 2 Tablet(s) Oral at bedtime  sertraline 150 milliGRAM(s) Oral daily    MEDICATIONS  (PRN):  artificial tears (preservative free) Ophthalmic Solution 1 Drop(s) Left EYE four times a day PRN dry eyes  nicotine  Polacrilex Gum 2 milliGRAM(s) Oral every 2 hours PRN nicotine craving

## 2022-03-31 NOTE — BH INPATIENT PSYCHIATRY PROGRESS NOTE - NSBHASSESSSUMMFT_PSY_ALL_CORE
71-year-old woman with documented PPH of Bipolar 2 disorder (though questionable if hx is consistent with ahsan/hypomania), 2 prior suicide attempts (via OD), 2 psych admissions (1987, 1992), no h/o substance abuse, recently switched outpatient providers after her psychiatrist of 30 years (Dr. Kong) retired in late 2021, now transferred to [facility] from [facility] for management of depression after being stabilized for AMS after patient overdosed on 90 tabs of Ativan and several tabs of Lamictal and Seroquel iso 1 year of low mood, anxiety, feelings of worthlessness, hopelessness, and overwhelming loneliness. Patient has had ECT in the past, aim is to restart ECT during current hospitalization. Ms. Salazar appears to be unhappy with her new outpatient treatment team, idealizing Dr. Kong and describing her new psychiatrist and therapist as dismissive and uncaring. Ms. Salazar was tearful while sharing her story but expressed a sense of hope that with ECT she will once again feel like herself again.    Ms Salazar lives alone, she is , has no children, and is retired . She still volunteers sometimes in school and identifies her brother as her main source of support.    Pt now s/p 5 ECT sessions (3/18, 3/21, 3/25, 3/28, 3/30). Pt with 10-15 minutes of confusion/disorientation and several hours of nausea after last 2 sessions; will skip treatment tomorrow and plan for only 2 sessions weekly going forward. Patient is adherent with medications, denies side effects. Continues to express guilt about her SA and anxiety about the future. Thought content with some improvement in hopelessness and negative self-image as patient engages more in psychotherapy.     1. Legal: 9.27 admission  2. Safety: routine observation  3. Psychiatric:   -Continue Lamictal 150mg QHS, hold Lamictal the night before ECT (hold Sun and Thurs evenings)  -Continue Seroquel 200mg QHS  -Continue Ativan taper, last decreased on 3/24 - currently 0.5mg at 12PM, 0.5mg at 9PM  -sertraline 150mg, titrate as tolerated  -pantoprazole 6AM Mon/Fri prior to ECT to prevent nausea  4. Medical:   -Continue Synthroid 25mg daily, senna qhs, ASA 81mg daily  -Pt with elevation of TSH to 7.00 (up from 4.05) and bump in LFTs during medical hospitalization; plan to recheck with routine labs  -nicotine patch and PRN nicotine gum for cravings  -constipation, senna qhs, PRN MOM    3/19 Depressed, tolerated ECT, cont ECT 3/21 Cont other meds  3/20 Depressed anxious, no SI. Cont ECT will communicate above to psychologist  3/21: Patient continues to be mostly withdrawn. Continues to present overly catastrophic and negative thinking. Case discussed with rest of treatment team. Will continue ECT 3x/ week. Will continue titrating Sertraline and tapering Ativan as tolerated.    3/23: Hold ECT for hypotension, pt now with normal vitals.   3/24: Vitals stable. Patient agreeable to discontinue AM ativan dose to continue benzo taper.   3/25: 3rd ECT session  3/28: 4th ECT session, mild disorientation and nausea after procedure; increase sertraline to 150mg  3/30: 5th ECT session, endorses confusion and nausea after procedure  3/31: anxious about ECT side effects, will skip tx tomorrow

## 2022-03-31 NOTE — BH INPATIENT PSYCHIATRY PROGRESS NOTE - MSE UNSTRUCTURED FT
Patient is awake and alert. In hospital attire, appropriate grooming and hygiene. No PMR/PMA or other abnormal movements. Speech fluent. Mood is "nervous." Affect is anxious, full range, mood congruent. TP coherent, logical, overinclusive. No delusions. No perceptual disturbance. Denies any thoughts of hurting herself. Expresses extreme guilt over OD. Attention appropriate, cognition grossly intact. Insight and judgment limited. Impulse control intact.

## 2022-03-31 NOTE — BH INPATIENT PSYCHIATRY PROGRESS NOTE - NSBHCHARTREVIEWVS_PSY_A_CORE FT
Vital Signs Last 24 Hrs  T(C): 36.7 (03-31-22 @ 08:04), Max: 36.9 (03-30-22 @ 12:00)  T(F): 98.1 (03-31-22 @ 08:04), Max: 98.5 (03-30-22 @ 12:00)  HR: 74 (03-30-22 @ 12:00) (65 - 92)  BP: 122/79 (03-30-22 @ 12:00) (104/56 - 140/87)  BP(mean): --  RR: 18 (03-30-22 @ 12:00) (14 - 20)  SpO2: 100% (03-30-22 @ 12:00) (95% - 100%)    Orthostatic VS  03-31-22 @ 08:04  Lying BP: --/-- HR: --  Sitting BP: 93/69 HR: 76  Standing BP: --/-- HR: --  Site: upper left arm  Mode: electronic   Vital Signs Last 24 Hrs  T(C): 36.7 (03-31-22 @ 08:04), Max: 36.7 (03-31-22 @ 08:04)  T(F): 98.1 (03-31-22 @ 08:04), Max: 98.1 (03-31-22 @ 08:04)  HR: --  BP: --  BP(mean): --  RR: --  SpO2: --    Orthostatic VS  03-31-22 @ 08:04  Lying BP: --/-- HR: --  Sitting BP: 93/69 HR: 76  Standing BP: --/-- HR: --  Site: upper left arm  Mode: electronic

## 2022-04-01 PROCEDURE — 99232 SBSQ HOSP IP/OBS MODERATE 35: CPT

## 2022-04-01 PROCEDURE — 90834 PSYTX W PT 45 MINUTES: CPT

## 2022-04-01 RX ORDER — MAGNESIUM HYDROXIDE 400 MG/1
30 TABLET, CHEWABLE ORAL
Refills: 0 | Status: DISCONTINUED | OUTPATIENT
Start: 2022-04-01 | End: 2022-04-21

## 2022-04-01 RX ORDER — MAGNESIUM HYDROXIDE 400 MG/1
30 TABLET, CHEWABLE ORAL ONCE
Refills: 0 | Status: COMPLETED | OUTPATIENT
Start: 2022-04-01 | End: 2022-04-01

## 2022-04-01 RX ADMIN — Medication 0.5 MILLIGRAM(S): at 12:51

## 2022-04-01 RX ADMIN — Medication 1 PATCH: at 20:00

## 2022-04-01 RX ADMIN — Medication 1 PATCH: at 07:00

## 2022-04-01 RX ADMIN — SERTRALINE 150 MILLIGRAM(S): 25 TABLET, FILM COATED ORAL at 12:51

## 2022-04-01 RX ADMIN — SENNA PLUS 2 TABLET(S): 8.6 TABLET ORAL at 21:00

## 2022-04-01 RX ADMIN — LAMOTRIGINE 150 MILLIGRAM(S): 25 TABLET, ORALLY DISINTEGRATING ORAL at 21:00

## 2022-04-01 RX ADMIN — MAGNESIUM HYDROXIDE 30 MILLILITER(S): 400 TABLET, CHEWABLE ORAL at 14:11

## 2022-04-01 RX ADMIN — Medication 0.25 MILLIGRAM(S): at 21:00

## 2022-04-01 RX ADMIN — Medication 81 MILLIGRAM(S): at 10:34

## 2022-04-01 RX ADMIN — Medication 1 TABLET(S): at 10:34

## 2022-04-01 RX ADMIN — QUETIAPINE FUMARATE 200 MILLIGRAM(S): 200 TABLET, FILM COATED ORAL at 21:00

## 2022-04-01 RX ADMIN — Medication 25 MICROGRAM(S): at 06:40

## 2022-04-01 RX ADMIN — PANTOPRAZOLE SODIUM 40 MILLIGRAM(S): 20 TABLET, DELAYED RELEASE ORAL at 06:40

## 2022-04-01 RX ADMIN — Medication 1 PATCH: at 10:35

## 2022-04-01 NOTE — BH INPATIENT PSYCHIATRY PROGRESS NOTE - NSBHFUPINTERVALHXFT_PSY_A_CORE
Patient seen for follow-up for anxiety, depression and recent overdose. Patient is adherent with all medications on the unit. No acute events or PRN medications required overnight. Pt not going to ECT today due to reported side effects of nausea, confusion after last 2 treatments. Pt endorses constipation, estimates last BM 5 days ago. Taking senna qhs, had 1 dose of MOM yesterday without benefit. Requests additional MOM.     Today patient reports persistent anxiety about returning to her stressors at home after discharge. She says that she feels that her chronic negative thought patterns are not being addressed adequately here. Writer provided psychoeducation on short-term inpatient and long-term outpatient therapy goals  Patient seen for follow-up for anxiety, depression and recent overdose. Patient is adherent with all medications on the unit. No acute events or PRN medications required overnight. Pt not going to ECT today due to reported side effects of nausea, confusion after last 2 treatments. Pt endorses constipation, estimates last BM 5 days ago. Taking senna qhs, had 1 dose of MOM yesterday without benefit. Requests additional MOM.     Today patient reports persistent worry about returning to her stressors at home after discharge. She says that she feels that her chronic negative thought patterns are not being addressed adequately here but was reassured after writer provided psychoeducation about short-term inpatient vs long-term outpatient therapy goals. Patient was encouraged to speak with Dr. Rossi about her psychotherapy. Patient denies suicidality and continues to express guilt about her SA. she reports good sleep and normal appetite. She reports that over the past week her anxiety level has been "actually pretty good." She is hesitant to further decrease her Ativan but agrees to try decreasing bedtime dose and maintaining afternoon dose.

## 2022-04-01 NOTE — BH INPATIENT PSYCHIATRY PROGRESS NOTE - PRN MEDS
MEDICATIONS  (PRN):  artificial tears (preservative free) Ophthalmic Solution 1 Drop(s) Left EYE four times a day PRN dry eyes  magnesium hydroxide Suspension 30 milliLiter(s) Oral <User Schedule> PRN Constipation  magnesium hydroxide Suspension 30 milliLiter(s) Oral once PRN Constipation  nicotine  Polacrilex Gum 2 milliGRAM(s) Oral every 2 hours PRN nicotine craving   MEDICATIONS  (PRN):  artificial tears (preservative free) Ophthalmic Solution 1 Drop(s) Left EYE four times a day PRN dry eyes  magnesium hydroxide Suspension 30 milliLiter(s) Oral <User Schedule> PRN Constipation  nicotine  Polacrilex Gum 2 milliGRAM(s) Oral every 2 hours PRN nicotine craving

## 2022-04-01 NOTE — BH INPATIENT PSYCHIATRY PROGRESS NOTE - CURRENT MEDICATION
MEDICATIONS  (STANDING):  aspirin enteric coated 81 milliGRAM(s) Oral daily  influenza  Vaccine (HIGH DOSE) 0.7 milliLiter(s) IntraMuscular once  lamoTRIgine 150 milliGRAM(s) Oral <User Schedule>  levothyroxine 25 MICROGram(s) Oral daily  LORazepam     Tablet 0.5 milliGRAM(s) Oral <User Schedule>  LORazepam     Tablet 0.25 milliGRAM(s) Oral at bedtime  multivitamin 1 Tablet(s) Oral daily  nicotine - 21 mG/24Hr(s) Patch 1 patch Transdermal daily  pantoprazole    Tablet 40 milliGRAM(s) Oral <User Schedule>  QUEtiapine 200 milliGRAM(s) Oral at bedtime  senna 2 Tablet(s) Oral at bedtime  sertraline 150 milliGRAM(s) Oral daily    MEDICATIONS  (PRN):  artificial tears (preservative free) Ophthalmic Solution 1 Drop(s) Left EYE four times a day PRN dry eyes  magnesium hydroxide Suspension 30 milliLiter(s) Oral <User Schedule> PRN Constipation  magnesium hydroxide Suspension 30 milliLiter(s) Oral once PRN Constipation  nicotine  Polacrilex Gum 2 milliGRAM(s) Oral every 2 hours PRN nicotine craving   MEDICATIONS  (STANDING):  aspirin enteric coated 81 milliGRAM(s) Oral daily  influenza  Vaccine (HIGH DOSE) 0.7 milliLiter(s) IntraMuscular once  lamoTRIgine 150 milliGRAM(s) Oral <User Schedule>  levothyroxine 25 MICROGram(s) Oral daily  LORazepam     Tablet 0.5 milliGRAM(s) Oral <User Schedule>  LORazepam     Tablet 0.25 milliGRAM(s) Oral at bedtime  multivitamin 1 Tablet(s) Oral daily  nicotine - 21 mG/24Hr(s) Patch 1 patch Transdermal daily  pantoprazole    Tablet 40 milliGRAM(s) Oral <User Schedule>  QUEtiapine 200 milliGRAM(s) Oral at bedtime  senna 2 Tablet(s) Oral at bedtime  sertraline 150 milliGRAM(s) Oral daily    MEDICATIONS  (PRN):  artificial tears (preservative free) Ophthalmic Solution 1 Drop(s) Left EYE four times a day PRN dry eyes  magnesium hydroxide Suspension 30 milliLiter(s) Oral <User Schedule> PRN Constipation  nicotine  Polacrilex Gum 2 milliGRAM(s) Oral every 2 hours PRN nicotine craving

## 2022-04-01 NOTE — BH INPATIENT PSYCHIATRY PROGRESS NOTE - NSBHASSESSSUMMFT_PSY_ALL_CORE
71-year-old woman with documented PPH of Bipolar 2 disorder (though questionable if hx is consistent with ahsan/hypomania), 2 prior suicide attempts (via OD), 2 psych admissions (1987, 1992), no h/o substance abuse, recently switched outpatient providers after her psychiatrist of 30 years (Dr. Kong) retired in late 2021, now transferred to [facility] from [facility] for management of depression after being stabilized for AMS after patient overdosed on 90 tabs of Ativan and several tabs of Lamictal and Seroquel iso 1 year of low mood, anxiety, feelings of worthlessness, hopelessness, and overwhelming loneliness. Patient has had ECT in the past, aim is to restart ECT during current hospitalization. Ms. Salazar appears to be unhappy with her new outpatient treatment team, idealizing Dr. Kong and describing her new psychiatrist and therapist as dismissive and uncaring. Ms. Salazar was tearful while sharing her story but expressed a sense of hope that with ECT she will once again feel like herself again.    Ms Salazar lives alone, she is , has no children, and is retired . She still volunteers sometimes in school and identifies her brother as her main source of support.    Pt now s/p 5 ECT sessions (3/18, 3/21, 3/25, 3/28, 3/30). Pt with 10-15 minutes of confusion/disorientation and several hours of nausea after last 2 sessions; will skip treatment tomorrow and plan for only 2 sessions weekly going forward. Patient is adherent with medications, denies side effects. Continues to express guilt about her SA and anxiety about the future. Thought content with some improvement in hopelessness and negative self-image as patient engages more in psychotherapy.     1. Legal: 9.27 admission  2. Safety: routine observation  3. Psychiatric:   -Continue Lamictal 150mg QHS, hold Lamictal the night before ECT (hold Sun and Thurs evenings)  -Continue Seroquel 200mg QHS  -Continue Ativan taper, last decreased on 3/24 - currently 0.5mg at 12PM, 0.5mg at 9PM  -sertraline 150mg, titrate as tolerated  -pantoprazole 6AM Mon/Fri prior to ECT to prevent nausea  4. Medical:   -Continue Synthroid 25mg daily, senna qhs, ASA 81mg daily  -Pt with elevation of TSH to 7.00 (up from 4.05) and bump in LFTs during medical hospitalization; plan to recheck with routine labs  -nicotine patch and PRN nicotine gum for cravings  -constipation, senna qhs, PRN MOM    3/19 Depressed, tolerated ECT, cont ECT 3/21 Cont other meds  3/20 Depressed anxious, no SI. Cont ECT will communicate above to psychologist  3/21: Patient continues to be mostly withdrawn. Continues to present overly catastrophic and negative thinking. Case discussed with rest of treatment team. Will continue ECT 3x/ week. Will continue titrating Sertraline and tapering Ativan as tolerated.    3/23: Hold ECT for hypotension, pt now with normal vitals.   3/24: Vitals stable. Patient agreeable to discontinue AM ativan dose to continue benzo taper.   3/25: 3rd ECT session  3/28: 4th ECT session, mild disorientation and nausea after procedure; increase sertraline to 150mg  3/30: 5th ECT session, endorses confusion and nausea after procedure  3/31: anxious about ECT side effects, will skip tx tomorrow 71-year-old woman with documented PPH of Bipolar 2 disorder (though questionable if hx is consistent with ahsan/hypomania), 2 prior suicide attempts (via OD), 2 psych admissions (1987, 1992), no h/o substance abuse, recently switched outpatient providers after her psychiatrist of 30 years (Dr. Kong) retired in late 2021, now transferred to [facility] from [facility] for management of depression after being stabilized for AMS after patient overdosed on 90 tabs of Ativan and several tabs of Lamictal and Seroquel iso 1 year of low mood, anxiety, feelings of worthlessness, hopelessness, and overwhelming loneliness. Patient has had ECT in the past, aim is to restart ECT during current hospitalization. Ms. Salazar appears to be unhappy with her new outpatient treatment team, idealizing Dr. Kong and describing her new psychiatrist and therapist as dismissive and uncaring. Ms. Salazar was tearful while sharing her story but expressed a sense of hope that with ECT she will once again feel like herself again.    Ms Salazar lives alone, she is , has no children, and is retired . She still volunteers sometimes in school and identifies her brother as her main source of support.    Pt now s/p 5 ECT sessions (3/18, 3/21, 3/25, 3/28, 3/30). Pt with 10-15 minutes of confusion/disorientation and several hours of nausea after last 2 sessions; ECT physicians alerted of SE and we will plan for only 2 sessions weekly going forward. Patient is adherent with medications, denies side effects. Continues to express guilt about her SA and anxiety about the future. Thought content with some improvement in hopelessness and negative self-image as patient engages more in psychotherapy. Continues to seek reassurance frequently from all team members. Hesitant but agreeable to decrease bedtime Ativan dose to continue taper (last decreased 3/24).     1. Legal: 9.27 admission  2. Safety: routine observation  3. Psychiatric:   -Continue Lamictal 150mg QHS, hold Lamictal the night before ECT (hold Sun and Thurs evenings)  -Continue Seroquel 200mg QHS  -Continue Ativan taper, Ativan 0.5mg at 12PM and decrease bedtime dose to 0.25mg  -sertraline 150mg, titrate as tolerated  -pantoprazole 6AM Mon/Fri prior to ECT to prevent nausea  4. Medical:   -Continue Synthroid 25mg daily, senna qhs, ASA 81mg daily  -Pt with elevation of TSH to 7.00 (up from 4.05) and bump in LFTs during medical hospitalization; plan to recheck with routine labs  -nicotine patch and PRN nicotine gum for cravings  -constipation, senna qhs, PRN MOM    3/19 Depressed, tolerated ECT, cont ECT 3/21 Cont other meds  3/20 Depressed anxious, no SI. Cont ECT will communicate above to psychologist  3/21: Patient continues to be mostly withdrawn. Continues to present overly catastrophic and negative thinking. Case discussed with rest of treatment team. Will continue ECT 3x/ week. Will continue titrating Sertraline and tapering Ativan as tolerated.    3/23: Hold ECT for hypotension, pt now with normal vitals.   3/24: Vitals stable. Patient agreeable to discontinue AM ativan dose to continue benzo taper.   3/25: 3rd ECT session  3/28: 4th ECT session, mild disorientation and nausea after procedure; increase sertraline to 150mg  3/30: 5th ECT session, endorses confusion and nausea after procedure  3/31: anxious about ECT side effects, will skip tx tomorrow  4/1: decrease bedtime ativan dose

## 2022-04-01 NOTE — BH INPATIENT PSYCHIATRY PROGRESS NOTE - NSBHCHARTREVIEWVS_PSY_A_CORE FT
Vital Signs Last 24 Hrs  T(C): 36.5 (04-01-22 @ 08:07), Max: 36.5 (04-01-22 @ 08:07)  T(F): 97.7 (04-01-22 @ 08:07), Max: 97.7 (04-01-22 @ 08:07)  HR: --  BP: --  BP(mean): --  RR: --  SpO2: --    Orthostatic VS  04-01-22 @ 08:07  Lying BP: --/-- HR: --  Sitting BP: 94/68 HR: 74  Standing BP: --/-- HR: --  Site: upper right arm  Mode: electronic  Orthostatic VS  03-31-22 @ 08:04  Lying BP: --/-- HR: --  Sitting BP: 93/69 HR: 76  Standing BP: --/-- HR: --  Site: upper left arm  Mode: electronic

## 2022-04-01 NOTE — BH INPATIENT PSYCHIATRY PROGRESS NOTE - MSE UNSTRUCTURED FT
Patient is awake and alert. In hospital attire, appropriate grooming and hygiene. No PMR/PMA or other abnormal movements. Speech fluent. Mood is "nervous." Affect is anxious, full range, mood congruent. TP coherent, logical, overinclusive. No delusions. No perceptual disturbance. Denies any thoughts of hurting herself. Expresses extreme guilt over OD. Attention appropriate, cognition grossly intact. Insight and judgment limited. Impulse control intact.  Patient is awake and alert. In hospital attire, appropriate grooming and hygiene. Cooperative, reassurance-seeking. No PMR/PMA or other abnormal movements. Speech fluent. Mood is "actually okay." Affect is anxious, full range, appropriate. TP coherent, logical, overinclusive. No delusions. No perceptual disturbance. Denies any thoughts of hurting herself. Expresses extreme guilt over OD. Persistent negative self-talk. Attention appropriate, cognition grossly intact. Insight and judgment limited. Impulse control intact.

## 2022-04-02 PROCEDURE — 99232 SBSQ HOSP IP/OBS MODERATE 35: CPT

## 2022-04-02 RX ORDER — POLYETHYLENE GLYCOL 3350 17 G/17G
17 POWDER, FOR SOLUTION ORAL
Refills: 0 | Status: DISCONTINUED | OUTPATIENT
Start: 2022-04-02 | End: 2022-04-21

## 2022-04-02 RX ADMIN — LAMOTRIGINE 150 MILLIGRAM(S): 25 TABLET, ORALLY DISINTEGRATING ORAL at 21:24

## 2022-04-02 RX ADMIN — Medication 1 PATCH: at 06:51

## 2022-04-02 RX ADMIN — QUETIAPINE FUMARATE 200 MILLIGRAM(S): 200 TABLET, FILM COATED ORAL at 21:09

## 2022-04-02 RX ADMIN — Medication 81 MILLIGRAM(S): at 09:24

## 2022-04-02 RX ADMIN — Medication 1 TABLET(S): at 09:24

## 2022-04-02 RX ADMIN — SERTRALINE 150 MILLIGRAM(S): 25 TABLET, FILM COATED ORAL at 12:16

## 2022-04-02 RX ADMIN — SENNA PLUS 2 TABLET(S): 8.6 TABLET ORAL at 21:09

## 2022-04-02 RX ADMIN — Medication 0.5 MILLIGRAM(S): at 12:16

## 2022-04-02 RX ADMIN — Medication 1 PATCH: at 18:27

## 2022-04-02 RX ADMIN — Medication 10 MILLIGRAM(S): at 14:57

## 2022-04-02 RX ADMIN — Medication 0.25 MILLIGRAM(S): at 21:08

## 2022-04-02 RX ADMIN — Medication 25 MICROGRAM(S): at 06:54

## 2022-04-02 RX ADMIN — Medication 1 PATCH: at 09:24

## 2022-04-02 RX ADMIN — POLYETHYLENE GLYCOL 3350 17 GRAM(S): 17 POWDER, FOR SOLUTION ORAL at 12:16

## 2022-04-02 RX ADMIN — Medication 1 PATCH: at 11:14

## 2022-04-02 NOTE — BH INPATIENT PSYCHIATRY PROGRESS NOTE - PRN MEDS
MEDICATIONS  (PRN):  artificial tears (preservative free) Ophthalmic Solution 1 Drop(s) Left EYE four times a day PRN dry eyes  bisacodyl Suppository 10 milliGRAM(s) Rectal daily PRN constipation  magnesium hydroxide Suspension 30 milliLiter(s) Oral <User Schedule> PRN Constipation  nicotine  Polacrilex Gum 2 milliGRAM(s) Oral every 2 hours PRN nicotine craving

## 2022-04-02 NOTE — BH INPATIENT PSYCHIATRY PROGRESS NOTE - NSBHASSESSSUMMFT_PSY_ALL_CORE
71-year-old woman with documented PPH of Bipolar 2 disorder (though questionable if hx is consistent with ahsan/hypomania), 2 prior suicide attempts (via OD), 2 psych admissions (1987, 1992), no h/o substance abuse, recently switched outpatient providers after her psychiatrist of 30 years (Dr. Kong) retired in late 2021, now transferred to [facility] from [facility] for management of depression after being stabilized for AMS after patient overdosed on 90 tabs of Ativan and several tabs of Lamictal and Seroquel iso 1 year of low mood, anxiety, feelings of worthlessness, hopelessness, and overwhelming loneliness. Patient has had ECT in the past, aim is to restart ECT during current hospitalization. Ms. Salazar appears to be unhappy with her new outpatient treatment team, idealizing Dr. Kong and describing her new psychiatrist and therapist as dismissive and uncaring. Ms. Salazar was tearful while sharing her story but expressed a sense of hope that with ECT she will once again feel like herself again.    Ms Salazar lives alone, she is , has no children, and is retired . She still volunteers sometimes in school and identifies her brother as her main source of support.    Pt now s/p 5 ECT sessions (3/18, 3/21, 3/25, 3/28, 3/30). Pt with 10-15 minutes of confusion/disorientation and several hours of nausea after last 2 sessions; ECT physicians alerted of SE and we will plan for only 2 sessions weekly going forward. Patient is adherent with medications, denies side effects. Continues to express guilt about her SA and anxiety about the future. Thought content with some improvement in hopelessness and negative self-image as patient engages more in psychotherapy. Continues to seek reassurance frequently from all team members. Hesitant but agreeable to decrease bedtime Ativan dose to continue taper (last decreased 3/24).     1. Legal: 9.27 admission  2. Safety: routine observation  3. Psychiatric:   -Continue Lamictal 150mg QHS, hold Lamictal the night before ECT (hold Sun and Thurs evenings)  -Continue Seroquel 200mg QHS  -Continue Ativan taper, Ativan 0.5mg at 12PM and decrease bedtime dose to 0.25mg  -sertraline 150mg, titrate as tolerated  -pantoprazole 6AM Mon/Fri prior to ECT to prevent nausea  4. Medical:   -Continue Synthroid 25mg daily, senna qhs, ASA 81mg daily  -Pt with elevation of TSH to 7.00 (up from 4.05) and bump in LFTs during medical hospitalization; plan to recheck with routine labs  -nicotine patch and PRN nicotine gum for cravings  -constipation, senna qhs, PRN MOM    3/19 Depressed, tolerated ECT, cont ECT 3/21 Cont other meds  3/20 Depressed anxious, no SI. Cont ECT will communicate above to psychologist  3/21: Patient continues to be mostly withdrawn. Continues to present overly catastrophic and negative thinking. Case discussed with rest of treatment team. Will continue ECT 3x/ week. Will continue titrating Sertraline and tapering Ativan as tolerated.    3/23: Hold ECT for hypotension, pt now with normal vitals.   3/24: Vitals stable. Patient agreeable to discontinue AM ativan dose to continue benzo taper.   3/25: 3rd ECT session  3/28: 4th ECT session, mild disorientation and nausea after procedure; increase sertraline to 150mg  3/30: 5th ECT session, endorses confusion and nausea after procedure  3/31: anxious about ECT side effects, will skip tx tomorrow  4/1: decrease bedtime ativan dose  4/2 Improving with ECT, next 4/4 Will add laxatives, cont to reduce Ativan

## 2022-04-02 NOTE — BH INPATIENT PSYCHIATRY PROGRESS NOTE - NSBHFUPINTERVALHXFT_PSY_A_CORE
Patient seen for follow-up for anxiety, depression and recent overdose. Patient is adherent with all medications on the unit. No acute events or PRN medications required overnight. Pt not going to ECT today due to reported side effects of nausea, confusion after last 2 treatments. Pt endorses constipation, estimates last BM 5 days ago. Taking senna qhs, had 1 dose of MOM yesterday without benefit. Requests additional MOM.     Today patient reports persistent worry about returning to her stressors at home after discharge. She says that she feels that her chronic negative thought patterns are not being addressed adequately here but was reassured after writer provided psychoeducation about short-term inpatient vs long-term outpatient therapy goals. Patient was encouraged to speak with Dr. Rossi about her psychotherapy. Patient denies suicidality and continues to express guilt about her SA. she reports good sleep and normal appetite. She reports that over the past week her anxiety level has been "actually pretty good." She is hesitant to further decrease her Ativan but agrees to try decreasing bedtime dose and maintaining afternoon dose.   4/1 Patient seen for depression no overnight events. eating sleeping well, reports feeling clearer with pause of ECT VSS

## 2022-04-02 NOTE — BH INPATIENT PSYCHIATRY PROGRESS NOTE - CURRENT MEDICATION
MEDICATIONS  (STANDING):  aspirin enteric coated 81 milliGRAM(s) Oral daily  influenza  Vaccine (HIGH DOSE) 0.7 milliLiter(s) IntraMuscular once  lamoTRIgine 150 milliGRAM(s) Oral <User Schedule>  levothyroxine 25 MICROGram(s) Oral daily  LORazepam     Tablet 0.25 milliGRAM(s) Oral at bedtime  LORazepam     Tablet 0.5 milliGRAM(s) Oral <User Schedule>  multivitamin 1 Tablet(s) Oral daily  nicotine - 21 mG/24Hr(s) Patch 1 patch Transdermal daily  pantoprazole    Tablet 40 milliGRAM(s) Oral <User Schedule>  polyethylene glycol 3350 17 Gram(s) Oral <User Schedule>  QUEtiapine 200 milliGRAM(s) Oral at bedtime  senna 2 Tablet(s) Oral at bedtime  sertraline 150 milliGRAM(s) Oral daily    MEDICATIONS  (PRN):  artificial tears (preservative free) Ophthalmic Solution 1 Drop(s) Left EYE four times a day PRN dry eyes  bisacodyl Suppository 10 milliGRAM(s) Rectal daily PRN constipation  magnesium hydroxide Suspension 30 milliLiter(s) Oral <User Schedule> PRN Constipation  nicotine  Polacrilex Gum 2 milliGRAM(s) Oral every 2 hours PRN nicotine craving

## 2022-04-02 NOTE — BH INPATIENT PSYCHIATRY PROGRESS NOTE - MSE UNSTRUCTURED FT
Patient is awake and alert. In hospital attire, appropriate grooming and hygiene. Cooperative, reassurance-seeking. No PMR/PMA or other abnormal movements. Speech fluent. Mood is "actually okay." Affect is anxious, full range, appropriate. TP coherent, logical, overinclusive. No delusions. No perceptual disturbance. Denies any thoughts of hurting herself. Expresses ruminative guilt over OD. . Attention appropriate, cognition grossly intact. Insight and judgment limited. Impulse control intact.

## 2022-04-02 NOTE — BH INPATIENT PSYCHIATRY PROGRESS NOTE - NSBHCHARTREVIEWVS_PSY_A_CORE FT
Vital Signs Last 24 Hrs  T(C): 36.8 (04-02-22 @ 08:12), Max: 36.8 (04-02-22 @ 08:12)  T(F): 98.3 (04-02-22 @ 08:12), Max: 98.3 (04-02-22 @ 08:12)  HR: --  BP: --  BP(mean): --  RR: --  SpO2: --    Orthostatic VS  04-02-22 @ 08:12  Lying BP: --/-- HR: --  Sitting BP: 109/74 HR: 76  Standing BP: 80/62 HR: 75  Site: upper left arm  Mode: electronic  Orthostatic VS  04-01-22 @ 08:07  Lying BP: --/-- HR: --  Sitting BP: 94/68 HR: 74  Standing BP: --/-- HR: --  Site: upper right arm  Mode: electronic

## 2022-04-03 LAB — SARS-COV-2 RNA SPEC QL NAA+PROBE: SIGNIFICANT CHANGE UP

## 2022-04-03 PROCEDURE — 99232 SBSQ HOSP IP/OBS MODERATE 35: CPT

## 2022-04-03 RX ORDER — DOCOSANOL 100 MG/G
1 CREAM TOPICAL EVERY 6 HOURS
Refills: 0 | Status: DISCONTINUED | OUTPATIENT
Start: 2022-04-03 | End: 2022-04-21

## 2022-04-03 RX ADMIN — Medication 1 TABLET(S): at 09:07

## 2022-04-03 RX ADMIN — Medication 1 PATCH: at 09:07

## 2022-04-03 RX ADMIN — DOCOSANOL 1 APPLICATION(S): 100 CREAM TOPICAL at 13:26

## 2022-04-03 RX ADMIN — Medication 0.25 MILLIGRAM(S): at 20:16

## 2022-04-03 RX ADMIN — POLYETHYLENE GLYCOL 3350 17 GRAM(S): 17 POWDER, FOR SOLUTION ORAL at 13:22

## 2022-04-03 RX ADMIN — Medication 81 MILLIGRAM(S): at 09:07

## 2022-04-03 RX ADMIN — MAGNESIUM HYDROXIDE 30 MILLILITER(S): 400 TABLET, CHEWABLE ORAL at 13:21

## 2022-04-03 RX ADMIN — Medication 0.5 MILLIGRAM(S): at 13:22

## 2022-04-03 RX ADMIN — Medication 25 MICROGRAM(S): at 06:25

## 2022-04-03 RX ADMIN — QUETIAPINE FUMARATE 200 MILLIGRAM(S): 200 TABLET, FILM COATED ORAL at 20:17

## 2022-04-03 RX ADMIN — SERTRALINE 150 MILLIGRAM(S): 25 TABLET, FILM COATED ORAL at 13:21

## 2022-04-03 RX ADMIN — SENNA PLUS 2 TABLET(S): 8.6 TABLET ORAL at 20:16

## 2022-04-03 NOTE — BH INPATIENT PSYCHIATRY PROGRESS NOTE - PRN MEDS
MEDICATIONS  (PRN):  artificial tears (preservative free) Ophthalmic Solution 1 Drop(s) Left EYE four times a day PRN dry eyes  bisacodyl Suppository 10 milliGRAM(s) Rectal daily PRN constipation  docosanol 10% Cream 1 Application(s) Topical every 6 hours PRN cold sore  magnesium hydroxide Suspension 30 milliLiter(s) Oral <User Schedule> PRN Constipation  nicotine  Polacrilex Gum 2 milliGRAM(s) Oral every 2 hours PRN nicotine craving

## 2022-04-03 NOTE — BH INPATIENT PSYCHIATRY PROGRESS NOTE - NSBHASSESSSUMMFT_PSY_ALL_CORE
71-year-old woman with documented PPH of Bipolar 2 disorder (though questionable if hx is consistent with ahsan/hypomania), 2 prior suicide attempts (via OD), 2 psych admissions (1987, 1992), no h/o substance abuse, recently switched outpatient providers after her psychiatrist of 30 years (Dr. Kong) retired in late 2021, now transferred to [facility] from [facility] for management of depression after being stabilized for AMS after patient overdosed on 90 tabs of Ativan and several tabs of Lamictal and Seroquel iso 1 year of low mood, anxiety, feelings of worthlessness, hopelessness, and overwhelming loneliness. Patient has had ECT in the past, aim is to restart ECT during current hospitalization. Ms. Salazar appears to be unhappy with her new outpatient treatment team, idealizing Dr. Kong and describing her new psychiatrist and therapist as dismissive and uncaring. Ms. Salazar was tearful while sharing her story but expressed a sense of hope that with ECT she will once again feel like herself again.    Ms Salazar lives alone, she is , has no children, and is retired . She still volunteers sometimes in school and identifies her brother as her main source of support.    Pt now s/p 5 ECT sessions (3/18, 3/21, 3/25, 3/28, 3/30). Pt with 10-15 minutes of confusion/disorientation and several hours of nausea after last 2 sessions; ECT physicians alerted of SE and we will plan for only 2 sessions weekly going forward. Patient is adherent with medications, denies side effects. Continues to express guilt about her SA and anxiety about the future. Thought content with some improvement in hopelessness and negative self-image as patient engages more in psychotherapy. Continues to seek reassurance frequently from all team members. Hesitant but agreeable to decrease bedtime Ativan dose to continue taper (last decreased 3/24).     1. Legal: 9.27 admission  2. Safety: routine observation  3. Psychiatric:   -Continue Lamictal 150mg QHS, hold Lamictal the night before ECT (hold Sun and Thurs evenings)  -Continue Seroquel 200mg QHS  -Continue Ativan taper, Ativan 0.5mg at 12PM and decrease bedtime dose to 0.25mg  -sertraline 150mg, titrate as tolerated  -pantoprazole 6AM Mon/Fri prior to ECT to prevent nausea  4. Medical:   -Continue Synthroid 25mg daily, senna qhs, ASA 81mg daily  -Pt with elevation of TSH to 7.00 (up from 4.05) and bump in LFTs during medical hospitalization; plan to recheck with routine labs  -nicotine patch and PRN nicotine gum for cravings  -constipation, senna qhs, PRN MOM    3/19 Depressed, tolerated ECT, cont ECT 3/21 Cont other meds  3/20 Depressed anxious, no SI. Cont ECT will communicate above to psychologist  3/21: Patient continues to be mostly withdrawn. Continues to present overly catastrophic and negative thinking. Case discussed with rest of treatment team. Will continue ECT 3x/ week. Will continue titrating Sertraline and tapering Ativan as tolerated.    3/23: Hold ECT for hypotension, pt now with normal vitals.   3/24: Vitals stable. Patient agreeable to discontinue AM ativan dose to continue benzo taper.   3/25: 3rd ECT session  3/28: 4th ECT session, mild disorientation and nausea after procedure; increase sertraline to 150mg  3/30: 5th ECT session, endorses confusion and nausea after procedure  3/31: anxious about ECT side effects, will skip tx tomorrow  4/1: decrease bedtime ativan dose  4/2 Improving with ECT, next 4/4 Will add laxatives, cont to reduce Ativan  4/3 Improved, denies Si. Cont ECT will add Dulcolax dose today can use Dulcolax supp today too. Patient may have used sig  laxatives at home

## 2022-04-03 NOTE — BH INPATIENT PSYCHIATRY PROGRESS NOTE - CURRENT MEDICATION
MEDICATIONS  (STANDING):  aspirin enteric coated 81 milliGRAM(s) Oral daily  bisacodyl 5 milliGRAM(s) Oral once  influenza  Vaccine (HIGH DOSE) 0.7 milliLiter(s) IntraMuscular once  lamoTRIgine 150 milliGRAM(s) Oral <User Schedule>  levothyroxine 25 MICROGram(s) Oral daily  LORazepam     Tablet 0.25 milliGRAM(s) Oral at bedtime  LORazepam     Tablet 0.5 milliGRAM(s) Oral <User Schedule>  multivitamin 1 Tablet(s) Oral daily  nicotine - 21 mG/24Hr(s) Patch 1 patch Transdermal daily  pantoprazole    Tablet 40 milliGRAM(s) Oral <User Schedule>  polyethylene glycol 3350 17 Gram(s) Oral <User Schedule>  QUEtiapine 200 milliGRAM(s) Oral at bedtime  senna 2 Tablet(s) Oral at bedtime  sertraline 150 milliGRAM(s) Oral daily    MEDICATIONS  (PRN):  artificial tears (preservative free) Ophthalmic Solution 1 Drop(s) Left EYE four times a day PRN dry eyes  bisacodyl Suppository 10 milliGRAM(s) Rectal daily PRN constipation  docosanol 10% Cream 1 Application(s) Topical every 6 hours PRN cold sore  magnesium hydroxide Suspension 30 milliLiter(s) Oral <User Schedule> PRN Constipation  nicotine  Polacrilex Gum 2 milliGRAM(s) Oral every 2 hours PRN nicotine craving

## 2022-04-03 NOTE — BH INPATIENT PSYCHIATRY PROGRESS NOTE - NSBHCHARTREVIEWVS_PSY_A_CORE FT
Vital Signs Last 24 Hrs  T(C): --  T(F): --  HR: --  BP: --  BP(mean): --  RR: --  SpO2: --    Orthostatic VS  04-02-22 @ 08:12  Lying BP: --/-- HR: --  Sitting BP: 109/74 HR: 76  Standing BP: 80/62 HR: 75  Site: upper left arm  Mode: electronic

## 2022-04-03 NOTE — BH INPATIENT PSYCHIATRY PROGRESS NOTE - NSBHFUPINTERVALHXFT_PSY_A_CORE
Patient seen for follow-up for anxiety, depression and recent overdose. Patient is adherent with all medications on the unit. No acute events or PRN medications required overnight. Pt not going to ECT today due to reported side effects of nausea, confusion after last 2 treatments. Pt endorses constipation, estimates last BM 5 days ago. Taking senna qhs, had 1 dose of MOM yesterday without benefit. Requests additional MOM.     Today patient reports persistent worry about returning to her stressors at home after discharge. She says that she feels that her chronic negative thought patterns are not being addressed adequately here but was reassured after writer provided psychoeducation about short-term inpatient vs long-term outpatient therapy goals. Patient was encouraged to speak with Dr. Rossi about her psychotherapy. Patient denies suicidality and continues to express guilt about her SA. she reports good sleep and normal appetite. She reports that over the past week her anxiety level has been "actually pretty good." She is hesitant to further decrease her Ativan but agrees to try decreasing bedtime dose and maintaining afternoon dose.   4/3 Patient seen for depression no overnight events. eating sleeping well, reports feeling clearer with pause of ECT VSS.

## 2022-04-03 NOTE — BH INPATIENT PSYCHIATRY PROGRESS NOTE - MSE UNSTRUCTURED FT
Patient is awake and alert. In hospital attire, appropriate grooming and hygiene. Cooperative, reassurance-seeking. No PMR/PMA or other abnormal movements. Speech fluent. Mood is "actually okay." Affect is anxious, full range, appropriate. TP coherent, logical, overinclusive. No delusions. No perceptual disturbance. Denies any thoughts of hurting herself. Expresses ruminative guilt over OD. Somatically focused . Attention appropriate, cognition grossly intact. Insight and judgment limited. Impulse control intact.

## 2022-04-04 PROCEDURE — 90832 PSYTX W PT 30 MINUTES: CPT

## 2022-04-04 PROCEDURE — 99232 SBSQ HOSP IP/OBS MODERATE 35: CPT | Mod: 25

## 2022-04-04 RX ORDER — FLUMAZENIL 0.1 MG/ML
0.2 VIAL (ML) INTRAVENOUS ONCE
Refills: 0 | Status: COMPLETED | OUTPATIENT
Start: 2022-04-04 | End: 2022-04-04

## 2022-04-04 RX ADMIN — LAMOTRIGINE 150 MILLIGRAM(S): 25 TABLET, ORALLY DISINTEGRATING ORAL at 21:52

## 2022-04-04 RX ADMIN — Medication 10 MILLIGRAM(S): at 23:04

## 2022-04-04 RX ADMIN — Medication 0.25 MILLIGRAM(S): at 20:47

## 2022-04-04 RX ADMIN — Medication 81 MILLIGRAM(S): at 09:31

## 2022-04-04 RX ADMIN — SENNA PLUS 2 TABLET(S): 8.6 TABLET ORAL at 20:47

## 2022-04-04 RX ADMIN — SERTRALINE 150 MILLIGRAM(S): 25 TABLET, FILM COATED ORAL at 12:57

## 2022-04-04 RX ADMIN — Medication 0.5 MILLIGRAM(S): at 12:57

## 2022-04-04 RX ADMIN — Medication 25 MICROGRAM(S): at 06:09

## 2022-04-04 RX ADMIN — Medication 0.2 MILLIGRAM(S): at 08:30

## 2022-04-04 RX ADMIN — POLYETHYLENE GLYCOL 3350 17 GRAM(S): 17 POWDER, FOR SOLUTION ORAL at 12:57

## 2022-04-04 RX ADMIN — Medication 1 PATCH: at 09:30

## 2022-04-04 RX ADMIN — QUETIAPINE FUMARATE 200 MILLIGRAM(S): 200 TABLET, FILM COATED ORAL at 20:47

## 2022-04-04 RX ADMIN — PANTOPRAZOLE SODIUM 40 MILLIGRAM(S): 20 TABLET, DELAYED RELEASE ORAL at 06:12

## 2022-04-04 RX ADMIN — Medication 5 MILLIGRAM(S): at 18:50

## 2022-04-04 RX ADMIN — Medication 1 TABLET(S): at 09:31

## 2022-04-04 NOTE — ECT PRE-PROCEDURE CHECKLIST - INV PERIPH IV LOCATION
Right:/median cubital vein
Right:/median cubital vein
Right:/basilic vein
Right:/median cubital vein

## 2022-04-04 NOTE — BH PSYCHOLOGY - CLINICIAN PSYCHOTHERAPY NOTE - NSBHPSYCHOLADHERE_PSY_A_CORE FT
Adherence is fair as evidenced by patient's demonstrated tendency to engage in avoidant coping strategies.
Patient did not complete homework from previous session. Will continue to assess impediments to completing homework as they may interfere with her progress.

## 2022-04-04 NOTE — BH INPATIENT PSYCHIATRY PROGRESS NOTE - MSE UNSTRUCTURED FT
Patient is awake and alert. In hospital attire, appropriate grooming and hygiene. Cooperative, reassurance-seeking. No PMR/PMA or other abnormal movements. Speech fluent. Mood is "okay, I'm not sure." Affect is anxious, full range, appropriate. TP coherent, logical, overinclusive. No delusions. No perceptual disturbance. Denies any thoughts of hurting herself. Somatically focused. Some memory loss around recent SA, names of medications, details of care. Insight and judgment limited. Impulse control intact.

## 2022-04-04 NOTE — BH INPATIENT PSYCHIATRY PROGRESS NOTE - NSBHCHARTREVIEWVS_PSY_A_CORE FT
Vital Signs Last 24 Hrs  T(C): 37 (04-04-22 @ 08:39), Max: 37 (04-04-22 @ 00:45)  T(F): 98.6 (04-04-22 @ 08:39), Max: 98.6 (04-04-22 @ 00:45)  HR: 86 (04-04-22 @ 09:20) (66 - 89)  BP: 118/78 (04-04-22 @ 09:20) (116/75 - 146/79)  BP(mean): --  RR: 17 (04-04-22 @ 09:20) (8 - 20)  SpO2: 98% (04-04-22 @ 09:20) (96% - 100%)    Orthostatic VS  04-04-22 @ 09:52  Lying BP: --/-- HR: --  Sitting BP: 108/75 HR: 87  Standing BP: --/-- HR: --  Site: --  Mode: --  Orthostatic VS  04-04-22 @ 06:49  Lying BP: --/-- HR: --  Sitting BP: 97/63 HR: --  Standing BP: --/-- HR: --  Site: --  Mode: --

## 2022-04-04 NOTE — BH INPATIENT PSYCHIATRY PROGRESS NOTE - NSBHFUPINTERVALHXFT_PSY_A_CORE
Patient seen for follow-up for anxiety, depression and recent overdose. Patient is adherent with all medications on the unit. No acute events or PRN medications required overnight. Tolerating decrease in bedtime Ativan dose. Pt had 6th ECT session this morning. She endorses mild nausea following the procedure, now resolved. Also endorses constipation despite getting Miralax, MOM, senna, and dulcolax PO and suppository. The patient was seen shortly after ECT this morning and endorses some confusion and memory loss of recent events including her recent SA and reason for hospitalization. Will continue to monitor, expect mental fogginess will clear in several hours. The patient reports feeling her mood is improving and is becoming more confident in using coping skills and doing her psychotherapy homework. She denies SI and hopelessness.

## 2022-04-04 NOTE — UM REPORT PROGRESS NOTE - NSUMSWACTION_GEN_A_CORE FT
Frequent meetings with pt and phone calls with pt's brother, for treatment updates, and ongoing DC planning.

## 2022-04-04 NOTE — UM REPORT PROGRESS NOTE - NSUMSWNOTE_GEN_A_CORE FT
Pt has completed her inpatient ECT course.  Pt is concerned about returning home alone immediately after DC, where she attempted suicide prompting this admission.  As per pt's brother, she is not able to stay with him.  ALFs are being explored where pt would attend PHP, but would have access to support and socialization.  Pt and brother have asked writer to look at an inpatient rehab in [place] ([place]), where pt would have access to psychiatric/therapy and addiction services.  Brother feels pt will have difficulty being off Ativan for the first time in many years.  Pt also expresses anxiety about this, and her general ability to function at DC.  Pt signed HIPAAs and writer is following through with the referral.  Pt clinically accepted to our PHP with start date of 4/20. Plan will be clarified in the next few days.

## 2022-04-04 NOTE — BH INPATIENT PSYCHIATRY PROGRESS NOTE - NSBHASSESSSUMMFT_PSY_ALL_CORE
71-year-old woman with documented PPH of Bipolar 2 disorder (though questionable if hx is consistent with ahsan/hypomania), 2 prior suicide attempts (via OD), 2 psych admissions (1987, 1992), no h/o substance abuse, recently switched outpatient providers after her psychiatrist of 30 years (Dr. Kong) retired in late 2021, now transferred to [facility] from [facility] for management of depression after being stabilized for AMS after patient overdosed on 90 tabs of Ativan and several tabs of Lamictal and Seroquel iso 1 year of low mood, anxiety, feelings of worthlessness, hopelessness, and overwhelming loneliness. Patient has had ECT in the past, aim is to restart ECT during current hospitalization. Ms. Salazar appears to be unhappy with her new outpatient treatment team, idealizing Dr. Kong and describing her new psychiatrist and therapist as dismissive and uncaring. Ms. Salazar was tearful while sharing her story but expressed a sense of hope that with ECT she will once again feel like herself again.    Ms Salazar lives alone, she is , has no children, and is retired . She still volunteers sometimes in school and identifies her brother as her main source of support.    Pt now s/p 6 ECT sessions (3/18, 3/21, 3/25, 3/28, 3/30, 4/4), today with some mental fogginess after procedure. Plan for 2 sessions/week to limit cognitive side effects. Patient is adherent with medications, denies side effects. Continues to express guilt about her SA and anxiety about the future. Thought content with some improvement in hopelessness and negative self-image as patient engages more in psychotherapy. Continues to seek reassurance frequently from all team members. Hesitant but agreeable to decrease bedtime Ativan dose to continue taper (last decreased 4/1).     1. Legal: 9.27 admission  2. Safety: routine observation  3. Psychiatric:   -Continue Lamictal 150mg QHS, hold Lamictal the night before ECT (hold Sun and Thurs evenings)  -Continue Seroquel 200mg QHS  -Continue Ativan taper, Ativan 0.5mg at 12PM, Ativan 0.25mg qhs  -sertraline 150mg, titrate as tolerated  -pantoprazole 6AM Mon/Fri prior to ECT to prevent nausea  4. Medical:   -Continue Synthroid 25mg daily, senna qhs, ASA 81mg daily  -Pt with elevation of TSH to 7.00 (up from 4.05) and bump in LFTs during medical hospitalization; plan to recheck with routine labs  -nicotine patch and PRN nicotine gum for cravings  -constipation, senna qhs, PRN MOM, PRN dulcolax    3/19 Depressed, tolerated ECT, cont ECT 3/21 Cont other meds  3/20 Depressed anxious, no SI. Cont ECT will communicate above to psychologist  3/21: Patient continues to be mostly withdrawn. Continues to present overly catastrophic and negative thinking. Case discussed with rest of treatment team. Will continue ECT 3x/ week. Will continue titrating Sertraline and tapering Ativan as tolerated.    3/23: Hold ECT for hypotension, pt now with normal vitals.   3/24: Vitals stable. Patient agreeable to discontinue AM ativan dose to continue benzo taper.   3/25: 3rd ECT session  3/28: 4th ECT session, mild disorientation and nausea after procedure; increase sertraline to 150mg  3/30: 5th ECT session, endorses confusion and nausea after procedure  3/31: anxious about ECT side effects, will skip tx tomorrow  4/1: decrease bedtime ativan dose  4/2 Improving with ECT, next 4/4 Will add laxatives, cont to reduce Ativan  4/3 Improved, denies Si. Cont ECT will add Dulcolax dose today can use Dulcolax supp today too. Patient may have used sig  laxatives at home  4/4 6th ECT session, some confusion afterwards

## 2022-04-05 PROCEDURE — 99232 SBSQ HOSP IP/OBS MODERATE 35: CPT

## 2022-04-05 RX ADMIN — MAGNESIUM HYDROXIDE 30 MILLILITER(S): 400 TABLET, CHEWABLE ORAL at 09:51

## 2022-04-05 RX ADMIN — Medication 1 PATCH: at 08:01

## 2022-04-05 RX ADMIN — POLYETHYLENE GLYCOL 3350 17 GRAM(S): 17 POWDER, FOR SOLUTION ORAL at 12:14

## 2022-04-05 RX ADMIN — Medication 1 TABLET(S): at 09:51

## 2022-04-05 RX ADMIN — Medication 81 MILLIGRAM(S): at 09:51

## 2022-04-05 RX ADMIN — QUETIAPINE FUMARATE 200 MILLIGRAM(S): 200 TABLET, FILM COATED ORAL at 20:42

## 2022-04-05 RX ADMIN — Medication 25 MICROGRAM(S): at 06:36

## 2022-04-05 RX ADMIN — LAMOTRIGINE 150 MILLIGRAM(S): 25 TABLET, ORALLY DISINTEGRATING ORAL at 20:42

## 2022-04-05 RX ADMIN — SERTRALINE 150 MILLIGRAM(S): 25 TABLET, FILM COATED ORAL at 12:14

## 2022-04-05 RX ADMIN — SENNA PLUS 2 TABLET(S): 8.6 TABLET ORAL at 20:42

## 2022-04-05 RX ADMIN — Medication 1 PATCH: at 09:51

## 2022-04-05 RX ADMIN — Medication 0.5 MILLIGRAM(S): at 12:14

## 2022-04-05 RX ADMIN — Medication 1 PATCH: at 18:34

## 2022-04-05 RX ADMIN — Medication 1 PATCH: at 09:48

## 2022-04-05 RX ADMIN — Medication 0.25 MILLIGRAM(S): at 20:42

## 2022-04-05 NOTE — BH INPATIENT PSYCHIATRY PROGRESS NOTE - NSBHMETABOLIC_PSY_ALL_CORE_FT
BMI: BMI (kg/m2): 24.9 (03-10-22 @ 15:37)  HbA1c: A1C with Estimated Average Glucose Result: 4.8 % (03-05-22 @ 10:01)  Glucose: POCT Blood Glucose.: 113 mg/dL (03-02-22 @ 19:36)    BP: 118/78 (04-04-22 @ 09:20) (100/70 - 146/79)  Lipid Panel: Date/Time: 03-05-22 @ 10:39  Cholesterol, Serum: 148  Direct LDL: --  HDL Cholesterol, Serum: 45  Total Cholesterol/HDL Ration Measurement: --  Triglycerides, Serum: 98   BMI: BMI (kg/m2): 24.9 (03-10-22 @ 15:37)  HbA1c: A1C with Estimated Average Glucose Result: 4.8 % (03-05-22 @ 10:01)    Glucose: POCT Blood Glucose.: 113 mg/dL (03-02-22 @ 19:36)    BP: 118/78 (04-04-22 @ 09:20) (100/70 - 146/79)  Lipid Panel: Date/Time: 03-05-22 @ 10:39  Cholesterol, Serum: 148  Direct LDL: --  HDL Cholesterol, Serum: 45  Total Cholesterol/HDL Ration Measurement: --  Triglycerides, Serum: 98

## 2022-04-05 NOTE — BH INPATIENT PSYCHIATRY PROGRESS NOTE - CASE SUMMARY
Ms. Salazar is a 71 woman with prior psychiatric history of depression and prior suicide attempts (via OD), who carries a hx of Bipolar 2 disaorder, 2 psych admissions (1987, 1992),  no h/o substance abuse, transferred to [facility] from [facility] for management of depression after being stabilized for  AMS related to a suicide attempts via OD on 90 tabs Ativan.    Ms Salazar lives alone, she is , has no children and is retired . She still volunteers sometimes in school and identifies her brother as her main source of support.    On assessment today pt reports fluctuating mood states, reports feeling "happy and hopeful" yesterday and "depressed" again this morning. Reports feeling concerned about never getting better again and attempting suicide again whenever she returns home. Writer discussed again cbt concepts of distorted thinking and encouraged patient to monitor her thoughts for these distortions. Dental consult today at 2pm. Aiming to start ECT Friday 3/18. 
agree with assessment and plan of resident. ECT held to minimize cog disruption resume 4/4. ECT emailed to consider ways to reduce nausea and fearful upon awakening form ECT
Ms. Salazar is a 71 woman with prior psychiatric history of depression and prior suicide attempts (via OD), who carries a hx of Bipolar 2 disorder, 2 psych admissions (1987, 1992),  no h/o substance abuse, transferred to [facility] from [facility] for management of depression after being stabilized for  AMS related to a suicide attempts via OD on 90 tabs Ativan. Patient had her 6th ECT today. On encounter today pt reported feeling some confusion post-ECT. Endorses feeling better in terms of her mood. Endorsed constipation over the weekend despite bowel regimen. Will continue monitoring. Family meeting scheduled for Thursday. 
agree with plan to hold ECT. will email ECT about patients feeling distressed upon coming out of anesthesia
Ms. Salazar is a 71 woman with prior psychiatric history of depression and prior suicide attempts (via OD), who carries a hx of Bipolar 2 disorder, 2 psych admissions (1987, 1992),  no h/o substance abuse, transferred to [facility] from [facility] for management of depression after being stabilized for  AMS related to a suicide attempts via OD on 90 tabs Ativan. Patient had her 5th ECT today. Reports some gains in terms of her mood. Continues to express anxiety and mood fluctuations. ECT #6 scheduled for Friday.   
Ms. Salazar is a 71 woman with prior psychiatric history of depression and prior suicide attempts (via OD), who carries a hx of Bipolar 2 disaorder, 2 psych admissions (1987, 1992),  no h/o substance abuse, transferred to [facility] from [facility] for management of depression after being stabilized for  AMS related to a suicide attempts via OD on 90 tabs Ativan.    Ms Salazar lives alone, she is , has no children and is retired . She still volunteers sometimes in school and identifies her brother as her main source of support.    On assessment today pt reports fluctuating mood states, reports feeling "happy and hopeful" yesterday and "depressed" again this morning. Reports feeling concerned about never getting better again and attempting suicide again whenever she returns home. Writer discussed again cbt concepts of distorted thinking and encouraged patient to monitor her thoughts for these distortions. Dental consult today at 2pm. Aiming to start ECT Friday 3/18. 
Ms. Salazar is a 71 woman with prior psychiatric history of depression and prior suicide attempts (via OD), who carries a hx of Bipolar 2 disaorder, 2 psych admissions (1987, 1992),  no h/o substance abuse, transferred to [facility] from [facility] for management of depression after being stabilized for  AMS related to a suicide attempts via OD on 90 tabs Ativan.    Ms Salazar lives alone, she is , has no children and is retired . She still volunteers sometimes in school and identifies her brother as her main source of support.    On assessment today pt continues to express frustration over needing to wait for dental and not being able to start ECT yet. Throughout encounter patient appears to seek writer's validation by asking questions such as "do you think I ruined my life?", and making self deprecating statements, suggesting she is somehow guilty for her decline in terms of anxiety and mood this year. Writer encouraged pt to evaluate odds and facts supporting or refuting her statements and thoughts on herself. 
Ms. Salazar is a 71 woman with prior psychiatric history of depression and prior suicide attempts (via OD), who carries a hx of Bipolar 2 disorder, 2 psych admissions (1987, 1992),  no h/o substance abuse, transferred to [facility] from [facility] for management of depression after being stabilized for  AMS related to a suicide attempts via OD on 90 tabs Ativan. Patient had her 4th ECT today. During today's encounter topics of anxiety when picturing herself returning home. Normalized anticipatory anxiety. Discussed ativan taper. Pt's doses moved to 12:00 and 21:00.  
Ms. Salazar is a 71 woman with prior psychiatric history of depression and prior suicide attempts (via OD), who carries a hx of Bipolar 2 disorder, 2 psych admissions (1987, 1992),  no h/o substance abuse, transferred to [facility] from [facility] for management of depression after being stabilized for  AMS related to a suicide attempts via OD on 90 tabs Ativan. Patient had her 6th ECT yesterday. Pt presenting sustained improvement in terms of mood. Affect is more reactive and thought content is more future oriented. Pt considering [facility] as she doesn't feel her home environment and loneliness would be conducive to sustained improvement. 
agree with assessment and plan of resident. ECT held to minimize cog disruption resume 4/4. ECT emailed to consider ways to reduce nausea and fearful upon awakening form ECT
Ms. Salazar is a 71 woman with prior psychiatric history of depression and prior suicide attempts (via OD), who carries a hx of Bipolar 2 disaorder, 2 psych admissions (1987, 1992),  no h/o substance abuse, transferred to [facility] from [facility] for management of depression after being stabilized for  AMS related to a suicide attempts via OD on 90 tabs Ativan.    Ms Salazar lives alone, she is , has no children and is retired . She still volunteers sometimes in school and identifies her brother as her main source of support.    On assessment today pt expresses frustration over being assigned a new roommate and the time it will take to get started with ECT as dental consult could not be scheduled before Wednesday. Used her current situation to discuss distorted thinking styles, negative thinking and catastrophizing. Encouraged to view her current situation not as her just "needing to sit in the hospital waiting for treatment to start" but as part of her treatment itself and it allows her to learn coping skills to manage her frustration in a safe environment. Pt verbalized agreement. 
Ms. Salazar is a 71 woman with prior psychiatric history of depression and prior suicide attempts (via OD), who carries a hx of Bipolar 2 disorder, 2 psych admissions (1987, 1992),  no h/o substance abuse, transferred to [facility] from [facility] for management of depression after being stabilized for  AMS related to a suicide attempts via OD on 90 tabs Ativan. Patient had her 3d ECT session today. She was given fluids during ECT with improvement in BP. Pt noted to be working on her DBT homework and more mindful of boundaries.   
Ms. Salazar is a 71 woman with prior psychiatric history of depression and prior suicide attempts (via OD), who carries a hx of Bipolar 2 disorder, 2 psych admissions (1987, 1992),  no h/o substance abuse, transferred to [facility] from [facility] for management of depression after being stabilized for  AMS related to a suicide attempts via OD on 90 tabs Ativan. Continuing ativan taper. Pt's BP improved this morning. Will resume ECT tomorrow.   
Ms. Salazar is a 71 woman with prior psychiatric history of depression and prior suicide attempts (via OD), who carries a hx of Bipolar 2 disorder, 2 psych admissions (1987, 1992),  no h/o substance abuse, transferred to [facility] from [facility] for management of depression after being stabilized for  AMS related to a suicide attempts via OD on 90 tabs Ativan.  Pt had a medical rapid  response this morning due to hypotension and orthostasis:    03-23-22 @ 08:12  Lying BP: --/-- HR: --   Sitting BP: 87/64 HR: 87  Standing BP: 63/51 HR: 112  Site: upper left arm   Mode: electronic    Pushed fluids and vitals stabilized. Held ECT today due to hypotension.

## 2022-04-05 NOTE — BH INPATIENT PSYCHIATRY PROGRESS NOTE - NSBHASSESSSUMMFT_PSY_ALL_CORE
71-year-old woman with documented PPH of Bipolar 2 disorder (though questionable if hx is consistent with ahsan/hypomania), 2 prior suicide attempts (via OD), 2 psych admissions (1987, 1992), no h/o substance abuse, recently switched outpatient providers after her psychiatrist of 30 years (Dr. Kong) retired in late 2021, now transferred to [facility] from [facility] for management of depression after being stabilized for AMS after patient overdosed on 90 tabs of Ativan and several tabs of Lamictal and Seroquel iso 1 year of low mood, anxiety, feelings of worthlessness, hopelessness, and overwhelming loneliness. Patient has had ECT in the past, aim is to restart ECT during current hospitalization. Ms. Salazar appears to be unhappy with her new outpatient treatment team, idealizing Dr. Kong and describing her new psychiatrist and therapist as dismissive and uncaring. Ms. Salazar was tearful while sharing her story but expressed a sense of hope that with ECT she will once again feel like herself again.    Ms Salazar lives alone, she is , has no children, and is retired . She still volunteers sometimes in school and identifies her brother as her main source of support.    Pt now s/p 6 ECT sessions (3/18, 3/21, 3/25, 3/28, 3/30, 4/4). Reports resolution of cognitive side effects following yesterday's session. Remains adherent with medications and denies side effects. Continues to express anxiety about the future but endorses some improvement in hopelessness and negative self-image as patient engages more in psychotherapy. Now discussing future plans (returning home, selling house, looking at assisted living). Continues to seek reassurance frequently from all team members. Tolerating decrease in bedtime benzo dose (decreased 4/1). Planning for family meeting with brother on Thursday.     1. Legal: 9.27 admission  2. Safety: routine observation, pt denies SI/HI/I/P  3. Psychiatric:   -ECT Mon/Fri (2x per week to limit cognitive side effects)  -Continue Lamictal 150mg QHS, hold Lamictal the night before ECT (hold Sun and Thurs evenings)  -Continue Seroquel 200mg QHS  -Continue Ativan taper (last decreased 4/1), Ativan 0.5mg at 12PM, Ativan 0.25mg qhs  -sertraline 150mg, titrate as tolerated  -pantoprazole 6AM Mon/Fri prior to ECT to prevent nausea  4. Medical:   -Continue Synthroid 25mg daily, senna qhs, ASA 81mg daily  -Pt with elevation of TSH to 7.00 (up from 4.05) and bump in LFTs during medical hospitalization; plan to recheck with routine labs  -nicotine patch and PRN nicotine gum for cravings  -constipation, senna qhs, PRN MOM, PRN dulcolax  -monitor BP, pt with low-normal BPs, episode of symptomatic low BP during this admission    3/19 Depressed, tolerated ECT, cont ECT 3/21 Cont other meds  3/20 Depressed anxious, no SI. Cont ECT will communicate above to psychologist  3/21: Patient continues to be mostly withdrawn. Continues to present overly catastrophic and negative thinking. Case discussed with rest of treatment team. Will continue ECT 3x/ week. Will continue titrating Sertraline and tapering Ativan as tolerated.    3/23: Hold ECT for hypotension, pt now with normal vitals.   3/24: Vitals stable. Patient agreeable to discontinue AM ativan dose to continue benzo taper.   3/25: 3rd ECT session  3/28: 4th ECT session, mild disorientation and nausea after procedure; increase sertraline to 150mg  3/30: 5th ECT session, endorses confusion and nausea after procedure  3/31: anxious about ECT side effects, will skip tx tomorrow  4/1: decrease bedtime ativan dose  4/2 Improving with ECT, next 4/4 Will add laxatives, cont to reduce Ativan  4/3 Improved, denies SI. Cont ECT will add Dulcolax dose today can use Dulcolax supp today too. Patient may have used sig  laxatives at home  4/4 6th ECT session, some confusion afterwards  4/5 stable, still anxious about the future but more hopeful, making future plans

## 2022-04-05 NOTE — BH INPATIENT PSYCHIATRY PROGRESS NOTE - NSBHFUPINTERVALHXFT_PSY_A_CORE
Patient seen for follow-up for anxiety, depression and recent overdose. Patient is adherent with all medications on the unit. No acute events or PRN medications required overnight. Patient reports resolution of mental fogginess/memory difficulties she experienced after ECT yesterday. Denies physical complaints including lightheadedness and dizziness. Eating and sleeping well. Endorses anxiety around thinking about returning to her home where she made her suicide attempt and lives alone, reports she has been thinking about selling her place and looking into assisted living. Writer provided validation of patient's worries and encouraged her to discuss her feelings in therapy. Patient denies SI/HI and hopelessness.

## 2022-04-05 NOTE — BH INPATIENT PSYCHIATRY PROGRESS NOTE - NSBHCHARTREVIEWVS_PSY_A_CORE FT
Vital Signs Last 24 Hrs  T(C): 36.4 (04-05-22 @ 07:41), Max: 36.4 (04-05-22 @ 07:41)  T(F): 97.6 (04-05-22 @ 07:41), Max: 97.6 (04-05-22 @ 07:41)  HR: --  BP: --  BP(mean): --  RR: --  SpO2: --    Orthostatic VS  04-05-22 @ 08:04  Lying BP: --/-- HR: --  Sitting BP: 90/60 HR: 90  Standing BP: --/-- HR: --  Site: --  Mode: --  Orthostatic VS  04-05-22 @ 07:41  Lying BP: --/-- HR: --  Sitting BP: 89/66 HR: 79  Standing BP: --/-- HR: --  Site: --  Mode: --   Vital Signs Last 24 Hrs  T(C): 36.4 (04-05-22 @ 07:41), Max: 36.4 (04-05-22 @ 07:41)  T(F): 97.6 (04-05-22 @ 07:41), Max: 97.6 (04-05-22 @ 07:41)  HR: --  BP: --  BP(mean): --  RR: --  SpO2: --    Orthostatic VS  04-05-22 @ 08:04  Lying BP: --/-- HR: --  Sitting BP: 90/60 HR: 90  Standing BP: --/-- HR: --  Site: --  Mode: --  Orthostatic VS  04-05-22 @ 07:41  Lying BP: --/-- HR: --  Sitting BP: 89/66 HR: 79  Standing BP: --/-- HR: --  Site: --  Mode: --  Orthostatic VS  04-04-22 @ 09:52  Lying BP: --/-- HR: --  Sitting BP: 108/75 HR: 87  Standing BP: --/-- HR: --  Site: --  Mode: --  Orthostatic VS  04-04-22 @ 06:49  Lying BP: --/-- HR: --  Sitting BP: 97/63 HR: --  Standing BP: --/-- HR: --  Site: --  Mode: --

## 2022-04-05 NOTE — BH INPATIENT PSYCHIATRY PROGRESS NOTE - MSE UNSTRUCTURED FT
Patient is awake and alert. In hospital attire, appropriate grooming and hygiene. Cooperative, reassurance-seeking. No PMR/PMA or other abnormal movements. Speech fluent. Mood is "better but a little worried." Affect is mildly anxious, full range, appropriate. TP coherent, logical, overinclusive. No delusions. No perceptual disturbance. Denies any thoughts of hurting herself. Attention appropriate, cognition grossly intact. Insight and judgment limited. Impulse control intact.

## 2022-04-06 PROCEDURE — 90837 PSYTX W PT 60 MINUTES: CPT

## 2022-04-06 PROCEDURE — 99232 SBSQ HOSP IP/OBS MODERATE 35: CPT

## 2022-04-06 RX ORDER — SODIUM CHLORIDE 0.65 %
1 AEROSOL, SPRAY (ML) NASAL ONCE
Refills: 0 | Status: DISCONTINUED | OUTPATIENT
Start: 2022-04-06 | End: 2022-04-21

## 2022-04-06 RX ADMIN — Medication 81 MILLIGRAM(S): at 07:53

## 2022-04-06 RX ADMIN — QUETIAPINE FUMARATE 200 MILLIGRAM(S): 200 TABLET, FILM COATED ORAL at 20:26

## 2022-04-06 RX ADMIN — SERTRALINE 150 MILLIGRAM(S): 25 TABLET, FILM COATED ORAL at 12:30

## 2022-04-06 RX ADMIN — Medication 1 PATCH: at 07:54

## 2022-04-06 RX ADMIN — Medication 0.5 MILLIGRAM(S): at 12:30

## 2022-04-06 RX ADMIN — Medication 1 TABLET(S): at 07:53

## 2022-04-06 RX ADMIN — SENNA PLUS 2 TABLET(S): 8.6 TABLET ORAL at 20:26

## 2022-04-06 RX ADMIN — Medication 1 PATCH: at 18:47

## 2022-04-06 RX ADMIN — Medication 0.25 MILLIGRAM(S): at 20:26

## 2022-04-06 RX ADMIN — Medication 1 PATCH: at 06:52

## 2022-04-06 RX ADMIN — Medication 25 MICROGRAM(S): at 06:44

## 2022-04-06 RX ADMIN — LAMOTRIGINE 150 MILLIGRAM(S): 25 TABLET, ORALLY DISINTEGRATING ORAL at 20:47

## 2022-04-06 NOTE — BH INPATIENT PSYCHIATRY PROGRESS NOTE - NSBHFUPINTERVALHXFT_PSY_A_CORE
Patient seen for follow-up for anxiety, depression and recent overdose. Patient is adherent with all medications on the unit. No acute events or PRN medications required overnight. Pt tolerating twice weekly ECT. Ms. Salazar reports sustained improvement in terms of mood, however continues to express concerns about returning home. Continues to present catastrophic thinking. Responding better to redirection and is better able to label her distorted thoughts. Family meeting scheduled for tomorrow.

## 2022-04-06 NOTE — BH INPATIENT PSYCHIATRY PROGRESS NOTE - CURRENT MEDICATION
MEDICATIONS  (STANDING):  aspirin enteric coated 81 milliGRAM(s) Oral daily  influenza  Vaccine (HIGH DOSE) 0.7 milliLiter(s) IntraMuscular once  lamoTRIgine 150 milliGRAM(s) Oral <User Schedule>  levothyroxine 25 MICROGram(s) Oral daily  LORazepam     Tablet 0.5 milliGRAM(s) Oral <User Schedule>  LORazepam     Tablet 0.25 milliGRAM(s) Oral at bedtime  multivitamin 1 Tablet(s) Oral daily  nicotine - 21 mG/24Hr(s) Patch 1 patch Transdermal daily  pantoprazole    Tablet 40 milliGRAM(s) Oral <User Schedule>  polyethylene glycol 3350 17 Gram(s) Oral <User Schedule>  QUEtiapine 200 milliGRAM(s) Oral at bedtime  senna 2 Tablet(s) Oral at bedtime  sertraline 150 milliGRAM(s) Oral daily    MEDICATIONS  (PRN):  artificial tears (preservative free) Ophthalmic Solution 1 Drop(s) Left EYE four times a day PRN dry eyes  bisacodyl Suppository 10 milliGRAM(s) Rectal daily PRN constipation  docosanol 10% Cream 1 Application(s) Topical every 6 hours PRN cold sore  magnesium hydroxide Suspension 30 milliLiter(s) Oral <User Schedule> PRN Constipation  nicotine  Polacrilex Gum 2 milliGRAM(s) Oral every 2 hours PRN nicotine craving

## 2022-04-06 NOTE — BH INPATIENT PSYCHIATRY PROGRESS NOTE - NSBHMETABOLIC_PSY_ALL_CORE_FT
BMI: BMI (kg/m2): 24.9 (03-10-22 @ 15:37)  HbA1c: A1C with Estimated Average Glucose Result: 4.8 % (03-05-22 @ 10:01)    Glucose: POCT Blood Glucose.: 113 mg/dL (03-02-22 @ 19:36)    BP: 95/57 (04-06-22 @ 08:04) (95/57 - 146/79)  Lipid Panel: Date/Time: 03-05-22 @ 10:39  Cholesterol, Serum: 148  Direct LDL: --  HDL Cholesterol, Serum: 45  Total Cholesterol/HDL Ration Measurement: --  Triglycerides, Serum: 98

## 2022-04-06 NOTE — BH INPATIENT PSYCHIATRY PROGRESS NOTE - NSBHASSESSSUMMFT_PSY_ALL_CORE
71-year-old woman with documented PPH of Bipolar 2 disorder (though questionable if hx is consistent with ahsan/hypomania), 2 prior suicide attempts (via OD), 2 psych admissions (1987, 1992), no h/o substance abuse, recently switched outpatient providers after her psychiatrist of 30 years (Dr. Kong) retired in late 2021, now transferred to [facility] from [facility] for management of depression after being stabilized for AMS after patient overdosed on 90 tabs of Ativan and several tabs of Lamictal and Seroquel iso 1 year of low mood, anxiety, feelings of worthlessness, hopelessness, and overwhelming loneliness. Patient has had ECT in the past, aim is to restart ECT during current hospitalization. Ms. Salazar appears to be unhappy with her new outpatient treatment team, idealizing Dr. Kong and describing her new psychiatrist and therapist as dismissive and uncaring. Ms. Salazar was tearful while sharing her story but expressed a sense of hope that with ECT she will once again feel like herself again.    Ms Salazar lives alone, she is , has no children, and is retired . She still volunteers sometimes in school and identifies her brother as her main source of support.    Pt now s/p 6 ECT sessions (3/18, 3/21, 3/25, 3/28, 3/30, 4/4). Next ECT schdeduled for this Friday 4/8. Remains adherent with medications and denies side effects. Continues to express anxiety about the future but endorses some improvement in hopelessness and negative self-image as patient engages more in psychotherapy. Now discussing future plans (returning home, selling house, looking at assisted living). Continues to seek reassurance frequently from all team members. Tolerating decrease in bedtime benzo dose (decreased 4/1). Planning for family meeting with brother on Thursday.     1. Legal: 9.27 admission  2. Safety: routine observation, pt denies SI/HI/I/P  3. Psychiatric:   -ECT Mon/Fri (2x per week to limit cognitive side effects)  -Continue Lamictal 150mg QHS, hold Lamictal the night before ECT (hold Sun and Thurs evenings)  -Continue Seroquel 200mg QHS  -Continue Ativan taper (last decreased 4/1), Ativan 0.5mg at 12PM, Ativan 0.25mg qhs  -sertraline 150mg, titrate as tolerated  -pantoprazole 6AM Mon/Fri prior to ECT to prevent nausea  4. Medical:   -Continue Synthroid 25mg daily, senna qhs, ASA 81mg daily  -Pt with elevation of TSH to 7.00 (up from 4.05) and bump in LFTs during medical hospitalization; plan to recheck with routine labs  -nicotine patch and PRN nicotine gum for cravings  -constipation, senna qhs, PRN MOM, PRN dulcolax  -monitor BP, pt with low-normal BPs, episode of symptomatic low BP during this admission    3/19 Depressed, tolerated ECT, cont ECT 3/21 Cont other meds  3/20 Depressed anxious, no SI. Cont ECT will communicate above to psychologist  3/21: Patient continues to be mostly withdrawn. Continues to present overly catastrophic and negative thinking. Case discussed with rest of treatment team. Will continue ECT 3x/ week. Will continue titrating Sertraline and tapering Ativan as tolerated.    3/23: Hold ECT for hypotension, pt now with normal vitals.   3/24: Vitals stable. Patient agreeable to discontinue AM ativan dose to continue benzo taper.   3/25: 3rd ECT session  3/28: 4th ECT session, mild disorientation and nausea after procedure; increase sertraline to 150mg  3/30: 5th ECT session, endorses confusion and nausea after procedure  3/31: anxious about ECT side effects, will skip tx tomorrow  4/1: decrease bedtime ativan dose  4/2 Improving with ECT, next 4/4 Will add laxatives, cont to reduce Ativan  4/3 Improved, denies SI. Cont ECT will add Dulcolax dose today can use Dulcolax supp today too. Patient may have used sig  laxatives at home  4/4 6th ECT session, some confusion afterwards  4/5 stable, still anxious about the future but more hopeful, making future plans

## 2022-04-06 NOTE — BH INPATIENT PSYCHIATRY PROGRESS NOTE - NSBHCHARTREVIEWVS_PSY_A_CORE FT
Vital Signs Last 24 Hrs  T(C): 36.6 (04-06-22 @ 08:04), Max: 36.6 (04-06-22 @ 08:04)  T(F): 97.9 (04-06-22 @ 08:04), Max: 97.9 (04-06-22 @ 08:04)  HR: 67 (04-06-22 @ 08:04) (67 - 67)  BP: 95/57 (04-06-22 @ 08:04) (95/57 - 95/57)  BP(mean): --  RR: --  SpO2: --    Orthostatic VS  04-05-22 @ 08:04  Lying BP: --/-- HR: --  Sitting BP: 90/60 HR: 90  Standing BP: --/-- HR: --  Site: --  Mode: --  Orthostatic VS  04-05-22 @ 07:41  Lying BP: --/-- HR: --  Sitting BP: 89/66 HR: 79  Standing BP: --/-- HR: --  Site: --  Mode: --

## 2022-04-07 PROCEDURE — 99232 SBSQ HOSP IP/OBS MODERATE 35: CPT

## 2022-04-07 PROCEDURE — 90847 FAMILY PSYTX W/PT 50 MIN: CPT

## 2022-04-07 RX ADMIN — SENNA PLUS 2 TABLET(S): 8.6 TABLET ORAL at 20:40

## 2022-04-07 RX ADMIN — Medication 1 PATCH: at 08:23

## 2022-04-07 RX ADMIN — SERTRALINE 150 MILLIGRAM(S): 25 TABLET, FILM COATED ORAL at 12:04

## 2022-04-07 RX ADMIN — QUETIAPINE FUMARATE 200 MILLIGRAM(S): 200 TABLET, FILM COATED ORAL at 20:40

## 2022-04-07 RX ADMIN — Medication 1 PATCH: at 18:13

## 2022-04-07 RX ADMIN — Medication 25 MICROGRAM(S): at 06:26

## 2022-04-07 RX ADMIN — Medication 1 TABLET(S): at 08:23

## 2022-04-07 RX ADMIN — POLYETHYLENE GLYCOL 3350 17 GRAM(S): 17 POWDER, FOR SOLUTION ORAL at 12:07

## 2022-04-07 RX ADMIN — Medication 81 MILLIGRAM(S): at 08:23

## 2022-04-07 RX ADMIN — Medication 0.5 MILLIGRAM(S): at 12:04

## 2022-04-07 NOTE — BH INPATIENT PSYCHIATRY PROGRESS NOTE - NSBHMETABOLIC_PSY_ALL_CORE_FT
BMI: BMI (kg/m2): 24.9 (03-10-22 @ 15:37)  HbA1c: A1C with Estimated Average Glucose Result: 4.8 % (03-05-22 @ 10:01)    Glucose: POCT Blood Glucose.: 113 mg/dL (03-02-22 @ 19:36)    BP: 95/57 (04-06-22 @ 08:04) (95/57 - 95/57)  Lipid Panel: Date/Time: 03-05-22 @ 10:39  Cholesterol, Serum: 148  Direct LDL: --  HDL Cholesterol, Serum: 45  Total Cholesterol/HDL Ration Measurement: --  Triglycerides, Serum: 98

## 2022-04-07 NOTE — BH INPATIENT PSYCHIATRY PROGRESS NOTE - NSBHASSESSSUMMFT_PSY_ALL_CORE
71-year-old woman with documented PPH of Bipolar 2 disorder (though questionable if hx is consistent with ahsan/hypomania), 2 prior suicide attempts (via OD), 2 psych admissions (1987, 1992), no h/o substance abuse, recently switched outpatient providers after her psychiatrist of 30 years (Dr. Kong) retired in late 2021, now transferred to [facility] from [facility] for management of depression after being stabilized for AMS after patient overdosed on 90 tabs of Ativan and several tabs of Lamictal and Seroquel iso 1 year of low mood, anxiety, feelings of worthlessness, hopelessness, and overwhelming loneliness. Patient has had ECT in the past, aim is to restart ECT during current hospitalization. Ms. Salazar appears to be unhappy with her new outpatient treatment team, idealizing Dr. Kong and describing her new psychiatrist and therapist as dismissive and uncaring. Ms. Salazar was tearful while sharing her story but expressed a sense of hope that with ECT she will once again feel like herself again.    Ms Salazar lives alone, she is , has no children, and is retired . She still volunteers sometimes in school and identifies her brother as her main source of support.    Pt now s/p 6 ECT sessions (3/18, 3/21, 3/25, 3/28, 3/30, 4/4). Next ECT schdeduled for this Friday 4/8. Remains adherent with medications and denies side effects. Continues to express anxiety about the future but endorses some improvement in hopelessness and negative self-image as patient engages more in psychotherapy. Now discussing future plans (returning home, selling house, looking at assisted living). Continues to seek reassurance frequently from all team members. Tolerating decrease in bedtime benzo dose (decreased 4/1). Planning for family meeting with brother on Thursday.     1. Legal: 9.27 admission  2. Safety: routine observation, pt denies SI/HI/I/P  3. Psychiatric:   -ECT Mon/Fri (2x per week to limit cognitive side effects)  -Continue Lamictal 150mg QHS, hold Lamictal the night before ECT (hold Sun and Thurs evenings)  -Continue Seroquel 200mg QHS  -Continue Ativan taper (last decreased 4/1), Ativan 0.5mg at 12PM, Ativan 0.25mg qhs  -sertraline 150mg, titrate as tolerated  -pantoprazole 6AM Mon/Fri prior to ECT to prevent nausea  4. Medical:   -Continue Synthroid 25mg daily, senna qhs, ASA 81mg daily  -Pt with elevation of TSH to 7.00 (up from 4.05) and bump in LFTs during medical hospitalization; plan to recheck with routine labs  -nicotine patch and PRN nicotine gum for cravings  -constipation, senna qhs, PRN MOM, PRN dulcolax  -monitor BP, pt with low-normal BPs, episode of symptomatic low BP during this admission    3/19 Depressed, tolerated ECT, cont ECT 3/21 Cont other meds  3/20 Depressed anxious, no SI. Cont ECT will communicate above to psychologist  3/21: Patient continues to be mostly withdrawn. Continues to present overly catastrophic and negative thinking. Case discussed with rest of treatment team. Will continue ECT 3x/ week. Will continue titrating Sertraline and tapering Ativan as tolerated.    3/23: Hold ECT for hypotension, pt now with normal vitals.   3/24: Vitals stable. Patient agreeable to discontinue AM ativan dose to continue benzo taper.   3/25: 3rd ECT session  3/28: 4th ECT session, mild disorientation and nausea after procedure; increase sertraline to 150mg  3/30: 5th ECT session, endorses confusion and nausea after procedure  3/31: anxious about ECT side effects, will skip tx tomorrow  4/1: decrease bedtime ativan dose  4/2 Improving with ECT, next 4/4 Will add laxatives, cont to reduce Ativan  4/3 Improved, denies SI. Cont ECT will add Dulcolax dose today can use Dulcolax supp today too. Patient may have used sig  laxatives at home  4/4 6th ECT session, some confusion afterwards  4/5 stable, still anxious about the future but more hopeful, making future plans  4/6: Pt endorses sustained gains in terms of mood and anxiety. Agreed to discontinue nighttime ativan. ECT scheduled for tomorrow morning.

## 2022-04-07 NOTE — BH INPATIENT PSYCHIATRY PROGRESS NOTE - NSBHFUPINTERVALHXFT_PSY_A_CORE
Patient seen for follow-up for anxiety, depression and recent overdose. Patient is adherent with all medications on the unit. No acute events or PRN medications required overnight. Pt tolerating twice weekly ECT. On encounter this morning pt was found lying in bed, reading her notebook, under no visible distress. When writer greeted her she said she was glad to see writer since she was having a panic attack. Pt expressed concern about how to manage her "fear of abandonment" in the future. Discussed how this fear falls in line with her other distorted ways of thinking and reacting to life events, often catastrophizing and viewing things as black or white. Encouraged her to continue working and practicing her coping skills and reframing her thoughts. Pt was able to reframe people "abandoning" her as a natural part of life. She was able to normalize how people often come in and out of our lives. Pt endorses sustained gains in terms of mood and anxiety. Agreed to discontinue nighttime ativan as part of ongoing taper. Family meeting scheduled for today at 2pm.

## 2022-04-07 NOTE — BH INPATIENT PSYCHIATRY PROGRESS NOTE - NSBHCHARTREVIEWVS_PSY_A_CORE FT
Vital Signs Last 24 Hrs  T(C): 36.6 (04-07-22 @ 07:27), Max: 36.6 (04-07-22 @ 07:27)  T(F): 97.9 (04-07-22 @ 07:27), Max: 97.9 (04-07-22 @ 07:27)  HR: --  BP: --  BP(mean): --  RR: --  SpO2: --    Orthostatic VS  04-07-22 @ 07:27  Lying BP: --/-- HR: --  Sitting BP: 96/69 HR: 74  Standing BP: --/-- HR: --  Site: upper left arm  Mode: electronic

## 2022-04-07 NOTE — BH INPATIENT PSYCHIATRY PROGRESS NOTE - PRN MEDS
MEDICATIONS  (PRN):  artificial tears (preservative free) Ophthalmic Solution 1 Drop(s) Left EYE four times a day PRN dry eyes  bisacodyl Suppository 10 milliGRAM(s) Rectal daily PRN constipation  docosanol 10% Cream 1 Application(s) Topical every 6 hours PRN cold sore  magnesium hydroxide Suspension 30 milliLiter(s) Oral <User Schedule> PRN Constipation  nicotine  Polacrilex Gum 2 milliGRAM(s) Oral every 2 hours PRN nicotine craving  sodium chloride 0.65% Nasal 1 Spray(s) Both Nostrils once PRN congestion

## 2022-04-07 NOTE — BH INPATIENT PSYCHIATRY PROGRESS NOTE - CURRENT MEDICATION
MEDICATIONS  (STANDING):  aspirin enteric coated 81 milliGRAM(s) Oral daily  influenza  Vaccine (HIGH DOSE) 0.7 milliLiter(s) IntraMuscular once  lamoTRIgine 150 milliGRAM(s) Oral <User Schedule>  levothyroxine 25 MICROGram(s) Oral daily  LORazepam     Tablet 0.25 milliGRAM(s) Oral at bedtime  LORazepam     Tablet 0.5 milliGRAM(s) Oral <User Schedule>  multivitamin 1 Tablet(s) Oral daily  nicotine - 21 mG/24Hr(s) Patch 1 patch Transdermal daily  pantoprazole    Tablet 40 milliGRAM(s) Oral <User Schedule>  polyethylene glycol 3350 17 Gram(s) Oral <User Schedule>  QUEtiapine 200 milliGRAM(s) Oral at bedtime  senna 2 Tablet(s) Oral at bedtime  sertraline 150 milliGRAM(s) Oral daily    MEDICATIONS  (PRN):  artificial tears (preservative free) Ophthalmic Solution 1 Drop(s) Left EYE four times a day PRN dry eyes  bisacodyl Suppository 10 milliGRAM(s) Rectal daily PRN constipation  docosanol 10% Cream 1 Application(s) Topical every 6 hours PRN cold sore  magnesium hydroxide Suspension 30 milliLiter(s) Oral <User Schedule> PRN Constipation  nicotine  Polacrilex Gum 2 milliGRAM(s) Oral every 2 hours PRN nicotine craving  sodium chloride 0.65% Nasal 1 Spray(s) Both Nostrils once PRN congestion

## 2022-04-08 PROCEDURE — 99232 SBSQ HOSP IP/OBS MODERATE 35: CPT | Mod: 25

## 2022-04-08 RX ORDER — ACETAMINOPHEN 500 MG
650 TABLET ORAL ONCE
Refills: 0 | Status: COMPLETED | OUTPATIENT
Start: 2022-04-08 | End: 2022-04-08

## 2022-04-08 RX ORDER — FLUMAZENIL 0.1 MG/ML
0.2 VIAL (ML) INTRAVENOUS ONCE
Refills: 0 | Status: COMPLETED | OUTPATIENT
Start: 2022-04-08 | End: 2022-04-08

## 2022-04-08 RX ORDER — SERTRALINE 25 MG/1
200 TABLET, FILM COATED ORAL DAILY
Refills: 0 | Status: DISCONTINUED | OUTPATIENT
Start: 2022-04-08 | End: 2022-04-21

## 2022-04-08 RX ADMIN — Medication 81 MILLIGRAM(S): at 13:40

## 2022-04-08 RX ADMIN — QUETIAPINE FUMARATE 200 MILLIGRAM(S): 200 TABLET, FILM COATED ORAL at 20:13

## 2022-04-08 RX ADMIN — Medication 0.2 MILLIGRAM(S): at 10:45

## 2022-04-08 RX ADMIN — LAMOTRIGINE 150 MILLIGRAM(S): 25 TABLET, ORALLY DISINTEGRATING ORAL at 20:12

## 2022-04-08 RX ADMIN — Medication 1 PATCH: at 22:55

## 2022-04-08 RX ADMIN — SERTRALINE 150 MILLIGRAM(S): 25 TABLET, FILM COATED ORAL at 13:39

## 2022-04-08 RX ADMIN — Medication 650 MILLIGRAM(S): at 20:13

## 2022-04-08 RX ADMIN — Medication 25 MICROGRAM(S): at 06:24

## 2022-04-08 RX ADMIN — Medication 1 TABLET(S): at 13:40

## 2022-04-08 RX ADMIN — PANTOPRAZOLE SODIUM 40 MILLIGRAM(S): 20 TABLET, DELAYED RELEASE ORAL at 06:24

## 2022-04-08 RX ADMIN — SENNA PLUS 2 TABLET(S): 8.6 TABLET ORAL at 20:13

## 2022-04-08 RX ADMIN — Medication 1 PATCH: at 13:39

## 2022-04-08 RX ADMIN — Medication 650 MILLIGRAM(S): at 22:55

## 2022-04-08 RX ADMIN — Medication 0.5 MILLIGRAM(S): at 13:40

## 2022-04-08 NOTE — BH INPATIENT PSYCHIATRY PROGRESS NOTE - CURRENT MEDICATION
MEDICATIONS  (STANDING):  aspirin enteric coated 81 milliGRAM(s) Oral daily  influenza  Vaccine (HIGH DOSE) 0.7 milliLiter(s) IntraMuscular once  lamoTRIgine 150 milliGRAM(s) Oral <User Schedule>  levothyroxine 25 MICROGram(s) Oral daily  LORazepam     Tablet 0.5 milliGRAM(s) Oral <User Schedule>  multivitamin 1 Tablet(s) Oral daily  nicotine - 21 mG/24Hr(s) Patch 1 patch Transdermal daily  pantoprazole    Tablet 40 milliGRAM(s) Oral <User Schedule>  polyethylene glycol 3350 17 Gram(s) Oral <User Schedule>  QUEtiapine 200 milliGRAM(s) Oral at bedtime  senna 2 Tablet(s) Oral at bedtime  sertraline 200 milliGRAM(s) Oral daily    MEDICATIONS  (PRN):  artificial tears (preservative free) Ophthalmic Solution 1 Drop(s) Left EYE four times a day PRN dry eyes  bisacodyl Suppository 10 milliGRAM(s) Rectal daily PRN constipation  docosanol 10% Cream 1 Application(s) Topical every 6 hours PRN cold sore  magnesium hydroxide Suspension 30 milliLiter(s) Oral <User Schedule> PRN Constipation  nicotine  Polacrilex Gum 2 milliGRAM(s) Oral every 2 hours PRN nicotine craving  sodium chloride 0.65% Nasal 1 Spray(s) Both Nostrils once PRN congestion

## 2022-04-08 NOTE — ECT TREATMENT NOTE - NSECTFOCPECOMPLET_PSY_ALL_CORE
Focused Physical Exam Completed

## 2022-04-08 NOTE — ECT TREATMENT NOTE - NSECTPOSTTXSUMMARY_PSY_ALL_CORE
The patient had a well modified grand mal seizure under general anesthesia and muscle relaxant. The patient is alert, responsive, in no acute distress.  Recovery uneventful.

## 2022-04-08 NOTE — ECT PRE-PROCEDURE CHECKLIST - NSBENZOLAST24HRS_PSY_ALL_CORE
yes (notify psychiatrist)

## 2022-04-08 NOTE — ECT TREATMENT NOTE - NSECTRENUROCOMMENT_PSY_ALL_CORE
increased urinary frequency

## 2022-04-08 NOTE — ECT TREATMENT NOTE - NSICDXBHPRIMARYDX_PSY_ALL_CORE
Bipolar 2 disorder, major depressive episode   F31.78  
Bipolar 2 disorder, major depressive episode   F31.49  
Bipolar 2 disorder, major depressive episode   F31.41  
Bipolar 2 disorder, major depressive episode   F31.35  
Bipolar 2 disorder, major depressive episode   F31.28  
Bipolar 2 disorder, major depressive episode   F31.66  
Bipolar 2 disorder, major depressive episode   F31.78

## 2022-04-08 NOTE — ECT TREATMENT NOTE - NSECTTXPERFDATETIME_PSY_ALL_CORE
08-Apr-2022 10:28
28-Mar-2022 09:15
21-Mar-2022 10:58
25-Mar-2022 11:01
30-Mar-2022 11:37
04-Apr-2022 08:39
18-Mar-2022 11:52

## 2022-04-08 NOTE — ECT PRE-PROCEDURE CHECKLIST - AS BP NONINV SITE
left upper arm
left upper arm
right upper arm
right upper arm
left upper arm
right upper arm
left upper arm

## 2022-04-08 NOTE — BH INPATIENT PSYCHIATRY PROGRESS NOTE - NSBHFUPINTERVALHXFT_PSY_A_CORE
Patient seen for follow-up for anxiety, depression and recent overdose. Patient is adherent with all medications on the unit. No acute events or PRN medications required overnight. Pt tolerating twice weekly ECT. Went for ECT #7 today. Family meeting held yesterday afternoon. Discharge projected for next week on Friday as patient and her family would like for her to be present for Easter dinner. Pt aiming to start PHP the Monday after (4/18). Pt continues to endorse anticipatory anxiety related to discharge and catastrophic thinking when facing obstacles, however able to utilize her coping skills and CBT techniques to reframe her thoughts.

## 2022-04-08 NOTE — ECT TREATMENT NOTE - NSECTOTHERCOMMENT_PSY_ALL_CORE
myalgia, myositis, s/p tonsillectomy

## 2022-04-08 NOTE — BH INPATIENT PSYCHIATRY PROGRESS NOTE - NSBHCHARTREVIEWVS_PSY_A_CORE FT
Vital Signs Last 24 Hrs  T(C): 36.8 (04-08-22 @ 11:35), Max: 36.8 (04-08-22 @ 11:35)  T(F): 98.3 (04-08-22 @ 11:35), Max: 98.3 (04-08-22 @ 11:35)  HR: 82 (04-08-22 @ 11:35) (75 - 91)  BP: 136/75 (04-08-22 @ 11:35) (108/71 - 138/85)  BP(mean): --  RR: 16 (04-08-22 @ 11:35) (16 - 19)  SpO2: 100% (04-08-22 @ 11:35) (96% - 100%)    Orthostatic VS  04-08-22 @ 12:05  Lying BP: --/-- HR: --  Sitting BP: 115/71 HR: 74  Standing BP: --/-- HR: --  Site: upper left arm  Mode: electronic  Orthostatic VS  04-08-22 @ 06:24  Lying BP: --/-- HR: --  Sitting BP: 126/75 HR: 64  Standing BP: 109/64 HR: 90  Site: upper left arm  Mode: electronic  Orthostatic VS  04-07-22 @ 07:27  Lying BP: --/-- HR: --  Sitting BP: 96/69 HR: 74  Standing BP: --/-- HR: --  Site: upper left arm  Mode: electronic

## 2022-04-08 NOTE — ECT TREATMENT NOTE - NSICDXBHSECONDARYDX_PSY_ALL_CORE
Hypothyroidism   E03.9  

## 2022-04-08 NOTE — BH INPATIENT PSYCHIATRY PROGRESS NOTE - NSBHMETABOLIC_PSY_ALL_CORE_FT
BMI: BMI (kg/m2): 24.9 (03-10-22 @ 15:37)  HbA1c: A1C with Estimated Average Glucose Result: 4.8 % (03-05-22 @ 10:01)    Glucose: POCT Blood Glucose.: 113 mg/dL (03-02-22 @ 19:36)    BP: 136/75 (04-08-22 @ 11:35) (95/57 - 138/85)  Lipid Panel: Date/Time: 03-05-22 @ 10:39  Cholesterol, Serum: 148  Direct LDL: --  HDL Cholesterol, Serum: 45  Total Cholesterol/HDL Ration Measurement: --  Triglycerides, Serum: 98

## 2022-04-08 NOTE — BH INPATIENT PSYCHIATRY PROGRESS NOTE - NSBHASSESSSUMMFT_PSY_ALL_CORE
71-year-old woman with documented PPH of Bipolar 2 disorder (though questionable if hx is consistent with ahsan/hypomania), 2 prior suicide attempts (via OD), 2 psych admissions (1987, 1992), no h/o substance abuse, recently switched outpatient providers after her psychiatrist of 30 years (Dr. Kong) retired in late 2021, now transferred to [facility] from [facility] for management of depression after being stabilized for AMS after patient overdosed on 90 tabs of Ativan and several tabs of Lamictal and Seroquel iso 1 year of low mood, anxiety, feelings of worthlessness, hopelessness, and overwhelming loneliness. Patient has had ECT in the past, aim is to restart ECT during current hospitalization. Ms. Salazar appears to be unhappy with her new outpatient treatment team, idealizing Dr. Kong and describing her new psychiatrist and therapist as dismissive and uncaring. Ms. Salazar was tearful while sharing her story but expressed a sense of hope that with ECT she will once again feel like herself again.    Ms Salazar lives alone, she is , has no children, and is retired . She still volunteers sometimes in school and identifies her brother as her main source of support.    Pt now s/p 6 ECT sessions (3/18, 3/21, 3/25, 3/28, 3/30, 4/4). Next ECT schdeduled for this Friday 4/8. Remains adherent with medications and denies side effects. Continues to express anxiety about the future but endorses some improvement in hopelessness and negative self-image as patient engages more in psychotherapy. Now discussing future plans (returning home, selling house, looking at assisted living). Continues to seek reassurance frequently from all team members. Tolerating decrease in bedtime benzo dose (decreased 4/1). Planning for family meeting with brother on Thursday.     1. Legal: 9.27 admission  2. Safety: routine observation, pt denies SI/HI/I/P  3. Psychiatric:   -ECT Mon/Fri (2x per week to limit cognitive side effects)  -Continue Lamictal 150mg QHS, hold Lamictal the night before ECT (hold Sun and Thurs evenings)  -Continue Seroquel 200mg QHS  -Continue Ativan taper (last decreased 4/1), Ativan 0.5mg at 12PM, Ativan 0.25mg qhs  -sertraline 150mg, titrate as tolerated  -pantoprazole 6AM Mon/Fri prior to ECT to prevent nausea  4. Medical:   -Continue Synthroid 25mg daily, senna qhs, ASA 81mg daily  -Pt with elevation of TSH to 7.00 (up from 4.05) and bump in LFTs during medical hospitalization; plan to recheck with routine labs  -nicotine patch and PRN nicotine gum for cravings  -constipation, senna qhs, PRN MOM, PRN dulcolax  -monitor BP, pt with low-normal BPs, episode of symptomatic low BP during this admission    3/19 Depressed, tolerated ECT, cont ECT 3/21 Cont other meds  3/20 Depressed anxious, no SI. Cont ECT will communicate above to psychologist  3/21: Patient continues to be mostly withdrawn. Continues to present overly catastrophic and negative thinking. Case discussed with rest of treatment team. Will continue ECT 3x/ week. Will continue titrating Sertraline and tapering Ativan as tolerated.    3/23: Hold ECT for hypotension, pt now with normal vitals.   3/24: Vitals stable. Patient agreeable to discontinue AM ativan dose to continue benzo taper.   3/25: 3rd ECT session  3/28: 4th ECT session, mild disorientation and nausea after procedure; increase sertraline to 150mg  3/30: 5th ECT session, endorses confusion and nausea after procedure  3/31: anxious about ECT side effects, will skip tx tomorrow  4/1: decrease bedtime ativan dose  4/2 Improving with ECT, next 4/4 Will add laxatives, cont to reduce Ativan  4/3 Improved, denies SI. Cont ECT will add Dulcolax dose today can use Dulcolax supp today too. Patient may have used sig  laxatives at home  4/4 6th ECT session, some confusion afterwards  4/5 stable, still anxious about the future but more hopeful, making future plans  4/6: Pt endorses sustained gains in terms of mood and anxiety. Agreed to discontinue nighttime ativan. ECT scheduled for tomorrow morning.   4/8: Pt presents sustained gains in terms of her mood. Utilizing skills learned in CBT to reframe her thoughts. Discharge projected for next week.

## 2022-04-08 NOTE — ECT TREATMENT NOTE - NSECTREVSYS_PSY_ALL_CORE
Cardiovascular/Renal/Urologic/Other

## 2022-04-08 NOTE — ECT TREATMENT NOTE - NSECTCARDIOCOMMENT_PSY_ALL_CORE
HLD, non-obstructive CAD on recent CT

## 2022-04-08 NOTE — ECT TREATMENT NOTE - NSECTFLUMAZ_PSY_ALL_CORE
0.2 mg IV Push

## 2022-04-08 NOTE — ECT PRE-PROCEDURE CHECKLIST - HAND OFF
Unit RN to OR RN

## 2022-04-08 NOTE — ECT TREATMENT NOTE - NSECTCPTCODE_PSY_ALL_CORE
00780 (ECT-Electroconvulsive Therapy)
18717 (ECT-Electroconvulsive Therapy)
69812 (ECT-Electroconvulsive Therapy)
94255 (ECT-Electroconvulsive Therapy)
43836 (ECT-Electroconvulsive Therapy)
75922 (ECT-Electroconvulsive Therapy)
02689 (ECT-Electroconvulsive Therapy)

## 2022-04-09 RX ADMIN — LAMOTRIGINE 150 MILLIGRAM(S): 25 TABLET, ORALLY DISINTEGRATING ORAL at 20:30

## 2022-04-09 RX ADMIN — SERTRALINE 200 MILLIGRAM(S): 25 TABLET, FILM COATED ORAL at 09:38

## 2022-04-09 RX ADMIN — Medication 1 PATCH: at 09:37

## 2022-04-09 RX ADMIN — Medication 1 TABLET(S): at 09:38

## 2022-04-09 RX ADMIN — Medication 1 PATCH: at 19:00

## 2022-04-09 RX ADMIN — Medication 1 PATCH: at 07:34

## 2022-04-09 RX ADMIN — SENNA PLUS 2 TABLET(S): 8.6 TABLET ORAL at 20:30

## 2022-04-09 RX ADMIN — QUETIAPINE FUMARATE 200 MILLIGRAM(S): 200 TABLET, FILM COATED ORAL at 20:30

## 2022-04-09 RX ADMIN — Medication 0.5 MILLIGRAM(S): at 12:54

## 2022-04-09 RX ADMIN — POLYETHYLENE GLYCOL 3350 17 GRAM(S): 17 POWDER, FOR SOLUTION ORAL at 12:54

## 2022-04-09 RX ADMIN — Medication 25 MICROGRAM(S): at 05:17

## 2022-04-09 RX ADMIN — Medication 81 MILLIGRAM(S): at 09:38

## 2022-04-10 LAB — SARS-COV-2 RNA SPEC QL NAA+PROBE: SIGNIFICANT CHANGE UP

## 2022-04-10 RX ADMIN — Medication 1 TABLET(S): at 08:41

## 2022-04-10 RX ADMIN — Medication 1 PATCH: at 19:44

## 2022-04-10 RX ADMIN — Medication 25 MICROGRAM(S): at 06:31

## 2022-04-10 RX ADMIN — SENNA PLUS 2 TABLET(S): 8.6 TABLET ORAL at 21:33

## 2022-04-10 RX ADMIN — SERTRALINE 200 MILLIGRAM(S): 25 TABLET, FILM COATED ORAL at 08:42

## 2022-04-10 RX ADMIN — Medication 1 PATCH: at 08:42

## 2022-04-10 RX ADMIN — Medication 81 MILLIGRAM(S): at 08:42

## 2022-04-10 RX ADMIN — Medication 30 MILLILITER(S): at 11:48

## 2022-04-10 RX ADMIN — QUETIAPINE FUMARATE 200 MILLIGRAM(S): 200 TABLET, FILM COATED ORAL at 21:33

## 2022-04-11 PROCEDURE — 99232 SBSQ HOSP IP/OBS MODERATE 35: CPT

## 2022-04-11 RX ADMIN — SENNA PLUS 2 TABLET(S): 8.6 TABLET ORAL at 21:07

## 2022-04-11 RX ADMIN — Medication 25 MICROGRAM(S): at 06:11

## 2022-04-11 RX ADMIN — PANTOPRAZOLE SODIUM 40 MILLIGRAM(S): 20 TABLET, DELAYED RELEASE ORAL at 06:11

## 2022-04-11 RX ADMIN — Medication 1 TABLET(S): at 10:06

## 2022-04-11 RX ADMIN — Medication 1 PATCH: at 09:00

## 2022-04-11 RX ADMIN — Medication 1 PATCH: at 18:17

## 2022-04-11 RX ADMIN — POLYETHYLENE GLYCOL 3350 17 GRAM(S): 17 POWDER, FOR SOLUTION ORAL at 12:44

## 2022-04-11 RX ADMIN — Medication 1 PATCH: at 06:01

## 2022-04-11 RX ADMIN — Medication 1 PATCH: at 10:07

## 2022-04-11 RX ADMIN — Medication 81 MILLIGRAM(S): at 10:06

## 2022-04-11 RX ADMIN — LAMOTRIGINE 150 MILLIGRAM(S): 25 TABLET, ORALLY DISINTEGRATING ORAL at 21:07

## 2022-04-11 RX ADMIN — SERTRALINE 200 MILLIGRAM(S): 25 TABLET, FILM COATED ORAL at 10:06

## 2022-04-11 RX ADMIN — QUETIAPINE FUMARATE 200 MILLIGRAM(S): 200 TABLET, FILM COATED ORAL at 21:07

## 2022-04-11 NOTE — BH INPATIENT PSYCHIATRY PROGRESS NOTE - CURRENT MEDICATION
MEDICATIONS  (STANDING):  aspirin enteric coated 81 milliGRAM(s) Oral daily  influenza  Vaccine (HIGH DOSE) 0.7 milliLiter(s) IntraMuscular once  lamoTRIgine 150 milliGRAM(s) Oral <User Schedule>  levothyroxine 25 MICROGram(s) Oral daily  multivitamin 1 Tablet(s) Oral daily  nicotine - 21 mG/24Hr(s) Patch 1 patch Transdermal daily  pantoprazole    Tablet 40 milliGRAM(s) Oral <User Schedule>  polyethylene glycol 3350 17 Gram(s) Oral <User Schedule>  QUEtiapine 200 milliGRAM(s) Oral at bedtime  senna 2 Tablet(s) Oral at bedtime  sertraline 200 milliGRAM(s) Oral daily    MEDICATIONS  (PRN):  artificial tears (preservative free) Ophthalmic Solution 1 Drop(s) Left EYE four times a day PRN dry eyes  bisacodyl Suppository 10 milliGRAM(s) Rectal daily PRN constipation  docosanol 10% Cream 1 Application(s) Topical every 6 hours PRN cold sore  magnesium hydroxide Suspension 30 milliLiter(s) Oral <User Schedule> PRN Constipation  nicotine  Polacrilex Gum 2 milliGRAM(s) Oral every 2 hours PRN nicotine craving  sodium chloride 0.65% Nasal 1 Spray(s) Both Nostrils once PRN congestion

## 2022-04-11 NOTE — ECT PRE-PROCEDURE CHECKLIST - NSPROPTRIGHTSUPPORTNAME_GEN_A_NUR
Chau Salazar

## 2022-04-11 NOTE — ECT PRE-PROCEDURE CHECKLIST - PATIENT REPRESENTATIVE: ( YOU CAN CHOOSE ANY PERSON THAT CAN ASSIST YOU WITH YOUR HEALTH CARE PREFERENCES, DOES NOT HAVE TO BE A SPOUSE, IMMEDIATE FAMILY OR SIGNIFICANT OTHER/PARTNER)
same name as above

## 2022-04-11 NOTE — ECT PRE-PROCEDURE CHECKLIST - NSPROPTRIGHTSUPPORTPHONE_GEN_A_NUR
[phone]
[phone]
[phone]
[phone]
[phone]
[phone]
[phone]
[phone]
[phone]
[phone]

## 2022-04-11 NOTE — ECT PRE-PROCEDURE CHECKLIST - NSPROEDALEARNPREF_GEN_A_NUR
computer/internet/individual instruction/verbal instruction

## 2022-04-11 NOTE — ECT PRE-PROCEDURE CHECKLIST - PATIENT'S SEXUAL ORIENTATION
Heterosexual

## 2022-04-11 NOTE — BH INPATIENT PSYCHIATRY PROGRESS NOTE - NSBHFUPINTERVALHXFT_PSY_A_CORE
Patient seen for follow-up for anxiety, depression and recent overdose. Patient is adherent with all medications on the unit. No acute events or PRN medications required overnight. ECT today was cancelled at patient's request. Pt states she feels her memories have been affected after her last ECT on Friday and she would like to stop the treatments at this point. Pt endorses her mood and anxiety have improved significantly since treatment started. Pt tolerating ativan taper, planning to decrease afternoon dose to 0.25mg tomorrow.

## 2022-04-11 NOTE — BH INPATIENT PSYCHIATRY PROGRESS NOTE - NSBHMETABOLIC_PSY_ALL_CORE_FT
BMI: BMI (kg/m2): 24.9 (03-10-22 @ 15:37)  HbA1c: A1C with Estimated Average Glucose Result: 4.8 % (03-05-22 @ 10:01)    Glucose: POCT Blood Glucose.: 113 mg/dL (03-02-22 @ 19:36)    BP: 112/63 (04-09-22 @ 06:59) (112/63 - 112/63)  Lipid Panel: Date/Time: 03-05-22 @ 10:39  Cholesterol, Serum: 148  Direct LDL: --  HDL Cholesterol, Serum: 45  Total Cholesterol/HDL Ration Measurement: --  Triglycerides, Serum: 98

## 2022-04-11 NOTE — BH INPATIENT PSYCHIATRY PROGRESS NOTE - NSBHCHARTREVIEWVS_PSY_A_CORE FT
Vital Signs Last 24 Hrs  T(C): 36.7 (04-11-22 @ 07:40), Max: 36.7 (04-11-22 @ 07:40)  T(F): 98.1 (04-11-22 @ 07:40), Max: 98.1 (04-11-22 @ 07:40)  HR: --  BP: --  BP(mean): --  RR: --  SpO2: --    Orthostatic VS  04-11-22 @ 08:08  Lying BP: --/-- HR: --  Sitting BP: 94/69 HR: 68  Standing BP: --/-- HR: --  Site: upper left arm  Mode: electronic  Orthostatic VS  04-11-22 @ 07:58  Lying BP: --/-- HR: --  Sitting BP: 83/61 HR: 91  Standing BP: --/-- HR: --  Site: --  Mode: --  Orthostatic VS  04-11-22 @ 07:40  Lying BP: --/-- HR: --  Sitting BP: 84/60 HR: 92  Standing BP: --/-- HR: --  Site: upper left arm  Mode: auscultated w/ stethoscope  Orthostatic VS  04-10-22 @ 08:23  Lying BP: --/-- HR: --  Sitting BP: 98/73 HR: 70  Standing BP: --/-- HR: --  Site: upper left arm  Mode: electronic

## 2022-04-12 PROCEDURE — 99232 SBSQ HOSP IP/OBS MODERATE 35: CPT

## 2022-04-12 RX ORDER — LAMOTRIGINE 25 MG/1
150 TABLET, ORALLY DISINTEGRATING ORAL AT BEDTIME
Refills: 0 | Status: DISCONTINUED | OUTPATIENT
Start: 2022-04-12 | End: 2022-04-21

## 2022-04-12 RX ADMIN — Medication 1 PATCH: at 09:39

## 2022-04-12 RX ADMIN — QUETIAPINE FUMARATE 200 MILLIGRAM(S): 200 TABLET, FILM COATED ORAL at 20:15

## 2022-04-12 RX ADMIN — SERTRALINE 200 MILLIGRAM(S): 25 TABLET, FILM COATED ORAL at 08:33

## 2022-04-12 RX ADMIN — SENNA PLUS 2 TABLET(S): 8.6 TABLET ORAL at 20:15

## 2022-04-12 RX ADMIN — Medication 81 MILLIGRAM(S): at 08:33

## 2022-04-12 RX ADMIN — Medication 1 PATCH: at 18:55

## 2022-04-12 RX ADMIN — LAMOTRIGINE 150 MILLIGRAM(S): 25 TABLET, ORALLY DISINTEGRATING ORAL at 20:15

## 2022-04-12 RX ADMIN — POLYETHYLENE GLYCOL 3350 17 GRAM(S): 17 POWDER, FOR SOLUTION ORAL at 08:33

## 2022-04-12 RX ADMIN — Medication 1 PATCH: at 09:53

## 2022-04-12 RX ADMIN — Medication 1 DROP(S): at 14:23

## 2022-04-12 RX ADMIN — Medication 25 MICROGRAM(S): at 06:51

## 2022-04-12 RX ADMIN — Medication 1 TABLET(S): at 08:33

## 2022-04-12 NOTE — BH INPATIENT PSYCHIATRY PROGRESS NOTE - NSBHASSESSSUMMFT_PSY_ALL_CORE
71-year-old woman with documented PPH of Bipolar 2 disorder (though questionable if hx is consistent with ahsan/hypomania), 2 prior suicide attempts (via OD), 2 psych admissions (1987, 1992), no h/o substance abuse, recently switched outpatient providers after her psychiatrist of 30 years (Dr. Kong) retired in late 2021, now transferred to [facility] from [facility] for management of depression after being stabilized for AMS after patient overdosed on 90 tabs of Ativan and several tabs of Lamictal and Seroquel iso 1 year of low mood, anxiety, feelings of worthlessness, hopelessness, and overwhelming loneliness. Patient has had ECT in the past, aim is to restart ECT during current hospitalization. Ms. Salazar appears to be unhappy with her new outpatient treatment team, idealizing Dr. Kong and describing her new psychiatrist and therapist as dismissive and uncaring. Ms. Salazar was tearful while sharing her story but expressed a sense of hope that with ECT she will once again feel like herself again.    Ms Salazar lives alone, she is , has no children, and is retired . She still volunteers sometimes in school and identifies her brother as her main source of support.    Pt now s/p 6 ECT sessions (3/18, 3/21, 3/25, 3/28, 3/30, 4/4). Next ECT schdeduled for this Friday 4/8. Remains adherent with medications and denies side effects. Continues to express anxiety about the future but endorses some improvement in hopelessness and negative self-image as patient engages more in psychotherapy. Now discussing future plans (returning home, selling house, looking at assisted living). Continues to seek reassurance frequently from all team members. Tolerating decrease in bedtime benzo dose (decreased 4/1). Planning for family meeting with brother on Thursday.     1. Legal: 9.27 admission  2. Safety: routine observation, pt denies SI/HI/I/P  3. Psychiatric:   -ECT Mon/Fri (2x per week to limit cognitive side effects)  -Continue Lamictal 150mg QHS, hold Lamictal the night before ECT (hold Sun and Thurs evenings)  -Continue Seroquel 200mg QHS  -Continue Ativan taper (last decreased 4/1), Ativan 0.5mg at 12PM, Ativan 0.25mg qhs  -sertraline 150mg, titrate as tolerated  -pantoprazole 6AM Mon/Fri prior to ECT to prevent nausea  4. Medical:   -Continue Synthroid 25mg daily, senna qhs, ASA 81mg daily  -Pt with elevation of TSH to 7.00 (up from 4.05) and bump in LFTs during medical hospitalization; plan to recheck with routine labs  -nicotine patch and PRN nicotine gum for cravings  -constipation, senna qhs, PRN MOM, PRN dulcolax  -monitor BP, pt with low-normal BPs, episode of symptomatic low BP during this admission    3/19 Depressed, tolerated ECT, cont ECT 3/21 Cont other meds  3/20 Depressed anxious, no SI. Cont ECT will communicate above to psychologist  3/21: Patient continues to be mostly withdrawn. Continues to present overly catastrophic and negative thinking. Case discussed with rest of treatment team. Will continue ECT 3x/ week. Will continue titrating Sertraline and tapering Ativan as tolerated.    3/23: Hold ECT for hypotension, pt now with normal vitals.   3/24: Vitals stable. Patient agreeable to discontinue AM ativan dose to continue benzo taper.   3/25: 3rd ECT session  3/28: 4th ECT session, mild disorientation and nausea after procedure; increase sertraline to 150mg  3/30: 5th ECT session, endorses confusion and nausea after procedure  3/31: anxious about ECT side effects, will skip tx tomorrow  4/1: decrease bedtime ativan dose  4/2 Improving with ECT, next 4/4 Will add laxatives, cont to reduce Ativan  4/3 Improved, denies SI. Cont ECT will add Dulcolax dose today can use Dulcolax supp today too. Patient may have used sig  laxatives at home  4/4 6th ECT session, some confusion afterwards  4/5 stable, still anxious about the future but more hopeful, making future plans  4/6: Pt endorses sustained gains in terms of mood and anxiety. Agreed to discontinue nighttime ativan. ECT scheduled for tomorrow morning.   4/8: Pt presents sustained gains in terms of her mood. Utilizing skills learned in CBT to reframe her thoughts. Discharge projected for next week.   4/11: Pt reports sustained gains in terms of mood and anxiety. Will continue ativan taper.  71-year-old woman with documented PPH of Bipolar 2 disorder (though questionable if hx is consistent with ahsan/hypomania), 2 prior suicide attempts (via OD), 2 psych admissions (1987, 1992), no h/o substance abuse, recently switched outpatient providers after her psychiatrist of 30 years (Dr. Kong) retired in late 2021, now transferred to [facility] from [facility] for management of depression after being stabilized for AMS after patient overdosed on 90 tabs of Ativan and several tabs of Lamictal and Seroquel iso 1 year of low mood, anxiety, feelings of worthlessness, hopelessness, and overwhelming loneliness. Patient has had ECT in the past, aim is to restart ECT during current hospitalization. Ms. Salazar appears to be unhappy with her new outpatient treatment team, idealizing Dr. Kong and describing her new psychiatrist and therapist as dismissive and uncaring. Ms. Salazar was tearful while sharing her story but expressed a sense of hope that with ECT she will once again feel like herself again.    Ms Salazar lives alone, she is , has no children, and is retired . She still volunteers sometimes in school and identifies her brother as her main source of support.    Pt now s/p 6 ECT sessions (3/18, 3/21, 3/25, 3/28, 3/30, 4/4). Next ECT schdeduled for this Friday 4/8. Remains adherent with medications and denies side effects. Continues to express anxiety about the future but endorses some improvement in hopelessness and negative self-image as patient engages more in psychotherapy. Now discussing future plans (returning home, selling house, looking at assisted living). Continues to seek reassurance frequently from all team members. Tolerating decrease in bedtime benzo dose (decreased 4/1). Planning for family meeting with brother on Thursday.     1. Legal: 9.27 admission  2. Safety: routine observation, pt denies SI/HI/I/P  3. Psychiatric:   -ECT Mon/Fri (2x per week to limit cognitive side effects)  -Continue Lamictal 150mg QHS, hold Lamictal the night before ECT (hold Sun and Thurs evenings)  -Continue Seroquel 200mg QHS  -Continue Ativan taper (last decreased 4/1), Ativan 0.5mg at 12PM, Ativan 0.25mg qhs  -sertraline 150mg, titrate as tolerated  -pantoprazole 6AM Mon/Fri prior to ECT to prevent nausea  4. Medical:   -Continue Synthroid 25mg daily, senna qhs, ASA 81mg daily  -Pt with elevation of TSH to 7.00 (up from 4.05) and bump in LFTs during medical hospitalization; plan to recheck with routine labs  -nicotine patch and PRN nicotine gum for cravings  -constipation, senna qhs, PRN MOM, PRN dulcolax  -monitor BP, pt with low-normal BPs, episode of symptomatic low BP during this admission    3/19 Depressed, tolerated ECT, cont ECT 3/21 Cont other meds  3/20 Depressed anxious, no SI. Cont ECT will communicate above to psychologist  3/21: Patient continues to be mostly withdrawn. Continues to present overly catastrophic and negative thinking. Case discussed with rest of treatment team. Will continue ECT 3x/ week. Will continue titrating Sertraline and tapering Ativan as tolerated.    3/23: Hold ECT for hypotension, pt now with normal vitals.   3/24: Vitals stable. Patient agreeable to discontinue AM ativan dose to continue benzo taper.   3/25: 3rd ECT session  3/28: 4th ECT session, mild disorientation and nausea after procedure; increase sertraline to 150mg  3/30: 5th ECT session, endorses confusion and nausea after procedure  3/31: anxious about ECT side effects, will skip tx tomorrow  4/1: decrease bedtime ativan dose  4/2 Improving with ECT, next 4/4 Will add laxatives, cont to reduce Ativan  4/3 Improved, denies SI. Cont ECT will add Dulcolax dose today can use Dulcolax supp today too. Patient may have used sig  laxatives at home  4/4 6th ECT session, some confusion afterwards  4/5 stable, still anxious about the future but more hopeful, making future plans  4/6: Pt endorses sustained gains in terms of mood and anxiety. Agreed to discontinue nighttime ativan. ECT scheduled for tomorrow morning.   4/8: Pt presents sustained gains in terms of her mood. Utilizing skills learned in CBT to reframe her thoughts. Discharge projected for next week.   4/11: Pt reports sustained gains in terms of mood and anxiety. Will continue ativan taper.   4/12: Pt reports some situational anxiety related to making decisions and changes. States that she has been able to utilize her CBT techniques and coping skills to alleviate her anxiety. Patient's brother considering a 28 day rehab program, concerned about patient's use of her benzodiazepines to overdose. Pt now off Ativan, tolerated taper well. Resumed nightly lamotrigine, which had been lowered and held nights before ECT.

## 2022-04-12 NOTE — BH INPATIENT PSYCHIATRY PROGRESS NOTE - NSBHFUPINTERVALHXFT_PSY_A_CORE
Patient seen for follow-up for anxiety, depression and recent overdose. Patient is adherent with all medications on the unit. No acute events or PRN medications required overnight. ECT today was cancelled at patient's request. Pt states she feels her memories have been affected after her last ECT on Friday and she would like to stop the treatments at this point. Pt endorses her mood and anxiety have improved significantly since treatment started. Pt tolerating ativan taper, planning to decrease afternoon dose to 0.25mg tomorrow.  Patient seen for follow-up for anxiety, depression and recent overdose. Patient is adherent with all medications on the unit. No acute events or PRN medications required overnight. Pt reports some situational anxiety related to making decisions and changes. States that she has been able to utilize her CBT techniques and coping skills to alleviate her anxiety. Patient's brother considering a 28 day rehab program, concerned about patient's use of her benzodiazepines to overdose. Pt now off Ativan, tolerated taper well. Resumed nightly lamotrigine, which had been lowered and held nights before ECT.

## 2022-04-12 NOTE — BH INPATIENT PSYCHIATRY PROGRESS NOTE - NSBHCHARTREVIEWVS_PSY_A_CORE FT
Vital Signs Last 24 Hrs  T(C): 36.7 (04-12-22 @ 07:07), Max: 36.7 (04-12-22 @ 07:07)  T(F): 98.1 (04-12-22 @ 07:07), Max: 98.1 (04-12-22 @ 07:07)  HR: --  BP: --  BP(mean): --  RR: --  SpO2: --    Orthostatic VS  04-12-22 @ 07:07  Lying BP: 94/69 HR: 79  Sitting BP: 92/73 HR: 78  Standing BP: --/-- HR: --  Site: --  Mode: --  Orthostatic VS  04-11-22 @ 08:08  Lying BP: --/-- HR: --  Sitting BP: 94/69 HR: 68  Standing BP: --/-- HR: --  Site: upper left arm  Mode: electronic  Orthostatic VS  04-11-22 @ 07:58  Lying BP: --/-- HR: --  Sitting BP: 83/61 HR: 91  Standing BP: --/-- HR: --  Site: --  Mode: --  Orthostatic VS  04-11-22 @ 07:40  Lying BP: --/-- HR: --  Sitting BP: 84/60 HR: 92  Standing BP: --/-- HR: --  Site: upper left arm  Mode: auscultated w/ stethoscope

## 2022-04-12 NOTE — BH INPATIENT PSYCHIATRY PROGRESS NOTE - CURRENT MEDICATION
MEDICATIONS  (STANDING):  aspirin enteric coated 81 milliGRAM(s) Oral daily  influenza  Vaccine (HIGH DOSE) 0.7 milliLiter(s) IntraMuscular once  lamoTRIgine 150 milliGRAM(s) Oral <User Schedule>  levothyroxine 25 MICROGram(s) Oral daily  multivitamin 1 Tablet(s) Oral daily  nicotine - 21 mG/24Hr(s) Patch 1 patch Transdermal daily  pantoprazole    Tablet 40 milliGRAM(s) Oral <User Schedule>  polyethylene glycol 3350 17 Gram(s) Oral <User Schedule>  QUEtiapine 200 milliGRAM(s) Oral at bedtime  senna 2 Tablet(s) Oral at bedtime  sertraline 200 milliGRAM(s) Oral daily    MEDICATIONS  (PRN):  artificial tears (preservative free) Ophthalmic Solution 1 Drop(s) Left EYE four times a day PRN dry eyes  bisacodyl Suppository 10 milliGRAM(s) Rectal daily PRN constipation  docosanol 10% Cream 1 Application(s) Topical every 6 hours PRN cold sore  magnesium hydroxide Suspension 30 milliLiter(s) Oral <User Schedule> PRN Constipation  nicotine  Polacrilex Gum 2 milliGRAM(s) Oral every 2 hours PRN nicotine craving  sodium chloride 0.65% Nasal 1 Spray(s) Both Nostrils once PRN congestion   MEDICATIONS  (STANDING):  aspirin enteric coated 81 milliGRAM(s) Oral daily  influenza  Vaccine (HIGH DOSE) 0.7 milliLiter(s) IntraMuscular once  lamoTRIgine 150 milliGRAM(s) Oral at bedtime  levothyroxine 25 MICROGram(s) Oral daily  multivitamin 1 Tablet(s) Oral daily  nicotine - 21 mG/24Hr(s) Patch 1 patch Transdermal daily  pantoprazole    Tablet 40 milliGRAM(s) Oral <User Schedule>  polyethylene glycol 3350 17 Gram(s) Oral <User Schedule>  QUEtiapine 200 milliGRAM(s) Oral at bedtime  senna 2 Tablet(s) Oral at bedtime  sertraline 200 milliGRAM(s) Oral daily    MEDICATIONS  (PRN):  artificial tears (preservative free) Ophthalmic Solution 1 Drop(s) Left EYE four times a day PRN dry eyes  bisacodyl Suppository 10 milliGRAM(s) Rectal daily PRN constipation  docosanol 10% Cream 1 Application(s) Topical every 6 hours PRN cold sore  magnesium hydroxide Suspension 30 milliLiter(s) Oral <User Schedule> PRN Constipation  nicotine  Polacrilex Gum 2 milliGRAM(s) Oral every 2 hours PRN nicotine craving  sodium chloride 0.65% Nasal 1 Spray(s) Both Nostrils once PRN congestion

## 2022-04-13 PROCEDURE — 99232 SBSQ HOSP IP/OBS MODERATE 35: CPT

## 2022-04-13 PROCEDURE — 90832 PSYTX W PT 30 MINUTES: CPT

## 2022-04-13 RX ADMIN — LAMOTRIGINE 150 MILLIGRAM(S): 25 TABLET, ORALLY DISINTEGRATING ORAL at 20:51

## 2022-04-13 RX ADMIN — QUETIAPINE FUMARATE 200 MILLIGRAM(S): 200 TABLET, FILM COATED ORAL at 20:51

## 2022-04-13 RX ADMIN — SERTRALINE 200 MILLIGRAM(S): 25 TABLET, FILM COATED ORAL at 10:08

## 2022-04-13 RX ADMIN — Medication 25 MICROGRAM(S): at 06:27

## 2022-04-13 RX ADMIN — Medication 1 PATCH: at 10:09

## 2022-04-13 RX ADMIN — Medication 1 TABLET(S): at 10:09

## 2022-04-13 RX ADMIN — Medication 81 MILLIGRAM(S): at 10:08

## 2022-04-13 NOTE — BH INPATIENT PSYCHIATRY PROGRESS NOTE - NSBHCHARTREVIEWVS_PSY_A_CORE FT
Vital Signs Last 24 Hrs  T(C): 36.9 (04-13-22 @ 07:58), Max: 36.9 (04-13-22 @ 07:58)  T(F): 98.5 (04-13-22 @ 07:58), Max: 98.5 (04-13-22 @ 07:58)  HR: --  BP: --  BP(mean): --  RR: --  SpO2: --    Orthostatic VS  04-13-22 @ 07:58  Lying BP: --/-- HR: --  Sitting BP: 100/69 HR: 80  Standing BP: --/-- HR: --  Site: --  Mode: --  Orthostatic VS  04-12-22 @ 07:07  Lying BP: 94/69 HR: 79  Sitting BP: 92/73 HR: 78  Standing BP: --/-- HR: --  Site: --  Mode: --

## 2022-04-13 NOTE — BH PSYCHOLOGY - CLINICIAN PSYCHOTHERAPY NOTE - PROVIDER ATTESTATION
I was present with the psychology trainee during the critical/key portions of the visit. I agree with the findings and plan as documented in the psychology trainee note unless noted below.

## 2022-04-13 NOTE — BH INPATIENT PSYCHIATRY PROGRESS NOTE - CURRENT MEDICATION
MEDICATIONS  (STANDING):  aspirin enteric coated 81 milliGRAM(s) Oral daily  influenza  Vaccine (HIGH DOSE) 0.7 milliLiter(s) IntraMuscular once  lamoTRIgine 150 milliGRAM(s) Oral at bedtime  levothyroxine 25 MICROGram(s) Oral daily  multivitamin 1 Tablet(s) Oral daily  nicotine - 21 mG/24Hr(s) Patch 1 patch Transdermal daily  pantoprazole    Tablet 40 milliGRAM(s) Oral <User Schedule>  polyethylene glycol 3350 17 Gram(s) Oral <User Schedule>  QUEtiapine 200 milliGRAM(s) Oral at bedtime  senna 2 Tablet(s) Oral at bedtime  sertraline 200 milliGRAM(s) Oral daily    MEDICATIONS  (PRN):  artificial tears (preservative free) Ophthalmic Solution 1 Drop(s) Left EYE four times a day PRN dry eyes  bisacodyl Suppository 10 milliGRAM(s) Rectal daily PRN constipation  docosanol 10% Cream 1 Application(s) Topical every 6 hours PRN cold sore  magnesium hydroxide Suspension 30 milliLiter(s) Oral <User Schedule> PRN Constipation  nicotine  Polacrilex Gum 2 milliGRAM(s) Oral every 2 hours PRN nicotine craving  sodium chloride 0.65% Nasal 1 Spray(s) Both Nostrils once PRN congestion

## 2022-04-13 NOTE — BH INPATIENT PSYCHIATRY PROGRESS NOTE - NSBHASSESSSUMMFT_PSY_ALL_CORE
71-year-old woman with documented PPH of Bipolar 2 disorder (though questionable if hx is consistent with ahsan/hypomania), 2 prior suicide attempts (via OD), 2 psych admissions (1987, 1992), no h/o substance abuse, recently switched outpatient providers after her psychiatrist of 30 years (Dr. Kong) retired in late 2021, now transferred to [facility] from [facility] for management of depression after being stabilized for AMS after patient overdosed on 90 tabs of Ativan and several tabs of Lamictal and Seroquel iso 1 year of low mood, anxiety, feelings of worthlessness, hopelessness, and overwhelming loneliness. Patient has had ECT in the past, aim is to restart ECT during current hospitalization. Ms. Salazar appears to be unhappy with her new outpatient treatment team, idealizing Dr. Kong and describing her new psychiatrist and therapist as dismissive and uncaring. Ms. Salazar was tearful while sharing her story but expressed a sense of hope that with ECT she will once again feel like herself again.    Ms Salazar lives alone, she is , has no children, and is retired . She still volunteers sometimes in school and identifies her brother as her main source of support.    Pt now s/p 6 ECT sessions (3/18, 3/21, 3/25, 3/28, 3/30, 4/4). Next ECT schdeduled for this Friday 4/8. Remains adherent with medications and denies side effects. Continues to express anxiety about the future but endorses some improvement in hopelessness and negative self-image as patient engages more in psychotherapy. Now discussing future plans (returning home, selling house, looking at assisted living). Continues to seek reassurance frequently from all team members. Tolerating decrease in bedtime benzo dose (decreased 4/1). Planning for family meeting with brother on Thursday.     1. Legal: 9.27 admission  2. Safety: routine observation, pt denies SI/HI/I/P  3. Psychiatric:   -ECT Mon/Fri (2x per week to limit cognitive side effects)  -Continue Lamictal 150mg QHS, hold Lamictal the night before ECT (hold Sun and Thurs evenings)  -Continue Seroquel 200mg QHS  -Continue Ativan taper (last decreased 4/1), Ativan 0.5mg at 12PM, Ativan 0.25mg qhs  -sertraline 150mg, titrate as tolerated  -pantoprazole 6AM Mon/Fri prior to ECT to prevent nausea  4. Medical:   -Continue Synthroid 25mg daily, senna qhs, ASA 81mg daily  -Pt with elevation of TSH to 7.00 (up from 4.05) and bump in LFTs during medical hospitalization; plan to recheck with routine labs  -nicotine patch and PRN nicotine gum for cravings  -constipation, senna qhs, PRN MOM, PRN dulcolax  -monitor BP, pt with low-normal BPs, episode of symptomatic low BP during this admission    3/19 Depressed, tolerated ECT, cont ECT 3/21 Cont other meds  3/20 Depressed anxious, no SI. Cont ECT will communicate above to psychologist  3/21: Patient continues to be mostly withdrawn. Continues to present overly catastrophic and negative thinking. Case discussed with rest of treatment team. Will continue ECT 3x/ week. Will continue titrating Sertraline and tapering Ativan as tolerated.    3/23: Hold ECT for hypotension, pt now with normal vitals.   3/24: Vitals stable. Patient agreeable to discontinue AM ativan dose to continue benzo taper.   3/25: 3rd ECT session  3/28: 4th ECT session, mild disorientation and nausea after procedure; increase sertraline to 150mg  3/30: 5th ECT session, endorses confusion and nausea after procedure  3/31: anxious about ECT side effects, will skip tx tomorrow  4/1: decrease bedtime ativan dose  4/2 Improving with ECT, next 4/4 Will add laxatives, cont to reduce Ativan  4/3 Improved, denies SI. Cont ECT will add Dulcolax dose today can use Dulcolax supp today too. Patient may have used sig  laxatives at home  4/4 6th ECT session, some confusion afterwards  4/5 stable, still anxious about the future but more hopeful, making future plans  4/6: Pt endorses sustained gains in terms of mood and anxiety. Agreed to discontinue nighttime ativan. ECT scheduled for tomorrow morning.   4/8: Pt presents sustained gains in terms of her mood. Utilizing skills learned in CBT to reframe her thoughts. Discharge projected for next week.   4/11: Pt reports sustained gains in terms of mood and anxiety. Will continue ativan taper.   4/12: Pt reports some situational anxiety related to making decisions and changes. States that she has been able to utilize her CBT techniques and coping skills to alleviate her anxiety. Patient's brother considering a 28 day rehab program, concerned about patient's use of her benzodiazepines to overdose. Pt now off Ativan, tolerated taper well. Resumed nightly lamotrigine, which had been lowered and held nights before ECT.   4/13: Pr reports sustained gains in terms of anxiety. Denies SIIP. Using her coping skills. Discharge planning in process.

## 2022-04-13 NOTE — BH INPATIENT PSYCHIATRY PROGRESS NOTE - NSBHFUPINTERVALHXFT_PSY_A_CORE
Patient seen for follow-up for anxiety, depression and recent overdose. Patient is adherent with all medications on the unit. No acute events or PRN medications required overnight. Pt reports feeling better today in terms of anxiety. Reports stable mood, denies SIIP. Complains of irritation over her groin and rectum, denies discharge, bleeding, pain/burning with urination or defecation. Discharge planning in process.

## 2022-04-14 PROCEDURE — 99232 SBSQ HOSP IP/OBS MODERATE 35: CPT

## 2022-04-14 RX ORDER — ACETAMINOPHEN 500 MG
650 TABLET ORAL ONCE
Refills: 0 | Status: COMPLETED | OUTPATIENT
Start: 2022-04-14 | End: 2022-04-14

## 2022-04-14 RX ADMIN — Medication 81 MILLIGRAM(S): at 08:18

## 2022-04-14 RX ADMIN — Medication 1 PATCH: at 19:53

## 2022-04-14 RX ADMIN — QUETIAPINE FUMARATE 200 MILLIGRAM(S): 200 TABLET, FILM COATED ORAL at 20:49

## 2022-04-14 RX ADMIN — Medication 25 MICROGRAM(S): at 06:14

## 2022-04-14 RX ADMIN — SERTRALINE 200 MILLIGRAM(S): 25 TABLET, FILM COATED ORAL at 08:18

## 2022-04-14 RX ADMIN — LAMOTRIGINE 150 MILLIGRAM(S): 25 TABLET, ORALLY DISINTEGRATING ORAL at 20:51

## 2022-04-14 RX ADMIN — Medication 1 PATCH: at 08:23

## 2022-04-14 RX ADMIN — Medication 1 TABLET(S): at 08:19

## 2022-04-14 RX ADMIN — Medication 650 MILLIGRAM(S): at 16:34

## 2022-04-14 NOTE — BH INPATIENT PSYCHIATRY PROGRESS NOTE - NSBHCHARTREVIEWVS_PSY_A_CORE FT
Vital Signs Last 24 Hrs  T(C): 36.7 (04-14-22 @ 07:43), Max: 36.7 (04-14-22 @ 07:43)  T(F): 98 (04-14-22 @ 07:43), Max: 98 (04-14-22 @ 07:43)  HR: --  BP: --  BP(mean): --  RR: --  SpO2: --    Orthostatic VS  04-14-22 @ 09:05  Lying BP: --/-- HR: --  Sitting BP: 98/71 HR: 85  Standing BP: --/-- HR: --  Site: --  Mode: --  Orthostatic VS  04-14-22 @ 07:43  Lying BP: --/-- HR: --  Sitting BP: 90/68 HR: 83  Standing BP: --/-- HR: --  Site: --  Mode: --  Orthostatic VS  04-13-22 @ 07:58  Lying BP: --/-- HR: --  Sitting BP: 100/69 HR: 80  Standing BP: --/-- HR: --  Site: --  Mode: --

## 2022-04-14 NOTE — BH INPATIENT PSYCHIATRY PROGRESS NOTE - NSBHMETABOLIC_PSY_ALL_CORE_FT
Pt informed his diabetic control is alittle worse. He needs to lose 10lbs to help this BMI: BMI (kg/m2): 24.9 (03-10-22 @ 15:37)  HbA1c: A1C with Estimated Average Glucose Result: 4.8 % (03-05-22 @ 10:01)    Glucose: POCT Blood Glucose.: 113 mg/dL (03-02-22 @ 19:36)    BP: --  Lipid Panel: Date/Time: 03-05-22 @ 10:39  Cholesterol, Serum: 148  Direct LDL: --  HDL Cholesterol, Serum: 45  Total Cholesterol/HDL Ration Measurement: --  Triglycerides, Serum: 98

## 2022-04-14 NOTE — BH INPATIENT PSYCHIATRY PROGRESS NOTE - NSBHASSESSSUMMFT_PSY_ALL_CORE
71-year-old woman with documented PPH of Bipolar 2 disorder (though questionable if hx is consistent with ahsan/hypomania), 2 prior suicide attempts (via OD), 2 psych admissions (1987, 1992), no h/o substance abuse, recently switched outpatient providers after her psychiatrist of 30 years (Dr. Kong) retired in late 2021, now transferred to [facility] from [facility] for management of depression after being stabilized for AMS after patient overdosed on 90 tabs of Ativan and several tabs of Lamictal and Seroquel iso 1 year of low mood, anxiety, feelings of worthlessness, hopelessness, and overwhelming loneliness. Patient has had ECT in the past, aim is to restart ECT during current hospitalization. Ms. Salazar appears to be unhappy with her new outpatient treatment team, idealizing Dr. Kong and describing her new psychiatrist and therapist as dismissive and uncaring. Ms. Salazar was tearful while sharing her story but expressed a sense of hope that with ECT she will once again feel like herself again.    Ms Salazar lives alone, she is , has no children, and is retired . She still volunteers sometimes in school and identifies her brother as her main source of support.    Pt now s/p 6 ECT sessions (3/18, 3/21, 3/25, 3/28, 3/30, 4/4). Next ECT schdeduled for this Friday 4/8. Remains adherent with medications and denies side effects. Continues to express anxiety about the future but endorses some improvement in hopelessness and negative self-image as patient engages more in psychotherapy. Now discussing future plans (returning home, selling house, looking at assisted living). Continues to seek reassurance frequently from all team members. Tolerating decrease in bedtime benzo dose (decreased 4/1). Planning for family meeting with brother on Thursday.     1. Legal: 9.27 admission  2. Safety: routine observation, pt denies SI/HI/I/P  3. Psychiatric:   -ECT Mon/Fri (2x per week to limit cognitive side effects)  -Continue Lamictal 150mg QHS, hold Lamictal the night before ECT (hold Sun and Thurs evenings)  -Continue Seroquel 200mg QHS  -Continue Ativan taper (last decreased 4/1), Ativan 0.5mg at 12PM, Ativan 0.25mg qhs  -sertraline 150mg, titrate as tolerated  -pantoprazole 6AM Mon/Fri prior to ECT to prevent nausea  4. Medical:   -Continue Synthroid 25mg daily, senna qhs, ASA 81mg daily  -Pt with elevation of TSH to 7.00 (up from 4.05) and bump in LFTs during medical hospitalization; plan to recheck with routine labs  -nicotine patch and PRN nicotine gum for cravings  -constipation, senna qhs, PRN MOM, PRN dulcolax  -monitor BP, pt with low-normal BPs, episode of symptomatic low BP during this admission    3/19 Depressed, tolerated ECT, cont ECT 3/21 Cont other meds  3/20 Depressed anxious, no SI. Cont ECT will communicate above to psychologist  3/21: Patient continues to be mostly withdrawn. Continues to present overly catastrophic and negative thinking. Case discussed with rest of treatment team. Will continue ECT 3x/ week. Will continue titrating Sertraline and tapering Ativan as tolerated.    3/23: Hold ECT for hypotension, pt now with normal vitals.   3/24: Vitals stable. Patient agreeable to discontinue AM ativan dose to continue benzo taper.   3/25: 3rd ECT session  3/28: 4th ECT session, mild disorientation and nausea after procedure; increase sertraline to 150mg  3/30: 5th ECT session, endorses confusion and nausea after procedure  3/31: anxious about ECT side effects, will skip tx tomorrow  4/1: decrease bedtime ativan dose  4/2 Improving with ECT, next 4/4 Will add laxatives, cont to reduce Ativan  4/3 Improved, denies SI. Cont ECT will add Dulcolax dose today can use Dulcolax supp today too. Patient may have used sig  laxatives at home  4/4 6th ECT session, some confusion afterwards  4/5 stable, still anxious about the future but more hopeful, making future plans  4/6: Pt endorses sustained gains in terms of mood and anxiety. Agreed to discontinue nighttime ativan. ECT scheduled for tomorrow morning.   4/8: Pt presents sustained gains in terms of her mood. Utilizing skills learned in CBT to reframe her thoughts. Discharge projected for next week.   4/11: Pt reports sustained gains in terms of mood and anxiety. Will continue ativan taper.   4/12: Pt reports some situational anxiety related to making decisions and changes. States that she has been able to utilize her CBT techniques and coping skills to alleviate her anxiety. Patient's brother considering a 28 day rehab program, concerned about patient's use of her benzodiazepines to overdose. Pt now off Ativan, tolerated taper well. Resumed nightly lamotrigine, which had been lowered and held nights before ECT.   4/13: Pr reports sustained gains in terms of anxiety. Denies SIIP. Using her coping skills. Discharge planning in process.   4/14: Ms. Salazar presetns sustained improvement in terms of mood, now fully off Ativan, tolerated taper well. Discharge planning in progress.

## 2022-04-15 PROCEDURE — 99232 SBSQ HOSP IP/OBS MODERATE 35: CPT

## 2022-04-15 RX ADMIN — PANTOPRAZOLE SODIUM 40 MILLIGRAM(S): 20 TABLET, DELAYED RELEASE ORAL at 06:17

## 2022-04-15 RX ADMIN — Medication 1 TABLET(S): at 08:20

## 2022-04-15 RX ADMIN — Medication 25 MICROGRAM(S): at 06:16

## 2022-04-15 RX ADMIN — Medication 1 PATCH: at 08:22

## 2022-04-15 RX ADMIN — SERTRALINE 200 MILLIGRAM(S): 25 TABLET, FILM COATED ORAL at 08:20

## 2022-04-15 RX ADMIN — SENNA PLUS 2 TABLET(S): 8.6 TABLET ORAL at 20:58

## 2022-04-15 RX ADMIN — Medication 81 MILLIGRAM(S): at 08:20

## 2022-04-15 RX ADMIN — QUETIAPINE FUMARATE 200 MILLIGRAM(S): 200 TABLET, FILM COATED ORAL at 20:58

## 2022-04-15 RX ADMIN — LAMOTRIGINE 150 MILLIGRAM(S): 25 TABLET, ORALLY DISINTEGRATING ORAL at 20:58

## 2022-04-15 NOTE — BH INPATIENT PSYCHIATRY PROGRESS NOTE - NSBHASSESSSUMMFT_PSY_ALL_CORE
71-year-old woman with documented PPH of Bipolar 2 disorder (though questionable if hx is consistent with ahsan/hypomania), 2 prior suicide attempts (via OD), 2 psych admissions (1987, 1992), no h/o substance abuse, recently switched outpatient providers after her psychiatrist of 30 years (Dr. Kong) retired in late 2021, now transferred to [facility] from [facility] for management of depression after being stabilized for AMS after patient overdosed on 90 tabs of Ativan and several tabs of Lamictal and Seroquel iso 1 year of low mood, anxiety, feelings of worthlessness, hopelessness, and overwhelming loneliness. Patient has had ECT in the past, aim is to restart ECT during current hospitalization. Ms. Salazar appears to be unhappy with her new outpatient treatment team, idealizing Dr. Kong and describing her new psychiatrist and therapist as dismissive and uncaring. Ms. Salazar was tearful while sharing her story but expressed a sense of hope that with ECT she will once again feel like herself again.    Ms Salazar lives alone, she is , has no children, and is retired . She still volunteers sometimes in school and identifies her brother as her main source of support.    Pt now s/p 6 ECT sessions (3/18, 3/21, 3/25, 3/28, 3/30, 4/4). Next ECT schdeduled for this Friday 4/8. Remains adherent with medications and denies side effects. Continues to express anxiety about the future but endorses some improvement in hopelessness and negative self-image as patient engages more in psychotherapy. Now discussing future plans (returning home, selling house, looking at assisted living). Continues to seek reassurance frequently from all team members. Tolerating decrease in bedtime benzo dose (decreased 4/1). Planning for family meeting with brother on Thursday.     1. Legal: 9.27 admission  2. Safety: routine observation, pt denies SI/HI/I/P  3. Psychiatric:   -ECT Mon/Fri (2x per week to limit cognitive side effects)  -Continue Lamictal 150mg QHS, hold Lamictal the night before ECT (hold Sun and Thurs evenings)  -Continue Seroquel 200mg QHS  -Continue Ativan taper (last decreased 4/1), Ativan 0.5mg at 12PM, Ativan 0.25mg qhs  -sertraline 150mg, titrate as tolerated  -pantoprazole 6AM Mon/Fri prior to ECT to prevent nausea  4. Medical:   -Continue Synthroid 25mg daily, senna qhs, ASA 81mg daily  -Pt with elevation of TSH to 7.00 (up from 4.05) and bump in LFTs during medical hospitalization; plan to recheck with routine labs  -nicotine patch and PRN nicotine gum for cravings  -constipation, senna qhs, PRN MOM, PRN dulcolax  -monitor BP, pt with low-normal BPs, episode of symptomatic low BP during this admission    4/15: Pt completed a total of 7 ECT treatments with good tolerability and response. Sertraline has been titrated up to 200mg QD, Lamictal at 150mg QHS and Seroquel 200mg QHS. Patient has now completed her Ativan taper. Showing sustained improvement in terms of mood, showing gains in terms of anxiety. Pt has been working with psychology, applying and practicing coping skills effectively. Discharge planning in process.

## 2022-04-15 NOTE — BH INPATIENT PSYCHIATRY PROGRESS NOTE - NSBHCHARTREVIEWVS_PSY_A_CORE FT
Vital Signs Last 24 Hrs  T(C): 36.7 (04-15-22 @ 07:55), Max: 36.7 (04-15-22 @ 07:55)  T(F): 98 (04-15-22 @ 07:55), Max: 98 (04-15-22 @ 07:55)  HR: --  BP: --  BP(mean): --  RR: --  SpO2: --    Orthostatic VS  04-15-22 @ 07:55  Lying BP: --/-- HR: --  Sitting BP: 99/73 HR: 75  Standing BP: --/-- HR: --  Site: upper left arm  Mode: electronic  Orthostatic VS  04-14-22 @ 09:05  Lying BP: --/-- HR: --  Sitting BP: 98/71 HR: 85  Standing BP: --/-- HR: --  Site: --  Mode: --  Orthostatic VS  04-14-22 @ 07:43  Lying BP: --/-- HR: --  Sitting BP: 90/68 HR: 83  Standing BP: --/-- HR: --  Site: --  Mode: --

## 2022-04-15 NOTE — BH INPATIENT PSYCHIATRY PROGRESS NOTE - NSBHFUPINTERVALHXFT_PSY_A_CORE
Patient seen for follow-up for anxiety, depression and recent overdose. Patient is adherent with all medications on the unit. No acute events or PRN medications required overnight. Pt discussed ongoing feelings of guilt revolving around her suicide attempt and whether God will forgive her. Processed her emotions in the context of Holy Week and upcoming Easter Sunday. Discussed concepts of forgiveness and rebirth associated with Easter and her own personal journey towards forgiving herself and learning how to better cope with negative thinking and anxiety in the future.

## 2022-04-16 RX ADMIN — Medication 1 PATCH: at 20:48

## 2022-04-16 RX ADMIN — Medication 1 PATCH: at 08:30

## 2022-04-16 RX ADMIN — Medication 81 MILLIGRAM(S): at 08:14

## 2022-04-16 RX ADMIN — Medication 25 MICROGRAM(S): at 06:39

## 2022-04-16 RX ADMIN — SERTRALINE 200 MILLIGRAM(S): 25 TABLET, FILM COATED ORAL at 08:15

## 2022-04-16 RX ADMIN — SENNA PLUS 2 TABLET(S): 8.6 TABLET ORAL at 20:38

## 2022-04-16 RX ADMIN — Medication 1 TABLET(S): at 08:14

## 2022-04-16 RX ADMIN — LAMOTRIGINE 150 MILLIGRAM(S): 25 TABLET, ORALLY DISINTEGRATING ORAL at 20:38

## 2022-04-16 RX ADMIN — QUETIAPINE FUMARATE 200 MILLIGRAM(S): 200 TABLET, FILM COATED ORAL at 20:39

## 2022-04-16 RX ADMIN — Medication 1 PATCH: at 08:14

## 2022-04-17 RX ADMIN — SERTRALINE 200 MILLIGRAM(S): 25 TABLET, FILM COATED ORAL at 08:20

## 2022-04-17 RX ADMIN — Medication 1 PATCH: at 08:20

## 2022-04-17 RX ADMIN — Medication 81 MILLIGRAM(S): at 08:20

## 2022-04-17 RX ADMIN — Medication 1 PATCH: at 19:23

## 2022-04-17 RX ADMIN — Medication 1 PATCH: at 06:40

## 2022-04-17 RX ADMIN — SENNA PLUS 2 TABLET(S): 8.6 TABLET ORAL at 20:29

## 2022-04-17 RX ADMIN — Medication 1 PATCH: at 08:21

## 2022-04-17 RX ADMIN — Medication 1 DROP(S): at 20:28

## 2022-04-17 RX ADMIN — Medication 1 TABLET(S): at 08:20

## 2022-04-17 RX ADMIN — QUETIAPINE FUMARATE 200 MILLIGRAM(S): 200 TABLET, FILM COATED ORAL at 20:28

## 2022-04-17 RX ADMIN — Medication 25 MICROGRAM(S): at 06:35

## 2022-04-17 RX ADMIN — LAMOTRIGINE 150 MILLIGRAM(S): 25 TABLET, ORALLY DISINTEGRATING ORAL at 20:29

## 2022-04-18 PROCEDURE — 99232 SBSQ HOSP IP/OBS MODERATE 35: CPT

## 2022-04-18 PROCEDURE — 90832 PSYTX W PT 30 MINUTES: CPT

## 2022-04-18 RX ORDER — TUBERCULIN PURIFIED PROTEIN DERIVATIVE 5 [IU]/.1ML
5 INJECTION, SOLUTION INTRADERMAL ONCE
Refills: 0 | Status: COMPLETED | OUTPATIENT
Start: 2022-04-18 | End: 2022-04-18

## 2022-04-18 RX ADMIN — SENNA PLUS 2 TABLET(S): 8.6 TABLET ORAL at 20:34

## 2022-04-18 RX ADMIN — Medication 1 TABLET(S): at 09:01

## 2022-04-18 RX ADMIN — Medication 81 MILLIGRAM(S): at 09:01

## 2022-04-18 RX ADMIN — QUETIAPINE FUMARATE 200 MILLIGRAM(S): 200 TABLET, FILM COATED ORAL at 20:34

## 2022-04-18 RX ADMIN — TUBERCULIN PURIFIED PROTEIN DERIVATIVE 5 UNIT(S): 5 INJECTION, SOLUTION INTRADERMAL at 16:47

## 2022-04-18 RX ADMIN — SERTRALINE 200 MILLIGRAM(S): 25 TABLET, FILM COATED ORAL at 09:01

## 2022-04-18 RX ADMIN — PANTOPRAZOLE SODIUM 40 MILLIGRAM(S): 20 TABLET, DELAYED RELEASE ORAL at 06:15

## 2022-04-18 RX ADMIN — LAMOTRIGINE 150 MILLIGRAM(S): 25 TABLET, ORALLY DISINTEGRATING ORAL at 20:34

## 2022-04-18 RX ADMIN — Medication 1 PATCH: at 09:01

## 2022-04-18 RX ADMIN — Medication 30 MILLILITER(S): at 13:36

## 2022-04-18 RX ADMIN — Medication 1 PATCH: at 06:06

## 2022-04-18 RX ADMIN — Medication 25 MICROGRAM(S): at 06:14

## 2022-04-18 RX ADMIN — Medication 1 PATCH: at 22:14

## 2022-04-18 NOTE — BH INPATIENT PSYCHIATRY PROGRESS NOTE - NSBHASSESSSUMMFT_PSY_ALL_CORE
71-year-old woman with documented PPH of Bipolar 2 disorder (though questionable if hx is consistent with ahsan/hypomania), 2 prior suicide attempts (via OD), 2 psych admissions (1987, 1992), no h/o substance abuse, recently switched outpatient providers after her psychiatrist of 30 years (Dr. Kong) retired in late 2021, now transferred to [facility] from [facility] for management of depression after being stabilized for AMS after patient overdosed on 90 tabs of Ativan and several tabs of Lamictal and Seroquel iso 1 year of low mood, anxiety, feelings of worthlessness, hopelessness, and overwhelming loneliness. Patient has had ECT in the past, aim is to restart ECT during current hospitalization. Ms. Salazar appears to be unhappy with her new outpatient treatment team, idealizing Dr. Kong and describing her new psychiatrist and therapist as dismissive and uncaring. Ms. Salazar was tearful while sharing her story but expressed a sense of hope that with ECT she will once again feel like herself again.    Ms Salazar lives alone, she is , has no children, and is retired . She still volunteers sometimes in school and identifies her brother as her main source of support.    Pt now s/p 6 ECT sessions (3/18, 3/21, 3/25, 3/28, 3/30, 4/4). Next ECT schdeduled for this Friday 4/8. Remains adherent with medications and denies side effects. Continues to express anxiety about the future but endorses some improvement in hopelessness and negative self-image as patient engages more in psychotherapy. Now discussing future plans (returning home, selling house, looking at assisted living). Continues to seek reassurance frequently from all team members. Tolerating decrease in bedtime benzo dose (decreased 4/1). Planning for family meeting with brother on Thursday.     1. Legal: 9.27 admission  2. Safety: routine observation, pt denies SI/HI/I/P  3. Psychiatric:   -ECT Mon/Fri (2x per week to limit cognitive side effects)  -Continue Lamictal 150mg QHS, hold Lamictal the night before ECT (hold Sun and Thurs evenings)  -Continue Seroquel 200mg QHS  -Continue Ativan taper (last decreased 4/1), Ativan 0.5mg at 12PM, Ativan 0.25mg qhs  -sertraline 150mg, titrate as tolerated  -pantoprazole 6AM Mon/Fri prior to ECT to prevent nausea  4. Medical:   -Continue Synthroid 25mg daily, senna qhs, ASA 81mg daily  -Pt with elevation of TSH to 7.00 (up from 4.05) and bump in LFTs during medical hospitalization; plan to recheck with routine labs  -nicotine patch and PRN nicotine gum for cravings  -constipation, senna qhs, PRN MOM, PRN dulcolax  -monitor BP, pt with low-normal BPs, episode of symptomatic low BP during this admission    4/15: Pt completed a total of 7 ECT treatments with good tolerability and response. Sertraline has been titrated up to 200mg QD, Lamictal at 150mg QHS and Seroquel 200mg QHS. Patient has now completed her Ativan taper. Showing sustained improvement in terms of mood, showing gains in terms of anxiety. Pt has been working with psychology, applying and practicing coping skills effectively. Discharge planning in process.   4/18: No significant events reported over the weekend. pt is visible in the unit, interacting with peers. Reports stable mood, denies SIIP. Presents situational anxiety related to her discharge planning and coordinating her transition to [facility] and PHP. Spoke with brother over the phone. F/U PPD.

## 2022-04-18 NOTE — BH INPATIENT PSYCHIATRY PROGRESS NOTE - NSBHFUPINTERVALHXFT_PSY_A_CORE
Patient seen for follow-up for anxiety, depression and recent overdose. No significant events reported over the weekend. pt is visible in the unit, interacting with peers. Reports stable mood, denies SIIP. Presents situational anxiety related to her discharge planning and coordinating her transition to [facility] and PHP. Spoke with brother over the phone. F/U PPD.

## 2022-04-18 NOTE — BH INPATIENT PSYCHIATRY PROGRESS NOTE - NSBHCHARTREVIEWVS_PSY_A_CORE FT
Vital Signs Last 24 Hrs  T(C): 36.9 (04-18-22 @ 08:05), Max: 36.9 (04-18-22 @ 08:05)  T(F): 98.4 (04-18-22 @ 08:05), Max: 98.4 (04-18-22 @ 08:05)  HR: --  BP: --  BP(mean): --  RR: --  SpO2: --    Orthostatic VS  04-18-22 @ 08:05  Lying BP: --/-- HR: --  Sitting BP: 94/69 HR: 87  Standing BP: --/-- HR: --  Site: --  Mode: --

## 2022-04-18 NOTE — BH INPATIENT PSYCHIATRY PROGRESS NOTE - CURRENT MEDICATION
MEDICATIONS  (STANDING):  aspirin enteric coated 81 milliGRAM(s) Oral daily  influenza  Vaccine (HIGH DOSE) 0.7 milliLiter(s) IntraMuscular once  lamoTRIgine 150 milliGRAM(s) Oral at bedtime  levothyroxine 25 MICROGram(s) Oral daily  multivitamin 1 Tablet(s) Oral daily  nicotine - 21 mG/24Hr(s) Patch 1 patch Transdermal daily  pantoprazole    Tablet 40 milliGRAM(s) Oral <User Schedule>  polyethylene glycol 3350 17 Gram(s) Oral <User Schedule>  PPD  5 Tuberculin Unit(s) Injectable 5 Unit(s) IntraDermal once  QUEtiapine 200 milliGRAM(s) Oral at bedtime  senna 2 Tablet(s) Oral at bedtime  sertraline 200 milliGRAM(s) Oral daily    MEDICATIONS  (PRN):  artificial tears (preservative free) Ophthalmic Solution 1 Drop(s) Left EYE four times a day PRN dry eyes  bisacodyl Suppository 10 milliGRAM(s) Rectal daily PRN constipation  docosanol 10% Cream 1 Application(s) Topical every 6 hours PRN cold sore  magnesium hydroxide Suspension 30 milliLiter(s) Oral <User Schedule> PRN Constipation  nicotine  Polacrilex Gum 2 milliGRAM(s) Oral every 2 hours PRN nicotine craving  sodium chloride 0.65% Nasal 1 Spray(s) Both Nostrils once PRN congestion

## 2022-04-18 NOTE — BH INPATIENT PSYCHIATRY PROGRESS NOTE - NSBHMETABOLIC_PSY_ALL_CORE_FT
BMI: BMI (kg/m2): 24.9 (03-10-22 @ 15:37)  HbA1c: A1C with Estimated Average Glucose Result: 4.8 % (03-05-22 @ 10:01)    Glucose: POCT Blood Glucose.: 113 mg/dL (03-02-22 @ 19:36)    BP: 90/63 (04-17-22 @ 09:42) (90/63 - 90/63)  Lipid Panel: Date/Time: 03-05-22 @ 10:39  Cholesterol, Serum: 148  Direct LDL: --  HDL Cholesterol, Serum: 45  Total Cholesterol/HDL Ration Measurement: --  Triglycerides, Serum: 98

## 2022-04-19 PROCEDURE — 99232 SBSQ HOSP IP/OBS MODERATE 35: CPT

## 2022-04-19 PROCEDURE — 90853 GROUP PSYCHOTHERAPY: CPT

## 2022-04-19 RX ADMIN — Medication 81 MILLIGRAM(S): at 09:18

## 2022-04-19 RX ADMIN — Medication 25 MICROGRAM(S): at 06:29

## 2022-04-19 RX ADMIN — LAMOTRIGINE 150 MILLIGRAM(S): 25 TABLET, ORALLY DISINTEGRATING ORAL at 20:26

## 2022-04-19 RX ADMIN — QUETIAPINE FUMARATE 200 MILLIGRAM(S): 200 TABLET, FILM COATED ORAL at 20:26

## 2022-04-19 RX ADMIN — Medication 1 TABLET(S): at 09:18

## 2022-04-19 RX ADMIN — SENNA PLUS 2 TABLET(S): 8.6 TABLET ORAL at 20:27

## 2022-04-19 RX ADMIN — SERTRALINE 200 MILLIGRAM(S): 25 TABLET, FILM COATED ORAL at 09:19

## 2022-04-19 RX ADMIN — Medication 1 PATCH: at 09:19

## 2022-04-19 NOTE — BH INPATIENT PSYCHIATRY DISCHARGE NOTE - HOSPITAL COURSE
71-year-old woman with documented PPH of Bipolar 2 disorder (though questionable if hx is consistent with ahsan/hypomania), 2 prior suicide attempts (via OD), 2 psych admissions (1987, 1992), no h/o substance abuse, recently switched outpatient providers after her psychiatrist of 30 years (Dr. Kong) retired in late 2021, now transferred to [facility] from [facility] for management of depression after being stabilized for AMS after patient overdosed on 90 tabs of Ativan and several tabs of Lamictal and Seroquel iso 1 year of low mood, anxiety, feelings of worthlessness, hopelessness, and overwhelming loneliness. Patient has had ECT in the past, aim is to restart ECT during current hospitalization. Ms. Salazar appears to be unhappy with her new outpatient treatment team, idealizing Dr. Kong and describing her new psychiatrist and therapist as dismissive and uncaring. Ms. Salazar was tearful while sharing her story but expressed a sense of hope that with ECT she will once again feel like herself again.    Ms Salazar lives alone, she is , has no children, and is retired . She still volunteers sometimes in school and identifies her brother as her main source of support. 71-year-old woman with documented PPH of Bipolar 2 disorder (though questionable if hx is consistent with ahsan/hypomania), 2 prior suicide attempts (via OD), 2 psych admissions (1987, 1992), no h/o substance abuse, recently switched outpatient providers after her psychiatrist of 30 years (Dr. Kong) retired in late 2021, now transferred to [facility] from [facility] for management of depression after being stabilized for AMS after patient overdosed on 90 tabs of Ativan and several tabs of Lamictal and Seroquel iso 1 year of low mood, anxiety, feelings of worthlessness, hopelessness, and overwhelming loneliness. Patient has had ECT in the past, aim is to restart ECT during current hospitalization. Ms. Salazar appears to be unhappy with her new outpatient treatment team, idealizing Dr. Kong and describing her new psychiatrist and therapist as dismissive and uncaring. Ms. Salazar was tearful while sharing her story but expressed a sense of hope that with ECT she will once again feel like herself again.    Ms Salazar lives alone, she is , has no children, and is retired . She still volunteers sometimes in school and identifies her brother as her main source of support.    Pt completed a total of 7 ECT treatments with good tolerability and response. Sertraline has been titrated up to 200mg QD, Lamictal at 150mg QHS and Seroquel 200mg QHS. Patient has now completed her Ativan taper. Showing sustained improvement in terms of mood, showing gains in terms of anxiety. Ms. Salazar is a 71-year-old woman with documented PPH of Bipolar 2 disorder, 2 prior suicide attempts (via OD), 2 psych admissions (1987, 1992), no h/o substance abuse, recently switched outpatient providers after her psychiatrist of 30 years (Dr. Kong) retired in late 2021, transferred to [facility] from [facility] for management of depression after being stabilized for AMS. Ms. Salazar overdosed on 90 tabs of Ativan and several tabs of Lamictal and Seroquel in the context of depressive sx stemming of 1 year evolution. On initial encounter pt endorsed low mood, anxiety, feelings of worthlessness, hopelessness, and overwhelming loneliness. Pt also expressed feeling abandoned as her psychiatrist of many years is retiring. Patient has had ECT in the past, aim is to restart ECT during current hospitalization.  Ms. Salazar was tearful while sharing her story but expressed a sense of hope that with ECT she will once again feel like herself again. Pt completed a total of 7 ECT treatments with good tolerability and response. Sertraline was started and titrated up to 200mg QD. Patient was continued on Lamictal at 150mg QHS and Seroquel 200mg QHS. Lamictal was previously 125mg QAM + 150mg QHS. Dose was lowered and held nights before ECT. Now back at 150mg QHS, to be titrated up as needed/tolerated in the outpatient setting. Patient has now completed her Ativan taper. She tolerated taper well, showed no signs/symptoms of withdrawal or rebound anxiety. Ms. Salazar also actively participated in individual psychotherapy during her admission, making significant gains developing coping skills and learning how to label and reframe distorted thoughts. Pt presenting sustained improvement in terms of mood, showing gains in terms of anxiety.    On the day of discharge, the patient’s mood and affect are at baseline. Patient denies depressed mood and anxiety. Denies anhedonia, feelings of guilt, hopelessness. Sleep and appetite are also normal.  Pt denies symptoms of psychosis including AH/VH and denies paranoid/persecutory thoughts. Patient denies suicidal or homicidal ideation, intent or plan. Over the past week she has been visible in the unit, interacting with peers and participating in groups. There were no behavioral problems on the unit.  Patient did not become agitated, did not require emergency intramuscular medications or seclusion/restraints, and did not self-harm on the unit. Patient got along appropriately with staff and peers. Family is supportive and available.    Risk assessment completed at time of discharge. Ms. Salazar is considered to be at chronic elevated risk for suicide considering the following static risk factors: 3 prior suicide attempts and 3 prior psychiatric hospitalizations (counting this most recent hospitalization and overdose attempt), patient also presents personality and thought pattern styles such as catastrophizing and black/white thinking which have precipitated and helped perpetuate depression and anxiety in the past. Immediate risk was mitigated by inpatient treatment which limited access to lethal means. Future risk has been mitigated with treatment of patient's depression with both medication and ECT. Patient also participated in individual and group psychotherapy in which she learned to switch previous maladaptive coping patterns with more effective ones. She is learning and applying skills learned in therapy in order to label and reframe distorted thoughts and thinking patterns. Although the patient remains at their chronic baseline risk of self-harm, such risk cannot be further ameliorated by continued inpatient treatment and the patient is therefore appropriate for discharge. Ms. Salazar has made clinically meaningful progress during this hospitalization and has clearly benefited from medications. On day of discharge, the patient has improved significantly and no longer requires inpatient treatment and care. Pt will be discharged to [facility] and follow-up with outpatient care in [facility].

## 2022-04-19 NOTE — BH INPATIENT PSYCHIATRY PROGRESS NOTE - NSBHASSESSSUMMFT_PSY_ALL_CORE
71-year-old woman with documented PPH of Bipolar 2 disorder (though questionable if hx is consistent with ahsan/hypomania), 2 prior suicide attempts (via OD), 2 psych admissions (1987, 1992), no h/o substance abuse, recently switched outpatient providers after her psychiatrist of 30 years (Dr. Kong) retired in late 2021, now transferred to [facility] from [facility] for management of depression after being stabilized for AMS after patient overdosed on 90 tabs of Ativan and several tabs of Lamictal and Seroquel iso 1 year of low mood, anxiety, feelings of worthlessness, hopelessness, and overwhelming loneliness. Patient has had ECT in the past, aim is to restart ECT during current hospitalization. Ms. Salazar appears to be unhappy with her new outpatient treatment team, idealizing Dr. Kong and describing her new psychiatrist and therapist as dismissive and uncaring. Ms. Salazar was tearful while sharing her story but expressed a sense of hope that with ECT she will once again feel like herself again.    Ms Salazar lives alone, she is , has no children, and is retired . She still volunteers sometimes in school and identifies her brother as her main source of support.    Pt now s/p 6 ECT sessions (3/18, 3/21, 3/25, 3/28, 3/30, 4/4). Next ECT schdeduled for this Friday 4/8. Remains adherent with medications and denies side effects. Continues to express anxiety about the future but endorses some improvement in hopelessness and negative self-image as patient engages more in psychotherapy. Now discussing future plans (returning home, selling house, looking at assisted living). Continues to seek reassurance frequently from all team members. Tolerating decrease in bedtime benzo dose (decreased 4/1). Planning for family meeting with brother on Thursday.     1. Legal: 9.27 admission  2. Safety: routine observation, pt denies SI/HI/I/P  3. Psychiatric:   -ECT Mon/Fri (2x per week to limit cognitive side effects)  -Continue Lamictal 150mg QHS, hold Lamictal the night before ECT (hold Sun and Thurs evenings)  -Continue Seroquel 200mg QHS  -Continue Ativan taper (last decreased 4/1), Ativan 0.5mg at 12PM, Ativan 0.25mg qhs  -sertraline 150mg, titrate as tolerated  -pantoprazole 6AM Mon/Fri prior to ECT to prevent nausea  4. Medical:   -Continue Synthroid 25mg daily, senna qhs, ASA 81mg daily  -Pt with elevation of TSH to 7.00 (up from 4.05) and bump in LFTs during medical hospitalization; plan to recheck with routine labs  -nicotine patch and PRN nicotine gum for cravings  -constipation, senna qhs, PRN MOM, PRN dulcolax  -monitor BP, pt with low-normal BPs, episode of symptomatic low BP during this admission    4/15: Pt completed a total of 7 ECT treatments with good tolerability and response. Sertraline has been titrated up to 200mg QD, Lamictal at 150mg QHS and Seroquel 200mg QHS. Patient has now completed her Ativan taper. Showing sustained improvement in terms of mood, showing gains in terms of anxiety. Pt has been working with psychology, applying and practicing coping skills effectively. Discharge planning in process.   4/18: No significant events reported over the weekend. pt is visible in the unit, interacting with peers. Reports stable mood, denies SIIP. Presents situational anxiety related to her discharge planning and coordinating her transition to [facility] and PHP. Spoke with brother over the phone. F/U PPD.   4/19: Ms. Salazar is taking her medications, denies side effects. She is visible in the unit, interacting with peers. Reports stable mood, denies SIIP. Presents situational anxiety related to her discharge planning and coordinating her transition to [facility] and PHP.  71-year-old woman with documented PPH of Bipolar 2 disorder (though questionable if hx is consistent with ahsan/hypomania), 2 prior suicide attempts (via OD), 2 psych admissions (1987, 1992), no h/o substance abuse, recently switched outpatient providers after her psychiatrist of 30 years (Dr. Kong) retired in late 2021, now transferred to [facility] from [facility] for management of depression after being stabilized for AMS after patient overdosed on 90 tabs of Ativan and several tabs of Lamictal and Seroquel iso 1 year of low mood, anxiety, feelings of worthlessness, hopelessness, and overwhelming loneliness. Patient has had ECT in the past, aim is to restart ECT during current hospitalization. Ms. Salazra appears to be unhappy with her new outpatient treatment team, idealizing Dr. Kong and describing her new psychiatrist and therapist as dismissive and uncaring. Ms. Salazar was tearful while sharing her story but expressed a sense of hope that with ECT she will once again feel like herself again.    Ms Salazar lives alone, she is , has no children, and is retired . She still volunteers sometimes in school and identifies her brother as her main source of support.    1. Legal: 9.27 admission  2. Safety: routine observation, pt denies SI/HI/I/P  3. Psychiatric:   -ECT Mon/Fri (2x per week to limit cognitive side effects)  -Continue Lamictal 150mg QHS, hold Lamictal the night before ECT (hold Sun and Thurs evenings)  -Continue Seroquel 200mg QHS  -Continue Ativan taper (last decreased 4/1), Ativan 0.5mg at 12PM, Ativan 0.25mg qhs  -sertraline 150mg, titrate as tolerated  -pantoprazole 6AM Mon/Fri prior to ECT to prevent nausea  4. Medical:   -Continue Synthroid 25mg daily, senna qhs, ASA 81mg daily  -Pt with elevation of TSH to 7.00 (up from 4.05) and bump in LFTs during medical hospitalization; plan to recheck with routine labs  -nicotine patch and PRN nicotine gum for cravings  -constipation, senna qhs, PRN MOM, PRN dulcolax  -monitor BP, pt with low-normal BPs, episode of symptomatic low BP during this admission    4/15: Pt completed a total of 7 ECT treatments with good tolerability and response. Sertraline has been titrated up to 200mg QD, Lamictal at 150mg QHS and Seroquel 200mg QHS. Patient has now completed her Ativan taper. Showing sustained improvement in terms of mood, showing gains in terms of anxiety. Pt has been working with psychology, applying and practicing coping skills effectively. Discharge planning in process.   4/18: No significant events reported over the weekend. pt is visible in the unit, interacting with peers. Reports stable mood, denies SIIP. Presents situational anxiety related to her discharge planning and coordinating her transition to [facility] and [facility]. Spoke with brother over the phone. F/U PPD.   4/19: Ms. Salazar is taking her medications, denies side effects. She is visible in the unit, interacting with peers. Reports stable mood, denies SIIP. Presents situational anxiety related to her discharge planning and coordinating her transition to nursing home and PHP.

## 2022-04-19 NOTE — BH INPATIENT PSYCHIATRY DISCHARGE NOTE - HPI (INCLUDE ILLNESS QUALITY, SEVERITY, DURATION, TIMING, CONTEXT, MODIFYING FACTORS, ASSOCIATED SIGNS AND SYMPTOMS)
Ms. Salazar is a 71 woman with prior psychiatric history of depression and prior suicide attempts (via OD), who carries a hx of Bipolar 2 disaorder, 2 psych admissions (1987, 1992),  no h/o substance abuse, transferred to [facility] from [facility] for management of depression after being stabilized for  AMS related to a suicide attempts via OD on 90 tabs Ativan.    Ms Salazar lives alone, she is , has no children and is retired . She still volunteers sometimes in school and identifies her brother as her main source of support.    On initial assessment upon arrival to  Ms. Salazar is calm and cooperative. She is tearful at times while sharing her story. Reports struggling with depression since her 20ies, starting after her  left her. Reports 1 episode which has been described in the past as a manic episode, however unclear. Pt describes feeling very happy, buying clothes in the context of being in a very happy relationship with a man. She reports having "lots of sex" which she was told was a sign on ahsan, however she reports having sex with just her partner and using protection. Reports sleeping normally during that period of 6 months. Reports that after this she only experienced on and off depressive episodes. Pt states that this episode started about 1 year ago and was worsened by her psychiatrist of 30 years, Dr. Dilan pereira and her close friend moving to [place]. Pt narrates "I can even describe to you the exact moment it started, I was sitting in my living room, I have a bright adrián living room, I looked behind me and it all changed. I thought I can't do "It" anymore...but I don't even know what "it" means." Pt reports low mood, hopelessness, anhedonia over this past year but still pushing herself to volunteer in school and take care of herself. She identifies having her friend move to [place] as a huge trigger as it made her contemplate her own life in comparison, in a way that made her feel less of a "go getter". She denies having planned her suicide attempt, however she told the psych CL team she left a few signed blank checks and a journal entry describing her feelings of depression. Ms. Salazar reports that the day of her overdose she had actually met with her new psychiatrist and found the encounter unhelpful. Describes seeing the pill bottles and again feeling "I can't do it anymore" and drinking  90 tablets of ativan 0.5 mg, 2 tabs Seroquel 200mg and 2 tabs Lamictal 100mg. Her landlord found on the floor.   Pt reports at this time wanting to get better, denies active SIIP and contracts for safety. She reports benefitting from ECT treatments in the past and is on board with trying ECT again. No psychotic/paranoid sx elicited.     As per psych CL assessment: "On initial interview patient is AOx4, calm and cooperative, lying in bed. States that she has been feeling very depressed since her psychiatrist of 35 years retired and her close friend moved to [place] around the same time. Reports she made the plan to suicide via OD by taking 90 tablets of ativan 0.5 mg, 2 tabs Seroquel 200mg and 2 tabs Lamictal 100mg.  With regard to preparations, patient states she left behind her journal which described her feelings of depression and blank checks with her signature before taking the pills. She reports that the attempt was both planned and impulsive; states on the day of, she felt as if "she can't do this anymore." States she is upset that it didn't work.  Endorses persecutory delusions that the "devil is trying to get me." Explains that she feels like shes being punished by the devil. Denies alcohol, tobacco, or drug use. Denies AVH, HI, or current manic sx.     Collateral obtained with permission from brother (Sonny): States patient has a history of depression and anxiety for over 40 years now, which started after a bad divorce. Explains that patient has ongoing specific anxieties (ie: driving, elevators) that she takes Ativan for. Patient recently switched psychiatrists to Dr. Car and medications were changed.  Reports pt has a history of 2 prior psych admissions (1st 40 years ago from cutting and 2nd was 20 years ago from overdose).     Spoke with OP psychiatrist Dr. Car who last saw pt on 2/28; states pt was c/o depression without psychosis or SI at that time, and Lamictal was increased.  Pt had recently self-initiated Lexapro for about 13 days from old supply, which he recommended she discontinue as it had not helped her previously.  Pt was continued on Ativan and Seroquel which she has been on for a long time.  States pt was transferred to his care from the prior psychiatrist who retired in November.

## 2022-04-19 NOTE — BH INPATIENT PSYCHIATRY PROGRESS NOTE - CURRENT MEDICATION
MEDICATIONS  (STANDING):  aspirin enteric coated 81 milliGRAM(s) Oral daily  lamoTRIgine 150 milliGRAM(s) Oral at bedtime  levothyroxine 25 MICROGram(s) Oral daily  multivitamin 1 Tablet(s) Oral daily  nicotine - 21 mG/24Hr(s) Patch 1 patch Transdermal daily  pantoprazole    Tablet 40 milliGRAM(s) Oral <User Schedule>  polyethylene glycol 3350 17 Gram(s) Oral <User Schedule>  QUEtiapine 200 milliGRAM(s) Oral at bedtime  senna 2 Tablet(s) Oral at bedtime  sertraline 200 milliGRAM(s) Oral daily    MEDICATIONS  (PRN):  aluminum hydroxide/magnesium hydroxide/simethicone Suspension 30 milliLiter(s) Oral every 4 hours PRN Dyspepsia  artificial tears (preservative free) Ophthalmic Solution 1 Drop(s) Left EYE four times a day PRN dry eyes  bisacodyl Suppository 10 milliGRAM(s) Rectal daily PRN constipation  docosanol 10% Cream 1 Application(s) Topical every 6 hours PRN cold sore  magnesium hydroxide Suspension 30 milliLiter(s) Oral <User Schedule> PRN Constipation  nicotine  Polacrilex Gum 2 milliGRAM(s) Oral every 2 hours PRN nicotine craving  sodium chloride 0.65% Nasal 1 Spray(s) Both Nostrils once PRN congestion   MEDICATIONS  (STANDING):  aspirin enteric coated 81 milliGRAM(s) Oral daily  lamoTRIgine 150 milliGRAM(s) Oral at bedtime  levothyroxine 25 MICROGram(s) Oral <User Schedule>  multivitamin 1 Tablet(s) Oral daily  nicotine - 21 mG/24Hr(s) Patch 1 patch Transdermal daily  pantoprazole    Tablet 40 milliGRAM(s) Oral <User Schedule>  polyethylene glycol 3350 17 Gram(s) Oral <User Schedule>  QUEtiapine 200 milliGRAM(s) Oral at bedtime  senna 2 Tablet(s) Oral at bedtime  sertraline 200 milliGRAM(s) Oral daily    MEDICATIONS  (PRN):  aluminum hydroxide/magnesium hydroxide/simethicone Suspension 30 milliLiter(s) Oral every 4 hours PRN Dyspepsia  artificial tears (preservative free) Ophthalmic Solution 1 Drop(s) Left EYE four times a day PRN dry eyes  bisacodyl Suppository 10 milliGRAM(s) Rectal daily PRN constipation  docosanol 10% Cream 1 Application(s) Topical every 6 hours PRN cold sore  magnesium hydroxide Suspension 30 milliLiter(s) Oral <User Schedule> PRN Constipation  nicotine  Polacrilex Gum 2 milliGRAM(s) Oral every 2 hours PRN nicotine craving  sodium chloride 0.65% Nasal 1 Spray(s) Both Nostrils once PRN congestion

## 2022-04-19 NOTE — BH INPATIENT PSYCHIATRY DISCHARGE NOTE - NSBHDCMEDICALFT_PSY_A_CORE
Ms. Salazar presented with low BP twice during mornings before ECT which responded well to hydration, likely secondary to being NPO overnight. Pt was asymptomatic. She was continued on her home Synthroid for Hypothyroidism. Pt might benefit from statin therapy in the future, held off on initiating due to mild transaminitis. F/U with Primary care.

## 2022-04-19 NOTE — BH INPATIENT PSYCHIATRY PROGRESS NOTE - NSBHFUPINTERVALHXFT_PSY_A_CORE
Patient seen for follow-up for anxiety, depression and recent overdose. No significant events reported overnight. Ms. Salazar is taking her medications, denies side effects. She is visible in the unit, interacting with peers. Reports stable mood, denies SIIP. Presents situational anxiety related to her discharge planning and coordinating her transition to [facility] and [facility].

## 2022-04-19 NOTE — BH INPATIENT PSYCHIATRY DISCHARGE NOTE - OTHER PAST PSYCHIATRIC HISTORY (INCLUDE DETAILS REGARDING ONSET, COURSE OF ILLNESS, INPATIENT/OUTPATIENT TREATMENT)
Ms. Salazar is a 72 yo female, , domiciled alone, retired , with h/o 2 previous psychiatric hospitalizations (, -3), h/o 2 previous suicide attempts, h/o Bipolar II disorder,  h/o good response to ECT during 3 previous episodes, no h/o substance abuse was transferred to [facility] from [facility] where she was admitted with altered mental status following a suicide attempt via overdosing on 90 tablets of ativan 0.5 mg, 2 tabs Seroquel 200mg and 2 tabs Lamictal 100mg. She was referred for a pre-ECT evaluation.     Ms. Salazar's chart was reviewed and she was interviewed on the unit.  Ms. Salazar reports that her last episode of depression was around  and since then she had been doing well. She started to feel depressed when her girl friend moved to [place] with a very short notice. Reports that she "started to feel Jealous of her. Oxana is a go-getter. She made the decision to move to [place] near her son, made 2 visits to [place], found people to work for her and made all the arrangements to relocate and moved there". In September, her long-term psychiatrist, Dr. Kong announced his care home.  They were already working on changing her medications as she had started to feel depressed from April. Her depression progressively worsened. When her friends and brother would do different activities (e.g. Yoga ) and encourage her to do so, she would feel as being inadequate and as if they were blaming her. She reports "I started to have problem loving myself, comparing to others. I couldn't find any reason to love myself. I tried hard. I don't trust myself".  She attempted suicide by overdosing as she felt that "I have had it".  She had written checks so that her brother can use it towards her .     She currently continues to feel depressed, rates it as 9/10, with 10 being the worst depression. It is worse in the morning. It is associated with anxiety. When she becomes anxious she starts to get angry and upset and starts to think of "Devil" that "Devil" has everything against her and everything is going to go wrong. Her energy is low. Prior to hospitalization, she was not able to cry and now she is crying daily and finds it "cathartic". Her sleep and appetite are normal. She reports passive death wishes. Denies active suicidal ideation/intent/plan. She denies any hallucinations. Towards the end of the interview, she was becoming anxious  that the staff may not save her dinner and started to become angry, using curse words, eventhough she was told that her dinner was saved for her. In those moments she again brought up the feeling that "Devil is against me/ trying to get me".     PAST PSYCHIATRIC HISTORY:  h/o 2 previous suicide attempts (once by cutting and another by overdose). H/o 2 psychiatric hospitalization (, -3), H/o 3 ECT courses in past (once with Dr. Edmonds, Twice with Dr. Kong). H/o atleast 1 hypomanic episode in past.     SOCIAL HISTORY: Patient is  with no children, domiciled alone. Retired . 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 Her brother is her main social support. She has some friends. One of her close friends (Oxana) recently moved to [place].  Denies alcohol or recreational drug use. Uses vapes (e-cigarettes)

## 2022-04-19 NOTE — BH INPATIENT PSYCHIATRY PROGRESS NOTE - PRN MEDS
MEDICATIONS  (PRN):  aluminum hydroxide/magnesium hydroxide/simethicone Suspension 30 milliLiter(s) Oral every 4 hours PRN Dyspepsia  artificial tears (preservative free) Ophthalmic Solution 1 Drop(s) Left EYE four times a day PRN dry eyes  bisacodyl Suppository 10 milliGRAM(s) Rectal daily PRN constipation  docosanol 10% Cream 1 Application(s) Topical every 6 hours PRN cold sore  magnesium hydroxide Suspension 30 milliLiter(s) Oral <User Schedule> PRN Constipation  nicotine  Polacrilex Gum 2 milliGRAM(s) Oral every 2 hours PRN nicotine craving  sodium chloride 0.65% Nasal 1 Spray(s) Both Nostrils once PRN congestion

## 2022-04-19 NOTE — BH INPATIENT PSYCHIATRY PROGRESS NOTE - NSBHCHARTREVIEWVS_PSY_A_CORE FT
Vital Signs Last 24 Hrs  T(C): 36.8 (04-19-22 @ 07:38), Max: 36.8 (04-19-22 @ 07:38)  T(F): 98.2 (04-19-22 @ 07:38), Max: 98.2 (04-19-22 @ 07:38)  HR: --  BP: --  BP(mean): --  RR: --  SpO2: --    Orthostatic VS  04-19-22 @ 07:38  Lying BP: --/-- HR: --  Sitting BP: 104/71 HR: 82  Standing BP: --/-- HR: --  Site: upper left arm  Mode: electronic  Orthostatic VS  04-18-22 @ 08:05  Lying BP: --/-- HR: --  Sitting BP: 94/69 HR: 87  Standing BP: --/-- HR: --  Site: --  Mode: --

## 2022-04-19 NOTE — BH INPATIENT PSYCHIATRY DISCHARGE NOTE - NSDCMRMEDTOKEN_GEN_ALL_CORE_FT
aspirin 81 mg oral delayed release tablet: 1 tab(s) orally once a day  lamoTRIgine 150 mg oral tablet: 1 tab(s) orally once a day (at bedtime)  lamoTRIgine 25 mg oral tablet: 5 tab(s) orally once a day  levothyroxine 25 mcg (0.025 mg) oral tablet: 1 tab(s) orally once a day  LORazepam 0.5 mg oral tablet: 1 tab(s) orally 3 times a day  QUEtiapine 200 mg oral tablet: 1 tab(s) orally once a day (at bedtime)   Adult Aspirin Regimen 81 mg oral delayed release tablet: 1 tab(s) orally once a day  Euthyrox 25 mcg (0.025 mg) oral tablet: 1 tab(s) orally once a day before breakfast  LaMICtal 150 mg oral tablet: 1 tab(s) orally once a day (at bedtime)  polyethylene glycol 3350 oral powder for reconstitution: 17 gram(s) orally once a day   senna oral tablet: 2 tab(s) orally once a day (at bedtime)  SEROquel 200 mg oral tablet: 1 tab(s) orally once a day (at bedtime)  sertraline 100 mg oral tablet: 2 tab(s) orally once a day

## 2022-04-20 LAB — SARS-COV-2 RNA SPEC QL NAA+PROBE: SIGNIFICANT CHANGE UP

## 2022-04-20 PROCEDURE — 99232 SBSQ HOSP IP/OBS MODERATE 35: CPT

## 2022-04-20 PROCEDURE — 90834 PSYTX W PT 45 MINUTES: CPT | Mod: 59

## 2022-04-20 PROCEDURE — 90853 GROUP PSYCHOTHERAPY: CPT

## 2022-04-20 RX ORDER — SERTRALINE 25 MG/1
2 TABLET, FILM COATED ORAL
Qty: 60 | Refills: 0
Start: 2022-04-20 | End: 2022-05-19

## 2022-04-20 RX ORDER — POLYETHYLENE GLYCOL 3350 17 G/17G
17 POWDER, FOR SOLUTION ORAL
Qty: 510 | Refills: 0
Start: 2022-04-20 | End: 2022-05-19

## 2022-04-20 RX ORDER — LAMOTRIGINE 25 MG/1
1 TABLET, ORALLY DISINTEGRATING ORAL
Qty: 30 | Refills: 0
Start: 2022-04-20 | End: 2022-05-19

## 2022-04-20 RX ORDER — ASPIRIN/CALCIUM CARB/MAGNESIUM 324 MG
1 TABLET ORAL
Qty: 30 | Refills: 0
Start: 2022-04-20 | End: 2022-05-19

## 2022-04-20 RX ORDER — QUETIAPINE FUMARATE 200 MG/1
1 TABLET, FILM COATED ORAL
Qty: 30 | Refills: 0
Start: 2022-04-20 | End: 2022-05-19

## 2022-04-20 RX ORDER — LEVOTHYROXINE SODIUM 125 MCG
1 TABLET ORAL
Qty: 30 | Refills: 0
Start: 2022-04-20 | End: 2022-05-19

## 2022-04-20 RX ORDER — SENNA PLUS 8.6 MG/1
2 TABLET ORAL
Qty: 60 | Refills: 0
Start: 2022-04-20 | End: 2022-05-19

## 2022-04-20 RX ADMIN — Medication 1 PATCH: at 16:48

## 2022-04-20 RX ADMIN — Medication 1 PATCH: at 18:10

## 2022-04-20 RX ADMIN — LAMOTRIGINE 150 MILLIGRAM(S): 25 TABLET, ORALLY DISINTEGRATING ORAL at 20:44

## 2022-04-20 RX ADMIN — Medication 1 DROP(S): at 20:43

## 2022-04-20 RX ADMIN — Medication 1 PATCH: at 09:54

## 2022-04-20 RX ADMIN — Medication 25 MICROGRAM(S): at 06:54

## 2022-04-20 RX ADMIN — POLYETHYLENE GLYCOL 3350 17 GRAM(S): 17 POWDER, FOR SOLUTION ORAL at 12:48

## 2022-04-20 RX ADMIN — Medication 81 MILLIGRAM(S): at 09:59

## 2022-04-20 RX ADMIN — Medication 1 TABLET(S): at 09:54

## 2022-04-20 RX ADMIN — SENNA PLUS 2 TABLET(S): 8.6 TABLET ORAL at 20:44

## 2022-04-20 RX ADMIN — QUETIAPINE FUMARATE 200 MILLIGRAM(S): 200 TABLET, FILM COATED ORAL at 20:45

## 2022-04-20 RX ADMIN — TUBERCULIN PURIFIED PROTEIN DERIVATIVE 5 UNIT(S): 5 INJECTION, SOLUTION INTRADERMAL at 16:09

## 2022-04-20 RX ADMIN — SERTRALINE 200 MILLIGRAM(S): 25 TABLET, FILM COATED ORAL at 09:54

## 2022-04-20 NOTE — BH PSYCHOLOGY - CLINICIAN PSYCHOTHERAPY NOTE - NSTXDCOTHRDATEEST_PSY_ALL_CORE
13-Apr-2022
25-Mar-2022
25-Mar-2022
17-Mar-2022
25-Mar-2022
13-Apr-2022
25-Mar-2022
17-Mar-2022
13-Apr-2022
25-Mar-2022
17-Mar-2022
25-Mar-2022
11-Mar-2022

## 2022-04-20 NOTE — BH DISCHARGE NOTE NURSING/SOCIAL WORK/PSYCH REHAB - NSDCINSTRUCTNUMBER_PSY_ALL_CORE
Dear Yarelis Penny    Thank you for choosing [facility] ([facility]) for your infusion.  The following information is a summary of our appointment as well as important reminders.      POST-INFUSION OF BIOLOGICAL MEDICATION:    Reviewed with patient.  Given biologic medication or medication hand-out. Inform patient if any fever, chills or signs of infection, new symptoms, abdominal pain, heart palpitations, shortness of breath, reaction, weakness, neurological changes, seek medical attention immediately and should not receive infusions. No live virus vaccines prior to or during treatment or up to 6 months post infusion. If the patient has an upcoming procedure or surgery, this should be discussed with the rheumatologist and surgeon or provider.    We look forward in seeing you on your next appointment here at [facility].  Please don t hesitate to call us at [phone] to reschedule any of your appointments or to speak with one of the [facility] registered nurses.  It was a pleasure taking care of you today.    Sincerely,    [facility]  [address] [place] [address]  [id number]  Phone:  [phone]    
[facility]  [phone]

## 2022-04-20 NOTE — BH PSYCHOLOGY - CLINICIAN PSYCHOTHERAPY NOTE - NSBHPSYCHOLGOALS_PSY_A_CORE
Prevent relapse/Psychoeducation/Treatment compliance
Improve social/vocational/coping skills/Prevent relapse/Psychoeducation
Decrease symptoms/Improve social/vocational/coping skills/Psychoeducation
Decrease symptoms/Improve social/vocational/coping skills
Assessment/Improve social/vocational/coping skills/Psychoeducation/Treatment compliance
Decrease symptoms/Improve social/vocational/coping skills/Prevent relapse/Psychoeducation
Decrease symptoms/Improve social/vocational/coping skills/Prevent relapse/Psychoeducation/Treatment compliance
Decrease symptoms/Improve level of independent functioning/Prevent relapse/Treatment compliance
Decrease symptoms/Improve social/vocational/coping skills/Psychoeducation
Decrease symptoms/Improve social/vocational/coping skills/Psychoeducation
Decrease symptoms/Improve social/vocational/coping skills/Psychoeducation/Treatment compliance

## 2022-04-20 NOTE — BH PSYCHOLOGY - CLINICIAN PSYCHOTHERAPY NOTE - NSTXPROBSUICID_PSY_ALL_CORE
SUICIDE/SELF-INJURIOUS BEHAVIOR

## 2022-04-20 NOTE — BH PSYCHOLOGY - CLINICIAN PSYCHOTHERAPY NOTE - NSBHPSYCHOLNARRATIVE_PSY_A_CORE FT
Psychologist met with the patient for an individual therapy session at the request of the treatment team and consent of the patient. Patient and psychologist continued to prepare for the patient’s upcoming discharge. The patient shared concerns associated with her discharge, expressing themes associated with helplessness, anxiety, and disappointment.  Patient reported challenges with self-soothing and addressing negative thoughts. In order to increase her ability to tolerate distress, patient practiced coping skills (i.e., noticing her senses and paced breathing). Patient then engaged in negative thought challenging using worksheets to aid her process (ABC worksheet). Patient reported slightly improved mood and reduced anxiety following exercises. Patient welcomed psychology f/u and thanked psychologist for session. In outside communication, Psychologist provided updates to physician and IDT.    Appearance: Patient appeared their stated age, appropriately attired.   Cognition and sensorium: Patient appeared awake and alert.  Memory appeared sufficient for purposes of this session.   Behavior: Patient was generally cooperative. Eye contact good and appropriate.   Motor: Pt was observed fidgeting (e.g. shifting in her bed, rubbing her hands) when discussing emotionally evocative topics. She was also observed shaking, which she stated was in response to worries about her discharge.  Gait: Steady  Speech:  Volume, rate, tone of expressive speech were normal. Quality was slightly emotional, quantity was talkative.   Mood: Reported as "not good," rating it a “8/10” (10 = best)   Affect: Appeared anxious, slightly restricted in range compared with previous sessions, though appearing broader and more euthymic as session wore on. Congruent with reported thought/mood.   Thought process (observed):  Slightly negativistic; perseverating on catastrophic fears.   Thought Content: Concerns with abandonment and helplessness, but redirectable. Patient did not endorse Auditory Hallucinations or Visual Hallucinations.  Pt did not endorse suicidal/ homicidal ideation, intent, plan, or urge to self-harm.  Attention/Concentration: Good.   Impulse control: good  Insight:  partial to fair – acknowledges existence of problem but still somewhat uncertain of full extent of challenges  Judgment: fair to good as evidenced by her desire to engage in treatment today and her completion of homework.
Psychologist met with the patient (pt) for an individual therapy session at the request of the treatment team and consent of the pt. Patient reported improved mood and discussed challenges/worries associated with discharge. In order to increase her ability to tolerate distress and shift focus to positive, patient was tasked with practicing coping skills (i.e. noticing senses, acknowledging emotions, mindfulness, paced breathing). Patient reported improved self-efficacy and mood following exercises. Patient welcomed psychology f/u and thanked psychologist for session. In outside communication, Psychologist provided updates to physician and IDT.    Appearance: Patient appeared their stated age, appropriately attired.   Cognition and sensorium: Patient appeared awake and alert.  Memory appeared sufficient for purposes of this session.   Behavior: Patient was generally cooperative. Eye contact was generally good and appropriate.   Motor: Pt was observed occasionally fidgeting.  Gait: Steady  Speech:  Volume, rate, tone of expressive speech were normal. Quality was slightly emotional, quantity was talkative.   Mood: Reported as "good," rating it a “8/10” (10 = best), and stating her mood improved following exercises.   Affect: Appeared slightly anxious, somewhat broader in range compared with previous sessions. Congruent with reported thought/mood.   Thought process (observed):  Overinclusive, linear, logical. An improvement from earlier sessions in which she was noted perseverating on frustrations, concerns about helplessness, and catastrophic fears.  Thought Content: Patient did not endorse Auditory Hallucinations or Visual Hallucinations.  Pt did not endorse suicidal/ homicidal ideation, intent, plan, or urge to self-harm.  Attention/Concentration: good.    Impulse control: good  Insight:  partial to fair – acknowledges existence of problem but still somewhat uncertain of full extent of challenges  Judgment: fair as evidenced by her desire to engage in treatment today  
Psychologist met with the patient for an individual therapy session at the request of the treatment team and consent of the patient. Patient completed homework from previous session, reporting practice of coping skills to be helpful. Patient discussed challenges/worries associated with upcoming family meeting (especially surrounding fears of abandonment).     Psychologist provided psychoeducation regarding distress tolerance, emotions, and cognitive restructuring. In order to increase her ability to tolerate distress, patient practiced coping skills (i.e., noticing her senses and paced breathing). Patient then engaged in negative thought challenging using worksheets to aid her process. Patient reported improved mood and reduced anxiety following exercises. Patient and psychologist collaborated on homework focused thought challenging, providing additional negative beliefs for the patient to challenge. Patient agreed to complete homework before next session. Patient welcomed psychology f/u and thanked psychologist for session. In outside communication, Psychologist provided updates to physician and IDT.    Appearance: Patient appeared their stated age, appropriately attired.   Cognition and sensorium: Patient appeared awake and alert.  Memory appeared sufficient for purposes of this session.   Behavior: Patient was generally cooperative. Eye contact good and appropriate.   Motor: Pt was observed occasionally fidgeting.  Gait: Steady  Speech:  Volume, rate, tone of expressive speech were normal. Quality was slightly emotional, quantity was talkative.   Mood: Reported as "good," rating it a “7/10” (10 = best), and stating her mood improved following thought challenging and mindfulness exercises.   Affect: Appeared slightly anxious, somewhat broader in range compared with previous sessions. Congruent with reported thought/mood.   Thought process (observed):  Linear, logical.  Thought Content: Concerns with abandonment and helplessness, but redirectable. Patient did not endorse Auditory Hallucinations or Visual Hallucinations.  Pt did not endorse suicidal/ homicidal ideation, intent, plan, or urge to self-harm.  Attention/Concentration: good.    Impulse control: good  Insight:  partial to fair – acknowledges existence of problem but still somewhat uncertain of full extent of challenges  Judgment: fair to good as evidenced by her desire to engage in treatment today and her completion of homework.  
Psychologist alongside [facility] Psychiatrist and  met with the patient and her brother to discuss discharge planning. Patient shared some worries regarding her discharge, but was able to engage in cognitive restructuring with light scaffolding. Patient and her brother had their questions answered. Brother responded well to empathy and validation of his emotional experience. 
  Psychologist met with the patient (pt) for an individual therapy session at the request of the treatment team and consent of the pt. Patient discussed issues related to current hospitalization. Themes in session included helplessness and concerns with emotion regulation. Patient discussed challenges with emotion regulation since last session, experiencing her emotions as “up and down.” Psychologist engaged in behavior chain exercise with patient, identifying catastrophic negative beliefs.  Psychologist provided psychoeducation on the stress-vulnerability model, emotion identification, emotion regulation, and coping skills; offering the patient handouts to aid in learning and recall of information. Patient reported being unfamiliar with emotion regulation skills. She reported being willing to review handouts and practice coping skills discussed in previous session. Patient welcomed psychology f/u and thanked psychologist for session. In outside communication, Psychologist provided updates to physician and IDT.      Appearance: Patient appeared their stated age, appropriately attired. She was seen in the [address] [place] [address] dining room.  Cognition and sensorium: Patient appeared awake and alert.  Memory appeared sufficient for purposes of this session.   Behavior: Patient was generally cooperative. Eye contact was intense at times, though generally fair and appropriate.   Motor: Pt was observed occasionally fidgeting.  Gait: Steady  Speech:  Volume, rate, tone of expressive speech were normal. Quantity was talkative and overinclusive.   Mood: Reported as "not so good," rating it “1/10,” (10 = best) at outset. Rating improved to “8/10” toward end of session.  Affect: Appeared anxious, restricted. Congruent with reported thought/mood.   Thought process (observed):  Overinclusive, negativistic, and perseverating on frustrations and catastrophic fears  Thought Content: Patient did not endorse Auditory Hallucinations or Visual Hallucinations.  Pt did not endorse suicidal/ homicidal ideation, intent, plan, or urge to self-harm.  Attention/Concentration: fair. Patient appeared distracted by stimuli in day room.   Impulse control: fair. Patient interrupted psychologist on multiple occasions.  Insight:  partial – acknowledges existence of problem but unclear as to problematic areas and full extent of challenges  Judgment: fair as evidenced by her desire to engage in treatment today  
Psychologist met with the patient (pt) for an individual therapy session at the request of the treatment team and consent of the pt. Psychologist and Patient reviewed homework focused on negative thought challenging and distress tolerance. Patient engaged in exercise focused on negative thought challenging and identification of alternative beliefs. Patient was able to create new effective thoughts, reporting the helped reduce her anxiety.  She reported being willing to review handouts and practice skills learned in session. Patient welcomed psychology f/u and thanked psychologist for session. In outside communication, Psychologist provided updates to physician and IDT.      Appearance: Patient appeared their stated age, appropriately attired.   Cognition and sensorium: Patient appeared awake and alert.  Memory appeared sufficient for purposes of this session.   Behavior: Patient was generally cooperative. Eye contact was generally fair and appropriate.   Motor: Pt was observed occasionally fidgeting.  Gait: Steady  Speech:  Volume, rate, tone of expressive speech were normal. Quality was emotional, quantity was talkative and overinclusive.   Mood: Reported as "not good," rating it “4/10,” (10 = best). An improvement from previous session where she rated her mood “1/10.”  At end of session, she reported her mood had improved (“5/10”).  Affect: Appeared anxious, restricted. Congruent with reported thought/mood.   Thought process (observed):  Overinclusive, negativistic, and perseverating on frustrations and catastrophic fears  Thought Content: Patient did not endorse Auditory Hallucinations or Visual Hallucinations.  Pt did not endorse suicidal/ homicidal ideation, intent, plan, or urge to self-harm.  Attention/Concentration: fair.    Impulse control: limited. Patient interrupted psychologist once, walking away from session without warning to briefly speak to a fellow patient.  Insight:  partial – acknowledges existence of problem but unclear as to problematic areas and full extent of challenges  Judgment: fair as evidenced by her desire to engage in treatment today  
Psychologist met with the patient for an individual therapy session at the request of the treatment team and consent of the patient. Patient did not complete homework from previous session. Patient reported improved mood and discussed challenges/worries associated with discharge, with the patient sharing longstanding difficulties managing her emotions when faced with discomfort (especially surrounding fears of abandonment).     Psychologist provided psychoeducation regarding distress tolerance and depression. In order to increase her ability to tolerate distress, patient practiced coping skills was tasked with practicing coping skills (i.e., paced breathing, mindful hug). Patient reported improved mood and reduced anxiety following exercises. Psychologist provided psychoeducation on the importance of practicing distress tolerance skills. Patient and psychologist collaborated on homework focused on distress tolerance (noticing senses, mindfulness, paced breathing, mindful hug). Patient agreed to complete homework before next session. Patient welcomed psychology f/u and thanked psychologist for session. In outside communication, Psychologist provided updates to physician and IDT.    Appearance: Patient appeared their stated age, appropriately attired.   Cognition and sensorium: Patient appeared awake and alert.  Memory appeared sufficient for purposes of this session.   Behavior: Patient was generally cooperative. Eye contact was generally good and appropriate.   Motor: Pt was observed occasionally fidgeting.  Gait: Steady  Speech:  Volume, rate, tone of expressive speech were normal. Quality was slightly emotional, quantity was talkative.   Mood: Reported as "good," rating it a “8 or 9/10” (10 = best), and stating her mood improved following exercise.   Affect: Appeared slightly anxious, somewhat broader in range compared with previous sessions. Congruent with reported thought/mood.   Thought process (observed):  At times - overinclusive, though reduced from previous sessions. Linear, logical in general. An improvement from earlier sessions in which she was noted perseverating on frustrations, concerns about helplessness, and catastrophic fears.  Thought Content: Patient did not endorse Auditory Hallucinations or Visual Hallucinations.  Pt did not endorse suicidal/ homicidal ideation, intent, plan, or urge to self-harm.  Attention/Concentration: good.    Impulse control: good  Insight:  partial to fair – acknowledges existence of problem but still somewhat uncertain of full extent of challenges  Judgment: fair as evidenced by her desire to engage in treatment today  
Psychologist met with the patient (pt) for an individual therapy session at the request of the treatment team and consent of the pt. Confidentiality and its limits were discussed; patient verbalized understanding of those limits. Patient discussed issues related to current hospitalization; themes included helplessness, frustration tolerance, and anxiety. Psychologist provided psychoeducation on coping skills and challenging negative thoughts. Patient identified past coping skills that have been helpful in reducing her anxiety and improving her mood, listing most effective skills. With some scaffolding, patient was able to engage in thought challenging. Psychologist encouraged patient to practice the coping skills she listed while also tracking her mood following practice. Patient welcomed psychology f/u and thanked psychologist for session. In outside communication, Psychologist provided updates to physician and IDT.    In order to better understand her symptoms, this Psychologist administered the following symptom inventories: Geriatric Anxiety Scale -10 item form (GAS-10), and the Geriatric Depression Scale -Short Form (GDS-SF). Results from these measures indicate that the patient is mildly depressed (GDS-SF = 7/15) and is experiencing a high level of anxiety (GAS-10 = 13/30). The patient reported these results to be in line with their subjective experience.       Appearance: Patient appeared their stated age, appropriately attired.   Cognition and sensorium: Patient appeared awake and alert.  Memory appeared sufficient for purposes of this session.   Behavior: Patient was generally cooperative. Eye contact was intense at times, though generally fair and appropriate.   Motor: Pt was observed occasionally fidgeting.  Gait: Steady  Speech:  Volume, rate, tone of expressive speech were normal. Quantity was talkative and overinclusive.   Mood: Reported as "anxious," rating it “1/10” (10 = best)   Affect: Appeared slightly anxious, slightly restricted. Congruent with reported thought/mood.   Thought process (observed):  Negativistic and perseverating on frustrations  Thought Content: Patient did not endorse Auditory Hallucinations or Visual Hallucinations.  Pt did not endorse suicidal/ homicidal ideation, intent, plan, or urge to self-harm.  Attention/Concentration: good.   Impulse control: good.  Insight:  partial – acknowledges existence of problem but unclear as to problematic areas and full extent of challenges  Judgment: fair as evidenced by her desire to engage in treatment today  
The patient was seen individually for psychotherapy for depression at the team’s request and with her consent; this meeting took place in lieu of her assigned psychologist who was out today. Patient was fully alert, oriented and engaged despite ECT this morning. We reviewed homework focused on negative thought challenging. Patient was unsure if she was doing it correctly and had written significantly in her notebook over the weekend. She described feeling “like a failure” and connected it to feeling guilty about the impact of her behavior on her brother and friend. We reviewed alternatives to the idea of “failure” by citing evidence of past success and coping; substituting the word “failure” with “mistake” (along with concomitant emotional associations) and understanding her idea as also related top over-learned self-talk associated with low self-esteem. Patient validated these alternatives as making sense to her and took notes. She also raised the fear that she will become a “recluse” when she returns home and will feel suicidal again. We explored and gave voice to alternative scenarios upon her return home and leveraged the improved mood she reports at this time. Patient appreciated the session and looks forward to resuming with her assigned psychologist. 
  Psychologist met with the patient for an individual therapy session at the request of the treatment team and consent of the patient. Patient and psychologist continued to prepare for the patient’s upcoming discharge by improving the patient’s ability to tolerate distress and face challenges. Psychologist provided psychoeducation on distress tolerance skills (i.e. DBT TIP skills). Patient engaged in distress tolerance exercises (i.e. paced breathing, progressive muscle relaxation) and subsequently reported feeling “calmer.” Patient welcomed psychology f/u and thanked psychologist for session. In outside communication, Psychologist provided updates to physician and IDT.    Appearance: Patient appeared their stated age, appropriately attired.   Cognition and sensorium: Patient appeared awake and alert.  Memory appeared sufficient for purposes of this session.   Behavior: Patient was generally calm and cooperative. Eye contact good and appropriate.   Motor: Pt was observed fidgeting (e.g. shifting in her bed, rubbing her hands) when discussing emotionally evocative topics.   Gait: Steady  Speech:  Volume, rate, tone of expressive speech were normal. Quality was slightly emotional, quantity was talkative.   Mood: Reported as "pretty good," rating it a “7/10” (10 = best)   Affect: Appeared slightly anxious, slightly restricted in range compared with previous sessions, though appearing broader, improving to euthymic as session wore on. Congruent with reported thought/mood.   Thought process (observed):  Slightly negativistic; some perseveration noted- focused on catastrophic fears and negative beliefs – however, patient was able to redirect herself  Thought Content: Concerns with abandonment and helplessness, but redirectable. Patient did not endorse Auditory Hallucinations or Visual Hallucinations.  Pt did not endorse suicidal/ homicidal ideation, intent, plan, or urge to self-harm.  Attention/Concentration: Good.   Impulse control: good  Insight:  partial to fair – acknowledges existence of problem but still somewhat uncertain of full extent of challenges  Judgment: fair to good as evidenced by her desire to engage in treatment today.  
Psychologist met with the patient (pt) for an individual therapy session at the request of the treatment team and consent of the pt. As psychologist was out yesterday, patient was seen by covering psychologist. We discussed yesterday’s session with covering psychologist and reviewed skills patient has learned thus far. Psychologist outlined patient’s negative pattern of thought/behavior that contribute to her experience of depression (e.g. beliefs about helplessness, avoidance behaviors). We also discussed the patient’s tendency to shift responsibility to others as a means of avoiding distress. Patient stated that both the negative patterns and tendency to shift responsibility resonate with her and are longstanding behaviors. Psychologist inquired throughout session whether the patient had any questions or wished for psychologist to elaborate on information learned in session. Patient denied any concerns or questions regarding course of therapy or information learned. In order to increase her ability to tolerate distress and shift focus to positive, patient was tasked with practicing coping skills (i.e. relaxation, mindfulness, paced breathing) and listing positive values and traits about herself. Patient welcomed psychology f/u and thanked psychologist for session. In outside communication, Psychologist provided updates to physician and IDT.    Appearance: Patient appeared their stated age, appropriately attired.   Cognition and sensorium: Patient appeared awake and alert.  Memory appeared sufficient for purposes of this session.   Behavior: Patient was generally cooperative. Eye contact was generally fair and appropriate.   Motor: Pt was observed occasionally fidgeting.  Gait: Steady  Speech:  Volume, rate, tone of expressive speech were normal. Quality was emotional, quantity was talkative and overinclusive.   Mood: Reported as "ok," but did not provide a rating   Affect: Appeared anxious, restricted. Congruent with reported thought/mood.   Thought process (observed):  Overinclusive, negativistic, and perseverating on frustrations, concerns about helplessness, and catastrophic fears  Thought Content: Patient did not endorse Auditory Hallucinations or Visual Hallucinations.  Pt did not endorse suicidal/ homicidal ideation, intent, plan, or urge to self-harm.  Attention/Concentration: fair.    Impulse control: fair  Insight:  partial – acknowledges existence of problem but unclear as to problematic areas and full extent of challenges  Judgment: fair as evidenced by her desire to engage in treatment today  
Psychologist met with the patient (pt) for an individual therapy session at the request of the treatment team and consent of the pt. Patient discussed issues related to current hospitalization. Patient discussed challenges with emotion regulation since last session. Patient previously shared a list of coping skills she found effective (e.g. paced breathing, walking). When asked whether patient engaged in coping skills, patient stated, “I forgot my coping skills, can you remind me?” Patient did not appear to review handouts provided to her at previous session.     Psychologist provided psychoeducation on negative thought challenging and patient engaged in thought challenging exercise. Patient was able to create new effective thoughts, reporting the exercise as helpful in reducing her anxiety.  She reported being willing to review handouts and practice skills learned in session. She reported session to be helpful. Patient welcomed psychology f/u and thanked psychologist for session. In outside communication, Psychologist provided updates to physician and IDT.      Appearance: Patient appeared their stated age, appropriately attired. She was seen in the [address] [place] [address] dining room.  Cognition and sensorium: Patient appeared awake and alert.  Memory appeared sufficient for purposes of this session.   Behavior: Patient was generally cooperative. Eye contact was intense at times, though generally fair and appropriate.   Motor: Pt was observed occasionally fidgeting.  Gait: Steady  Speech:  Volume, rate, tone of expressive speech were normal. Quality was emotional, quantity was talkative and overinclusive.   Mood: Reported as "anxious," rating it “1/10,” (10 = best). At end of session, she reported her mood had improved.  Affect: Appeared anxious, restricted. Congruent with reported thought/mood.   Thought process (observed):  Overinclusive, negativistic, and perseverating on frustrations and catastrophic fears  Thought Content: Patient did not endorse Auditory Hallucinations or Visual Hallucinations.  Pt did not endorse suicidal/ homicidal ideation, intent, plan, or urge to self-harm.  Attention/Concentration: fair. Patient appeared distracted by stimuli in day room.   Impulse control: limited. Patient interrupted psychologist on multiple occasions, walking away from session without warning to grab water or clean her glasses.  Insight:  partial – acknowledges existence of problem but unclear as to problematic areas and full extent of challenges  Judgment: fair as evidenced by her desire to engage in treatment today  
Psychologist met with the patient for an individual therapy session at the request of the treatment team and consent of the patient. Patient and psychologist continued to prepare for the patient’s upcoming discharge. The patient processed emotions associated with discharge. Psychologist provided psychoeducation on cognitive distortions. Patient engaged in cognitive restructuring exercise, reporting her mood had improved following the exercise. Patient welcomed psychology f/u and thanked psychologist for session. In outside communication, Psychologist provided updates to physician and IDT.    Appearance: Patient appeared their stated age, appropriately attired.   Cognition and sensorium: Patient appeared awake and alert.  Memory appeared sufficient for purposes of this session.   Behavior: Patient was generally calm and cooperative. Eye contact good and appropriate.   Motor: Pt was observed fidgeting (e.g. rubbing her hands) when discussing emotionally evocative topics.   Gait: Steady  Speech:  Volume, rate, tone of expressive speech were normal. Quality was slightly emotional, quantity was talkative.   Mood: Reported as "ok," rating it a “5/10” (10 = best) at outset. Following exercise, her mood improved to “8/10”  Affect: Appeared slightly anxious at outset, broader in range compared with previous sessions; improving to euthymic as session wore on. Congruent with reported thought/mood.   Thought process (observed):  Slightly negativistic; some perseveration noted- focused on catastrophic fears and negative beliefs – however, patient was able to redirect herself and challenge negative beliefs  Thought Content: Some concerns with thoughts of helplessness and catastrophic feared events. Patient did not endorse Auditory Hallucinations or Visual Hallucinations.  Pt did not endorse suicidal/ homicidal ideation, intent, plan, or urge to self-harm.  Attention/Concentration: Good.   Impulse control: good  Insight:  fair – acknowledges existence of problem but still somewhat uncertain of full extent of challenges  Judgment: fair to good as evidenced by her desire to engage in treatment today.

## 2022-04-20 NOTE — BH PSYCHOLOGY - CLINICIAN PSYCHOTHERAPY NOTE - NSTXDCOTHRGOAL_PSY_ALL_CORE
Pt will continue with ECT course, with improvement in her symptoms, and resumption of baseline functioning
Pt will comply with treatment, with a resolution of symptoms, and resumption of baseline functioning
Pt will comply with treatment, with continued improvement in symptoms, and acceptance of adequate discharge support
: Pt will comply with treatment, with a resolution of symptoms, and resumption of baseline functioning
Pt will continue with ECT course, with improvement in her symptoms, and resumption of baseline functioning
Pt will continue with ECT course, with improvement in her symptoms, and resumption of baseline functioning
Pt will comply with treatment, with continued improvement in symptoms, and acceptance of adequate discharge support
Pt will comply with treatment, with continued improvement in symptoms, and acceptance of adequate discharge support
Pt will continue with ECT course, with improvement in her symptoms, and resumption of baseline functioning
Pt will continue with ECT course, with improvement in her symptoms, and resumption of baseline functioning
: Pt will comply with treatment, with a resolution of symptoms, and resumption of baseline functioning
Pt will continue with ECT course, with improvement in her symptoms, and resumption of baseline functioning
: Pt will comply with treatment, with a resolution of symptoms, and resumption of baseline functioning

## 2022-04-20 NOTE — BH PSYCHOLOGY - CLINICIAN PSYCHOTHERAPY NOTE - TOKEN PULL-DIAGNOSIS
Primary Diagnosis:  Bipolar 2 disorder, major depressive episode [F31.81]        Problem Dx:   Hypothyroidism [E03.9]      

## 2022-04-20 NOTE — BH PSYCHOLOGY - CLINICIAN PSYCHOTHERAPY NOTE - NSTXCOPEDATEEST_PSY_ALL_CORE
12-Apr-2022
11-Mar-2022
24-Mar-2022
12-Apr-2022
12-Apr-2022
23-Mar-2022
17-Mar-2022
31-Mar-2022
24-Mar-2022
17-Mar-2022
31-Mar-2022
06-Apr-2022
06-Apr-2022

## 2022-04-20 NOTE — BH PSYCHOLOGY - GROUP THERAPY NOTE - NSBHPSYCHOLGRPNAME_PSY_A_CORE
CBT (Cognitive Behavioral Therapy) Group
Coping Skills
Coping Skills
CBT (Cognitive Behavioral Therapy) Group

## 2022-04-20 NOTE — BH PSYCHOLOGY - GROUP THERAPY NOTE - NSPSYCHOLGRPCOGPT_PSY_A_CORE
participated in exercise/shared negative coping experience/shared positive coping experience
participated in exercise/shared positive coping experience

## 2022-04-20 NOTE — BH PSYCHOLOGY - GROUP THERAPY NOTE - NSPSYCHOLGRPCOGPROB_PSY_A_CORE
anxiety/dysphoria/lack of behaviors to reduce symptoms
anxiety/dysphoria/lack of behaviors to reduce symptoms
cognitive distortions/dysphoria/lack of behaviors to reduce symptoms
anxiety/dysphoria/lack of behaviors to reduce symptoms

## 2022-04-20 NOTE — BH PSYCHOLOGY - CLINICIAN PSYCHOTHERAPY NOTE - NSTXSUICIDDATETRGT_PSY_ALL_CORE
17-Mar-2022
17-Mar-2022
07-Apr-2022
07-Apr-2022
31-Mar-2022
07-Apr-2022
31-Mar-2022
07-Apr-2022
17-Mar-2022
17-Mar-2022
07-Apr-2022

## 2022-04-20 NOTE — BH PSYCHOLOGY - CLINICIAN PSYCHOTHERAPY NOTE - NSBHPSYCHOLSERV_PSY_A_CORE
Individual psychotherapy
Family psychotherapy
Individual psychotherapy

## 2022-04-20 NOTE — BH PSYCHOLOGY - GROUP THERAPY NOTE - NSPSYCHOLGRPCOGGOAL_PSY_A_CORE
reduce reaction to psychosocial stressors/develop improved problem solving skills/learn cognitive skills to improve coping with stress
learn cognitive skills to improve coping with stress
learn cognitive skills to improve coping with stress
reduce reaction to psychosocial stressors/recognize triggers for onset of symptoms/learn cognitive skills to improve coping with stress

## 2022-04-20 NOTE — BH DISCHARGE NOTE NURSING/SOCIAL WORK/PSYCH REHAB - NSDCPRGOAL_PSY_ALL_CORE
Patient initially presented with symptoms of depression, anxiety, restlessness and suicide attempt. Patient's initial goals included increasing his hope in recovery, increasing his coping skills, decreasing her anxiety and increasing her tolerance to structured activities. Patient made gains towards her rehab goals as evidenced by patient's brighter affect, utilization of coping skills, denial of suicidal ideations and decreased anxiety. Patient also demonstrated daily medication compliance and participated daily in rehab groups.     ***Patient completed a self-rating and Press Ganey survey.

## 2022-04-20 NOTE — BH PSYCHOLOGY - GROUP THERAPY NOTE - NSPSYCHOLGRPCOGINT_PSY_A_CORE
group members provided support/group members suggested positive behaviors/mindfulness skills taught
explain the connection between health habits and stress vulnerability/group members provided support/group members suggested positive behaviors/cognitive restructuring/mindfulness skills taught/relaxation skills practiced
group members provided support/group members suggested positive behaviors/cognitive restructuring/behavioral distress tolerance skills taught/mindfulness skills taught
group members suggested positive behaviors/behavioral distress tolerance skills taught/mindfulness skills taught

## 2022-04-20 NOTE — BH PSYCHOLOGY - CLINICIAN PSYCHOTHERAPY NOTE - NSBHPSYCHOLBILLFAM_PSY_A_CORE
[id number] - 53 minutes and above
61330 - 38 to 52 minutes
22829 - 53 minutes and above
72576 - 16 to 37 minutes
64736 - 16 to 37 minutes
00895 - 53 minutes and above
73345 - 38 to 52 minutes
50564 - 16 to 37 minutes
24119 - 16 to 37 minutes
97874 - 16 to 37 minutes
00990 - Family therapy with patient present (minimum of 26 minutes)
18786 - 53 minutes and above
98374 - 16 to 37 minutes

## 2022-04-20 NOTE — BH PSYCHOLOGY - CLINICIAN PSYCHOTHERAPY NOTE - NSBHPSYCHOLPARTICIP_PSY_A_CORE
Fully engaged
Fully engaged
Partially engaged
Fully engaged
Partially engaged

## 2022-04-20 NOTE — BH PSYCHOLOGY - GROUP THERAPY NOTE - NSBHPSYCHOLRESPONSE_PSY_A_CORE
Coping skills acquired/Accepted support
Coping skills acquired/Insight displayed/Accepted support
Coping skills acquired/Accepted support
Symptoms reduced/Coping skills acquired

## 2022-04-20 NOTE — BH PSYCHOLOGY - CLINICIAN PSYCHOTHERAPY NOTE - NSTXDCOTHRPROGRES_PSY_ALL_CORE
Improving
No Change
Improving
Improving
No Change
Improving
No Change
Improving
No Change
Improving
Improving

## 2022-04-20 NOTE — BH INPATIENT PSYCHIATRY PROGRESS NOTE - NSBHASSESSSUMMFT_PSY_ALL_CORE
71-year-old woman with documented PPH of Bipolar 2 disorder (though questionable if hx is consistent with ahsan/hypomania), 2 prior suicide attempts (via OD), 2 psych admissions (1987, 1992), no h/o substance abuse, recently switched outpatient providers after her psychiatrist of 30 years (Dr. Kong) retired in late 2021, now transferred to [facility] from [facility] for management of depression after being stabilized for AMS after patient overdosed on 90 tabs of Ativan and several tabs of Lamictal and Seroquel iso 1 year of low mood, anxiety, feelings of worthlessness, hopelessness, and overwhelming loneliness. Patient has had ECT in the past, aim is to restart ECT during current hospitalization. Ms. Salazar appears to be unhappy with her new outpatient treatment team, idealizing Dr. Kong and describing her new psychiatrist and therapist as dismissive and uncaring. Ms. Salazar was tearful while sharing her story but expressed a sense of hope that with ECT she will once again feel like herself again.    Ms Salazar lives alone, she is , has no children, and is retired . She still volunteers sometimes in school and identifies her brother as her main source of support.    1. Legal: 9.27 admission  2. Safety: routine observation, pt denies SI/HI/I/P  3. Psychiatric:   -ECT Mon/Fri (2x per week to limit cognitive side effects)  -Continue Lamictal 150mg QHS, hold Lamictal the night before ECT (hold Sun and Thurs evenings)  -Continue Seroquel 200mg QHS  -Continue Ativan taper (last decreased 4/1), Ativan 0.5mg at 12PM, Ativan 0.25mg qhs  -sertraline 150mg, titrate as tolerated  -pantoprazole 6AM Mon/Fri prior to ECT to prevent nausea  4. Medical:   -Continue Synthroid 25mg daily, senna qhs, ASA 81mg daily  -Pt with elevation of TSH to 7.00 (up from 4.05) and bump in LFTs during medical hospitalization; plan to recheck with routine labs  -nicotine patch and PRN nicotine gum for cravings  -constipation, senna qhs, PRN MOM, PRN dulcolax  -monitor BP, pt with low-normal BPs, episode of symptomatic low BP during this admission    4/15: Pt completed a total of 7 ECT treatments with good tolerability and response. Sertraline has been titrated up to 200mg QD, Lamictal at 150mg QHS and Seroquel 200mg QHS. Patient has now completed her Ativan taper. Showing sustained improvement in terms of mood, showing gains in terms of anxiety. Pt has been working with psychology, applying and practicing coping skills effectively. Discharge planning in process.   4/18: No significant events reported over the weekend. pt is visible in the unit, interacting with peers. Reports stable mood, denies SIIP. Presents situational anxiety related to her discharge planning and coordinating her transition to [facility] and [facility]. Spoke with brother over the phone. F/U PPD.   4/19: Ms. Salazar is taking her medications, denies side effects. She is visible in the unit, interacting with peers. Reports stable mood, denies SIIP. Presents situational anxiety related to her discharge planning and coordinating her transition to senior care and PHP.   4/20: Ms. Salazar is taking her medications, denies side effects. She is visible in the unit, interacting with peers. Reports stable mood, denies SIIP. Presents situational anxiety related to her discharge planning and coordinating her transition to [facility] and [facility]. PPD negative. Covid swab today. Discharge projected for tomorrow to senior care with f/u at [facility].

## 2022-04-20 NOTE — BH DISCHARGE NOTE NURSING/SOCIAL WORK/PSYCH REHAB - NSDCPRRECOMMEND_PSY_ALL_CORE
Patient is scheduled to be discharged today to an assisted living facility. Writer encouraged patient to maintain medication compliance, to utilize her coping skills and to participate in structured activities.

## 2022-04-20 NOTE — BH PSYCHOLOGY - GROUP THERAPY NOTE - NSBHPSYCHOLADDL_PSY_A_CORE
In group psychotherapy session, patients were introduced to the concept of mindfulness and read from a handout describing ways of being aware of the present moment, allowing bothersome thoughts to fall away and exerting control over their thought process in the service of autonomy and expansion of their choices and options. Patients engaged in several mindfulness exercises (i.e. grounding, paced breathing, mindful stretching, body scan, noticing senses) and discussed the impact of these exercises on their thoughts, mood, and physical sensations. Patients discussed ways to apply these techniques and were encouraged to extend this in practice during their time on the unit.
In group psychotherapy session, patients continued discussion on the concept of mindfulness. Patients discussed positive/negative coping strategies and the effect of engaging in negative strategies. Patients engaged in several mindfulness exercises (i.e. paced breathing, mindful hug, gratitude, noticing one’s senses) and discussed the impact of these exercises on their thoughts, mood, and physical sensations. Patients reflected on their experience and discussed the value of incorporating this practice in their everyday lives. To aid in learning and retention of information, patients were provided handouts and were encouraged to extend this in practice during their time on the unit.
In group psychotherapy session, psychologist provided psychoeducation on fight-or-flight (i.e. sympathetic/parasympathetic nervous systems) and cognitive distortions. Facilitator and group members provided personal examples of cognitive distortions and the effects of negative thinking styles on their lives. Psychologist provided psychoeducation on distress tolerance and DBT TIP skills.  Handouts were used to extend and encourage the discussion about these methods. Patients also were taught and practiced distress tolerance exercises (TIP Skills) to aid in activating their parasympathetic nervous system. Patients reflected on their experience and discussed the value of incorporating this practice in their everyday lives. To aid in learning and retention of information, patients were provided handouts and were encouraged to extend this in practice during their time on the unit.
In group session patients discussed cognitive distortions. Facilitator provided psychoeducation on the role of the sympathetic and parasympathetic nervous systems. Facilitator provided handouts identifying typical cognitive distortions. Facilitator and group members provided personal examples of cognitive distortions and the effects of negative thinking styles on their lives. Facilitator engaged in relaxation exercise as a means of manually engaging parasympathetic nervous system.

## 2022-04-20 NOTE — BH PSYCHOLOGY - GROUP THERAPY NOTE - NSPSYCHOLGRPCOGAFCT_PSY_A_CORE FT
Affect appeared anxious, though broader in range compared to previous weeks; appropriate to the situation; congruent with reported thought.  
Affect appeared anxious, restricted in range; congruent with reported mood.
Affect appeared anxious, restricted in range; appropriate to the situation; congruent with reported mood.
Affect appeared anxious, slightly restricted in range; appropriate to the situation; congruent with reported mood.

## 2022-04-20 NOTE — BH DISCHARGE NOTE NURSING/SOCIAL WORK/PSYCH REHAB - DISCHARGE INSTRUCTIONS AFTERCARE APPOINTMENTS
In order to check the location, date, or time of your aftercare appointment, please refer to your Discharge Instructions Document given to you upon leaving the hospital.  If you have lost the instructions please call [phone]

## 2022-04-20 NOTE — BH DISCHARGE NOTE NURSING/SOCIAL WORK/PSYCH REHAB - NSDCADDINFO1FT_PSY_ALL_CORE
You will arrive at the program at 8 am staying until 1 PM the first day for your intake day.  All subsequent days Mon-Friday will be 9-1 PM.  You will remain in this program for approximately 4-5 weeks.  You will be participating in group therapy each day, with availability of individual therapy and medication management with a psychiatrist.

## 2022-04-20 NOTE — BH DISCHARGE NOTE NURSING/SOCIAL WORK/PSYCH REHAB - PATIENT PORTAL LINK FT
You can access the FollowMyHealth Patient Portal offered by [facility] by registering at the following website: [email or web address]. By joining [employer]’s FollowMyHealth portal, you will also be able to view your health information using other applications (apps) compatible with our system.

## 2022-04-20 NOTE — BH PSYCHOLOGY - CLINICIAN PSYCHOTHERAPY NOTE - NSTXDCOTHRDATETRGT_PSY_ALL_CORE
20-Apr-2022
01-Apr-2022
20-Apr-2022
01-Apr-2022
01-Apr-2022
18-Mar-2022
01-Apr-2022
01-Apr-2022
20-Apr-2022
01-Apr-2022

## 2022-04-20 NOTE — BH PSYCHOLOGY - CLINICIAN PSYCHOTHERAPY NOTE - NSBHPSYCHOLASSESSPROV_PSY_A_CORE
Licensed Staff Psychologist
Licensed Psychologist
Licensed Staff Psychologist
Licensed Psychologist and Psychology Trainee
Licensed Staff Psychologist
Licensed Psychologist
Licensed Psychologist
Licensed Staff Psychologist
Licensed Staff Psychologist
Licensed Psychologist
Licensed Staff Psychologist

## 2022-04-20 NOTE — BH INPATIENT PSYCHIATRY PROGRESS NOTE - CURRENT MEDICATION
MEDICATIONS  (STANDING):  aspirin enteric coated 81 milliGRAM(s) Oral daily  lamoTRIgine 150 milliGRAM(s) Oral at bedtime  levothyroxine 25 MICROGram(s) Oral <User Schedule>  multivitamin 1 Tablet(s) Oral daily  nicotine - 21 mG/24Hr(s) Patch 1 patch Transdermal daily  pantoprazole    Tablet 40 milliGRAM(s) Oral <User Schedule>  polyethylene glycol 3350 17 Gram(s) Oral <User Schedule>  QUEtiapine 200 milliGRAM(s) Oral at bedtime  senna 2 Tablet(s) Oral at bedtime  sertraline 200 milliGRAM(s) Oral daily    MEDICATIONS  (PRN):  aluminum hydroxide/magnesium hydroxide/simethicone Suspension 30 milliLiter(s) Oral every 4 hours PRN Dyspepsia  artificial tears (preservative free) Ophthalmic Solution 1 Drop(s) Left EYE four times a day PRN dry eyes  bisacodyl Suppository 10 milliGRAM(s) Rectal daily PRN constipation  docosanol 10% Cream 1 Application(s) Topical every 6 hours PRN cold sore  magnesium hydroxide Suspension 30 milliLiter(s) Oral <User Schedule> PRN Constipation  nicotine  Polacrilex Gum 2 milliGRAM(s) Oral every 2 hours PRN nicotine craving  sodium chloride 0.65% Nasal 1 Spray(s) Both Nostrils once PRN congestion

## 2022-04-20 NOTE — BH PSYCHOLOGY - GROUP THERAPY NOTE - TOKEN PULL-DIAGNOSIS
Primary Diagnosis:  Bipolar 2 disorder, major depressive episode [F31.81]        Problem Dx:   Hypothyroidism [E03.9]      
Primary Diagnosis:  Bipolar 2 disorder, major depressive episode [F31.81]        Problem Dx:   Hypothyroidism [E03.9]

## 2022-04-20 NOTE — BH PSYCHOLOGY - CLINICIAN PSYCHOTHERAPY NOTE - NSTXCOPEDATETRGT_PSY_ALL_CORE
18-Mar-2022
24-Mar-2022
19-Apr-2022
30-Mar-2022
19-Apr-2022
07-Apr-2022
31-Mar-2022
19-Apr-2022
31-Mar-2022
07-Apr-2022
24-Mar-2022
13-Apr-2022
13-Apr-2022

## 2022-04-20 NOTE — BH PSYCHOLOGY - CLINICIAN PSYCHOTHERAPY NOTE - NSTXSUICIDDATEEST_PSY_ALL_CORE
10-Mar-2022
10-Mar-2022
24-Mar-2022
31-Mar-2022
10-Mar-2022
24-Mar-2022
31-Mar-2022
10-Mar-2022
31-Mar-2022
31-Mar-2022

## 2022-04-20 NOTE — BH PSYCHOLOGY - CLINICIAN PSYCHOTHERAPY NOTE - NSBHPSYCHOLRESPONSE_PSY_A_CORE
Symptoms reduced/Coping skills acquired/Accepted support
Symptoms reduced/Coping skills acquired/Accepted support
Coping skills acquired/Accepted support
Coping skills acquired/Accepted support
Symptoms reduced/Coping skills acquired/Accepted support
Symptoms reduced/Coping skills acquired/Accepted support
Coping skills acquired/Accepted support
Symptoms reduced/Coping skills acquired/Insight displayed/Accepted support
Coping skills acquired/Accepted support
Insight displayed/Accepted support
Symptoms reduced/Coping skills acquired/Accepted support
Symptoms reduced/Coping skills acquired/Accepted support

## 2022-04-20 NOTE — BH PSYCHOLOGY - CLINICIAN PSYCHOTHERAPY NOTE - NSBHPSYCHOLINT_PSY_A_CORE
Cognitive/behavioral therapy/Dialectical  Behavioral Therapy (DBT)/Supported coping skills/Supportive therapy
Cognitive/behavioral therapy/Dialectical  Behavioral Therapy (DBT)/Supported coping skills/Supportive therapy/Treatment compliance encouraged
Supported coping skills/Supportive therapy
Cognitive/behavioral therapy/Dialectical  Behavioral Therapy (DBT)/Supported coping skills/Supportive therapy
Dialectical  Behavioral Therapy (DBT)/Supported coping skills/Supportive therapy/Treatment compliance encouraged
Cognitive/behavioral therapy/Supported coping skills/Supportive therapy/Treatment compliance encouraged
Cognitive/behavioral therapy/Dialectical  Behavioral Therapy (DBT)/Supported coping skills/Supportive therapy
Cognitive/behavioral therapy/Encourage medication compliance/Supportive therapy/Treatment compliance encouraged
Cognitive/behavioral therapy/Supported coping skills/Supportive therapy
Cognitive/behavioral therapy/Dialectical  Behavioral Therapy (DBT)/Supported coping skills/Supportive therapy/Treatment compliance encouraged

## 2022-04-20 NOTE — BH PSYCHOLOGY - CLINICIAN PSYCHOTHERAPY NOTE - NSBHPTASSESSDT_PSY_A_CORE
23-Mar-2022 01:15
14-Mar-2022 09:00
20-Apr-2022 14:20
06-Apr-2022 14:30
28-Mar-2022 16:30
18-Apr-2022 09:15
29-Mar-2022 10:55
01-Apr-2022 14:02
07-Apr-2022 14:00
17-Mar-2022 09:32
04-Apr-2022 11:30
13-Apr-2022 10:00
21-Mar-2022 13:20

## 2022-04-20 NOTE — BH DISCHARGE NOTE NURSING/SOCIAL WORK/PSYCH REHAB - NSDCPEFALRISK_GEN_ALL_CORE
For information on Fall & Injury Prevention, visit: [email or web address] OR  [email or web address] OR  https://www.cdc.gov/steadi/patient.html

## 2022-04-20 NOTE — BH PSYCHOLOGY - CLINICIAN PSYCHOTHERAPY NOTE - NSTXSUICIDPROGRES_PSY_ALL_CORE
No Change
Improving
No Change
Improving
No Change
Improving
No Change

## 2022-04-20 NOTE — BH INPATIENT PSYCHIATRY PROGRESS NOTE - NSBHCHARTREVIEWVS_PSY_A_CORE FT
Vital Signs Last 24 Hrs  T(C): 36.7 (04-20-22 @ 08:47), Max: 36.7 (04-20-22 @ 08:47)  T(F): 98 (04-20-22 @ 08:47), Max: 98 (04-20-22 @ 08:47)  HR: --  BP: --  BP(mean): --  RR: --  SpO2: --    Orthostatic VS  04-20-22 @ 08:47  Lying BP: --/-- HR: --  Sitting BP: 101/60 HR: 70  Standing BP: 90/60 HR: 81  Site: --  Mode: --  Orthostatic VS  04-19-22 @ 07:38  Lying BP: --/-- HR: --  Sitting BP: 104/71 HR: 82  Standing BP: --/-- HR: --  Site: upper left arm  Mode: electronic

## 2022-04-20 NOTE — BH INPATIENT PSYCHIATRY PROGRESS NOTE - NSBHFUPINTERVALHXFT_PSY_A_CORE
Patient seen for follow-up for anxiety, depression and recent overdose. No significant events reported overnight. Ms. Salazar is taking her medications, denies side effects. She is visible in the unit, interacting with peers. Reports stable mood, denies SIIP. Presents situational anxiety related to her discharge planning and coordinating her transition to [facility] and PHP. PPD negative. Covid swab today. Discharge projected for tomorrow to [place] with f/u at [facility].

## 2022-04-20 NOTE — BH PSYCHOLOGY - CLINICIAN PSYCHOTHERAPY NOTE - NSBHPSYCHOLPROBS_PSY_ALL_CORE
Anxiety/Depression
Depression
Depression
Depression/Self Injurious Behavior
Self Injurious Behavior/Other...
Self Injurious Behavior/Other...
Depression
Anxiety/Depression
Other...
Anxiety/Depression/Self Injurious Behavior
Anxiety/Disorganization
Depression/Suicidality
Anxiety/Depression

## 2022-04-20 NOTE — BH DISCHARGE NOTE NURSING/SOCIAL WORK/PSYCH REHAB - NSCDUDCCRISIS_PSY_A_CORE
[facility]  1 [phone]) [facility]-WELL ([phone])  Text "WELL" to 82016  Website: www.Oodrive/.[employer] [facility] 1 (218) 481-WXJR (5359) Website: www.safehorizon.org/.National Suicide Prevention Lifeline 6 (136) 765-5449/.  [employer]  1 (236) LIFENET (559-1142)/.  [facility]  [phone] [place] [address] [phone]   Hours:  Monday through Friday from 9 AM to 3 PM/.  [place] [employer] of  Affairs - Veterans Crisis Line  [phone], Option 1

## 2022-04-20 NOTE — BH PSYCHOLOGY - GROUP THERAPY NOTE - NSPSYCHOLGRPBILLING_PSY_A_CORE
79096 - Group Psychotherapy
07113 - Group Psychotherapy
97050 - Group Psychotherapy
[id number] - Group Psychotherapy

## 2022-04-20 NOTE — BH PSYCHOLOGY - CLINICIAN PSYCHOTHERAPY NOTE - NSBHPSYCHOLPARTICIPCOMMENT_PSY_A_CORE FT
Patient appeared engaged and attentive to material.  Their responses were relevant to the topic, meaningful in content, and the patient displayed vulnerability. 
Patient appeared engaged and attentive to material.  Patient engaged in exercises.  Their responses were relevant to the topic, meaningful in content, and the patient displayed vulnerability. 
Patient appeared engaged and attentive to material.  Patient engaged in exercise.  Their responses were relevant to the topic, meaningful in content, and the patient displayed vulnerability. 
Patient appeared engaged and attentive to material.  Their responses were relevant to the topic, meaningful in content, and the patient displayed vulnerability. 
Patient appeared engaged and attentive to material.  Their responses were relevant to the topic, meaningful in content, and the patient displayed vulnerability. 
Patient appeared engaged and attentive to material.  Patient engaged in exercises.  Their responses were relevant to the topic, meaningful in content, and the patient displayed vulnerability. 
Patient appeared  partially engaged and attentive to material.  Patient engaged in exercise.  Their responses were relevant to the topic, meaningful in content, and the patient displayed vulnerability. Some moments of distractibility were observed
Patient appeared engaged and attentive to material.  Patient engaged in exercise.  Their responses were relevant to the topic and meaningful in content. 
Patient appeared engaged and attentive to material.  Patient engaged in exercises.  Their responses were relevant to the topic, meaningful in content, and the patient displayed vulnerability. 
Patient appeared engaged and attentive to material. Their responses were relevant to the topic, meaningful in content, and the patient displayed vulnerability. Distractibility noted, at times.
Patient appeared engaged and attentive to material.  Patient engaged in exercise.  Their responses were relevant to the topic, meaningful in content, and the patient displayed vulnerability. 
Patient appeared engaged and attentive to material.  Patient engaged in exercise.  Their responses were relevant to the topic, meaningful in content, and the patient displayed vulnerability.

## 2022-04-20 NOTE — BH PSYCHOLOGY - GROUP THERAPY NOTE - NSPSYCHOLGRPCOGSPCH_PSY_A_CORE FT
Rate, intelligibility, volume, and quality,  of expressive speech were normal.
Rate, intelligibility, volume, and quality of expressive speech were normal.

## 2022-04-20 NOTE — BH PSYCHOLOGY - GROUP THERAPY NOTE - NSBHPSYCHOLPARTICIPCOMMENT_PSY_A_CORE FT
Patient appeared fully engaged and attentive as evidenced by good eye contact, head nodding and body language, participation in group exercises, and responses to questions posed by the facilitator.    
Patient participated in group, appearing engaged and attentive to material.  Patient engaged in exercise.  Their responses were relevant to the topic, meaningful in content, and the patient displayed vulnerability. The patient offered support to  and received support from other members.
Patient participated in group, appearing engaged and attentive to material.  Patient engaged in exercise.  Their responses were relevant to the topic, meaningful in content, and the patient displayed vulnerability. 
Patient participated in group, appearing engaged and attentive to material.  Patient engaged in exercises.  Their responses were relevant to the topic, meaningful in content, and the patient displayed vulnerability.

## 2022-04-20 NOTE — BH PSYCHOLOGY - CLINICIAN PSYCHOTHERAPY NOTE - NSBHPSYCHOLDURATION_PSY_A_CORE
45 minutes
60 minutes
30 minutes
30 minutes
60 minutes
60 minutes
45 minutes
60 minutes
30 minutes
45 minutes
other...
20 minutes
30 minutes

## 2022-04-20 NOTE — BH PSYCHOLOGY - CLINICIAN PSYCHOTHERAPY NOTE - NSTXCOPEPROGRES_PSY_ALL_CORE
Improving
No Change
Improving
No Change
Improving
Improving

## 2022-04-20 NOTE — BH PSYCHOLOGY - CLINICIAN PSYCHOTHERAPY NOTE - NSTXSUICIDGOAL_PSY_ALL_CORE
Be able to state 3 reasons for living

## 2022-04-21 VITALS — TEMPERATURE: 97 F

## 2022-04-21 PROCEDURE — 99238 HOSP IP/OBS DSCHRG MGMT 30/<: CPT

## 2022-04-21 RX ADMIN — Medication 25 MICROGRAM(S): at 06:14

## 2022-04-21 RX ADMIN — Medication 1 PATCH: at 06:36

## 2022-04-21 RX ADMIN — Medication 81 MILLIGRAM(S): at 08:35

## 2022-04-21 RX ADMIN — SERTRALINE 200 MILLIGRAM(S): 25 TABLET, FILM COATED ORAL at 08:35

## 2022-04-21 RX ADMIN — Medication 1 PATCH: at 08:35

## 2022-04-21 RX ADMIN — Medication 1 TABLET(S): at 08:35

## 2022-04-21 NOTE — BH INPATIENT PSYCHIATRY PROGRESS NOTE - NSBHFUPINTERVALHXFT_PSY_A_CORE
Patient seen in preparation for discharge this morning. Pt was in good spirits, eager to move forward. Expressed some "jitteriness" leaving the hospital since she has been here for a long time. Validated and normalized this feeling and provided reassurance. Patient reports sustained gains in terms of depression and anxiety. Denies SIIP and is future oriented. Although the patient remains at their chronic baseline risk of self-harm, such risk cannot be further ameliorated by continued inpatient treatment and the patient is therefore appropriate for discharge. Ms. Salazar has made clinically meaningful progress during this hospitalization and has clearly benefited from medications. On day of discharge, the patient has improved significantly and no longer requires inpatient treatment and care. Pt will be discharged to [facility] and follow-up with outpatient care in [facility].

## 2022-04-21 NOTE — BH INPATIENT PSYCHIATRY PROGRESS NOTE - NSTXSUICIDDATEEST_PSY_ALL_CORE
10-Mar-2022
31-Mar-2022
10-Mar-2022
31-Mar-2022
10-Mar-2022
10-Mar-2022
31-Mar-2022
31-Mar-2022
10-Mar-2022
31-Mar-2022
10-Mar-2022
31-Mar-2022
10-Mar-2022
31-Mar-2022
31-Mar-2022
10-Mar-2022
24-Mar-2022
31-Mar-2022
10-Mar-2022
24-Mar-2022
24-Mar-2022
31-Mar-2022
10-Mar-2022
24-Mar-2022
10-Mar-2022
31-Mar-2022
10-Mar-2022
31-Mar-2022
24-Mar-2022
10-Mar-2022
10-Mar-2022

## 2022-04-21 NOTE — BH INPATIENT PSYCHIATRY PROGRESS NOTE - NSBHFUPINTERVALCCFT_PSY_A_CORE
"I'm okay, I'm trying to do my homework."
Im constipated still
"I feel so guilty about trying to kill myself, it's against my [employer]."
"I'm looking forward to my two ECT treatments this week."
"I'm so anxious about my tranquilizer"
Im constipated, I was fine with less Ativan
"I think the ECT treatment is too strong."
ECT went ruthie
"I still worry that after I go home I'll get depressed again and try to kill myself"
"I'm nervous about going back home."
"I feel ok"
"I'm really upset by my new roommate and I can't get over how long it's taking to set up ECT."
I would like to speak to the psychologist
"I feel ok"
"I'm having a panic attack"
"I felt hopeful yesterday but this morning I feel bad again."
"I'm having a panic attack"
"I'm having a panic attack"
"I'm still feeling anxious about waiting for ECT."
"I'm ok!"
"I'm nervous about going back to my home where I tried to kill myself."
"I feel like my life is over"
"I think I've lost the will to live. Will this feeling ever go away?"
"I've realized that all my feelings are fear-based."
"I feel ok"
"I feel ok"
"I felt hopeful yesterday but this morning I feel bad again."
"I really didn't think I need to be here, it is making me worse"
"Im ok"
"I feel foggy, I can't remember why I'm here."
"I'm a very anxious person"
"I feel ok"
"I feel ok"
"I'm ok with starting ECT today, Im more worried about what will happen when I go home"
"I'm nervous at the thought of going home, is that normal?"
"I'm having a panic attack"

## 2022-04-21 NOTE — BH INPATIENT PSYCHIATRY PROGRESS NOTE - NSTXDCOTHRDATEEST_PSY_ALL_CORE
25-Mar-2022
13-Apr-2022
25-Mar-2022
13-Apr-2022
13-Apr-2022
17-Mar-2022
08-Apr-2022
25-Mar-2022
17-Mar-2022
08-Apr-2022
17-Mar-2022
25-Mar-2022
13-Apr-2022
13-Apr-2022
25-Mar-2022
17-Mar-2022
17-Mar-2022
11-Mar-2022
08-Apr-2022
13-Apr-2022
11-Mar-2022
25-Mar-2022
11-Mar-2022
13-Apr-2022
25-Mar-2022
17-Mar-2022
11-Mar-2022
25-Mar-2022
25-Mar-2022
17-Mar-2022
25-Mar-2022
17-Mar-2022
25-Mar-2022
17-Mar-2022
11-Mar-2022

## 2022-04-21 NOTE — BH INPATIENT PSYCHIATRY PROGRESS NOTE - NSTXCOPEPROGRES_PSY_ALL_CORE
Improving
Improving
No Change
Improving
No Change
Improving
No Change
Improving
No Change
Improving
No Change
Improving
No Change
Improving
No Change

## 2022-04-21 NOTE — BH INPATIENT PSYCHIATRY PROGRESS NOTE - NSTXPROBDCOTHR_PSY_ALL_CORE
DISCHARGE ISSUE - OTHER

## 2022-04-21 NOTE — BH INPATIENT PSYCHIATRY PROGRESS NOTE - NSDCCRITERIA_PSY_ALL_CORE
Improvement in depressive sx, remission of suicidal thoughts. 

## 2022-04-21 NOTE — BH INPATIENT PSYCHIATRY PROGRESS NOTE - NSTXSUICIDDATETRGT_PSY_ALL_CORE
07-Apr-2022
17-Mar-2022
17-Mar-2022
07-Apr-2022
17-Mar-2022
07-Apr-2022
17-Mar-2022
07-Apr-2022
07-Apr-2022
17-Mar-2022
07-Apr-2022
17-Mar-2022
07-Apr-2022
17-Mar-2022
17-Mar-2022
31-Mar-2022
07-Apr-2022
07-Apr-2022
17-Mar-2022
31-Mar-2022
07-Apr-2022
17-Mar-2022
31-Mar-2022
17-Mar-2022
17-Mar-2022
07-Apr-2022
17-Mar-2022
31-Mar-2022
31-Mar-2022
17-Mar-2022

## 2022-04-21 NOTE — BH INPATIENT PSYCHIATRY PROGRESS NOTE - NSTXCOPEDATEEST_PSY_ALL_CORE
12-Apr-2022
31-Mar-2022
24-Mar-2022
31-Mar-2022
12-Apr-2022
24-Mar-2022
12-Apr-2022
11-Mar-2022
31-Mar-2022
11-Mar-2022
17-Mar-2022
06-Apr-2022
17-Mar-2022
17-Mar-2022
11-Mar-2022
24-Mar-2022
30-Mar-2022
11-Mar-2022
23-Mar-2022
12-Apr-2022
12-Apr-2022
21-Apr-2022
06-Apr-2022
12-Apr-2022
31-Mar-2022
31-Mar-2022
24-Mar-2022
31-Mar-2022
06-Apr-2022
06-Apr-2022
17-Mar-2022
11-Mar-2022
17-Mar-2022
11-Mar-2022

## 2022-04-21 NOTE — BH INPATIENT PSYCHIATRY PROGRESS NOTE - NSTXDCOTHRPROGRES_PSY_ALL_CORE
Improving
No Change
Improving
No Change
Improving
No Change
Improving
Improving
No Change
Improving
No Change
Improving
No Change

## 2022-04-21 NOTE — BH INPATIENT PSYCHIATRY PROGRESS NOTE - NSBHCHARTREVIEWINVESTIGATE_PSY_A_CORE FT
ECG (Mar 07 2022)  Ventricular Rate 63 BPM  Atrial Rate 63 BPM  P-R Interval 162 ms  QRS Duration 82 ms  Q-T Interval 404 ms  QTC Calculation(Bazett) 413 ms  P Axis 40 degrees  R Axis 16 degrees  T Axis 27 degrees  Diagnosis Line NORMAL SINUS RHYTHM  NORMAL ECG

## 2022-04-21 NOTE — BH INPATIENT PSYCHIATRY PROGRESS NOTE - NSTXPROBCOPE_PSY_ALL_CORE
COPING, INEFFECTIVE

## 2022-04-21 NOTE — BH INPATIENT PSYCHIATRY PROGRESS NOTE - NSBHCHARTREVIEWVS_PSY_A_CORE FT
Vital Signs Last 24 Hrs  T(C): 36.1 (04-21-22 @ 08:22), Max: 36.1 (04-21-22 @ 08:22)  T(F): 97 (04-21-22 @ 08:22), Max: 97 (04-21-22 @ 08:22)  HR: --  BP: --  BP(mean): --  RR: --  SpO2: --    Orthostatic VS  04-21-22 @ 08:22  Lying BP: --/-- HR: --  Sitting BP: 112/68 HR: 76  Standing BP: 90/65 HR: 80  Site: --  Mode: --  Orthostatic VS  04-20-22 @ 08:47  Lying BP: --/-- HR: --  Sitting BP: 101/60 HR: 70  Standing BP: 90/60 HR: 81  Site: --  Mode: --

## 2022-04-21 NOTE — BH INPATIENT PSYCHIATRY PROGRESS NOTE - NSTXCOPEDATETRGT_PSY_ALL_CORE
19-Apr-2022
30-Mar-2022
24-Mar-2022
24-Mar-2022
13-Apr-2022
24-Mar-2022
31-Mar-2022
07-Apr-2022
21-Apr-2022
24-Mar-2022
18-Mar-2022
13-Apr-2022
24-Mar-2022
07-Apr-2022
19-Apr-2022
24-Mar-2022
24-Mar-2022
06-Apr-2022
18-Mar-2022
07-Apr-2022
07-Apr-2022
18-Mar-2022
19-Apr-2022
13-Apr-2022
07-Apr-2022
13-Apr-2022
18-Mar-2022
19-Apr-2022
19-Apr-2022
07-Apr-2022
31-Mar-2022
19-Apr-2022
18-Mar-2022
18-Mar-2022

## 2022-04-21 NOTE — BH INPATIENT PSYCHIATRY PROGRESS NOTE - NSBHANTIPSYCHOTIC_PSY_ALL_CORE
Yes...

## 2022-04-21 NOTE — BH INPATIENT PSYCHIATRY PROGRESS NOTE - NSTXPROBSUICID_PSY_ALL_CORE
Called pt with negative Syphillus, HIV, Chlamydia, Herpes results.  
SUICIDE/SELF-INJURIOUS BEHAVIOR

## 2022-04-21 NOTE — BH INPATIENT PSYCHIATRY PROGRESS NOTE - NSTXDCOTHRGOAL_PSY_ALL_CORE
: Pt will comply with treatment, with a resolution of symptoms, and resumption of baseline functioning
: Pt will comply with treatment, with a resolution of symptoms, and resumption of baseline functioning
Pt will continue with ECT course, with improvement in her symptoms, and resumption of baseline functioning
Pt will continue with ECT course, with improvement in her symptoms, and resumption of baseline functioning
Pt will comply with treatment, with a resolution of symptoms, and resumption of baseline functioning
Pt will comply with treatment, with continued improvement in symptoms, and acceptance of adequate discharge support
: Pt will comply with treatment, with a resolution of symptoms, and resumption of baseline functioning
Pt will continue with ECT course, with a resolution of SI, and acceptance of adequate psychiatric follow up.
Pt will continue with ECT course, with improvement in her symptoms, and resumption of baseline functioning
: Pt will comply with treatment, with a resolution of symptoms, and resumption of baseline functioning
Pt will comply with treatment, with continued improvement in symptoms, and acceptance of adequate discharge support
Pt will continue with ECT course, with improvement in her symptoms, and resumption of baseline functioning
Pt will comply with treatment, with a resolution of symptoms, and resumption of baseline functioning
: Pt will comply with treatment, with a resolution of symptoms, and resumption of baseline functioning
Pt will continue with ECT course, with improvement in her symptoms, and resumption of baseline functioning
Pt will comply with treatment, with continued improvement in symptoms, and acceptance of adequate discharge support
Pt will comply with treatment, with a resolution of symptoms, and resumption of baseline functioning
Pt will comply with treatment, with continued improvement in symptoms, and acceptance of adequate discharge support
Pt will continue with ECT course, with a resolution of SI, and acceptance of adequate psychiatric follow up.
Pt will continue with ECT course, with a resolution of SI, and acceptance of adequate psychiatric follow up.
Pt will continue with ECT course, with improvement in her symptoms, and resumption of baseline functioning
: Pt will comply with treatment, with a resolution of symptoms, and resumption of baseline functioning
Pt will comply with treatment, with continued improvement in symptoms, and acceptance of adequate discharge support
Pt will continue with ECT course, with improvement in her symptoms, and resumption of baseline functioning
Pt will comply with treatment, with a resolution of symptoms, and resumption of baseline functioning
: Pt will comply with treatment, with a resolution of symptoms, and resumption of baseline functioning
Pt will continue with ECT course, with improvement in her symptoms, and resumption of baseline functioning
: Pt will comply with treatment, with a resolution of symptoms, and resumption of baseline functioning
: Pt will comply with treatment, with a resolution of symptoms, and resumption of baseline functioning
Pt will comply with treatment, with a resolution of symptoms, and resumption of baseline functioning

## 2022-04-21 NOTE — BH INPATIENT PSYCHIATRY PROGRESS NOTE - NSBHASSESSSUMMFT_PSY_ALL_CORE
71-year-old woman with documented PPH of Bipolar 2 disorder (though questionable if hx is consistent with ahsan/hypomania), 2 prior suicide attempts (via OD), 2 psych admissions (1987, 1992), no h/o substance abuse, recently switched outpatient providers after her psychiatrist of 30 years (Dr. Kong) retired in late 2021, now transferred to [facility] from [facility] for management of depression after being stabilized for AMS after patient overdosed on 90 tabs of Ativan and several tabs of Lamictal and Seroquel iso 1 year of low mood, anxiety, feelings of worthlessness, hopelessness, and overwhelming loneliness. Patient has had ECT in the past, aim is to restart ECT during current hospitalization. Ms. Salazar appears to be unhappy with her new outpatient treatment team, idealizing Dr. Kong and describing her new psychiatrist and therapist as dismissive and uncaring. Ms. Salazar was tearful while sharing her story but expressed a sense of hope that with ECT she will once again feel like herself again.    Ms Salazar lives alone, she is , has no children, and is retired . She still volunteers sometimes in school and identifies her brother as her main source of support.    1. Legal: 9.27 admission  2. Safety: routine observation, pt denies SI/HI/I/P  3. Psychiatric:   -ECT Mon/Fri (2x per week to limit cognitive side effects)  -Continue Lamictal 150mg QHS, hold Lamictal the night before ECT (hold Sun and Thurs evenings)  -Continue Seroquel 200mg QHS  -Continue Ativan taper (last decreased 4/1), Ativan 0.5mg at 12PM, Ativan 0.25mg qhs  -sertraline 150mg, titrate as tolerated  -pantoprazole 6AM Mon/Fri prior to ECT to prevent nausea  4. Medical:   -Continue Synthroid 25mg daily, senna qhs, ASA 81mg daily  -Pt with elevation of TSH to 7.00 (up from 4.05) and bump in LFTs during medical hospitalization; plan to recheck with routine labs  -nicotine patch and PRN nicotine gum for cravings  -constipation, senna qhs, PRN MOM, PRN dulcolax  -monitor BP, pt with low-normal BPs, episode of symptomatic low BP during this admission    4/21: Patient seen in preparation for discharge this morning. Pt was in good spirits, eager to move forward. Expressed some "jitteriness" leaving the hospital since she has been here for a long time. Validated and normalized this feeling and provided reassurance. Patient reports sustained gains in terms of depression and anxiety. Denies SIIP and is future oriented. Although the patient remains at their chronic baseline risk of self-harm, such risk cannot be further ameliorated by continued inpatient treatment and the patient is therefore appropriate for discharge. Ms. Salazar has made clinically meaningful progress during this hospitalization and has clearly benefited from medications. On day of discharge, the patient has improved significantly and no longer requires inpatient treatment and care. Pt will be discharged to [facility] and follow-up with outpatient care in [facility].

## 2022-04-21 NOTE — BH INPATIENT PSYCHIATRY PROGRESS NOTE - NSBHCONTPROVIDER_PSY_ALL_CORE
Yes...
No, attempted...
Yes...

## 2022-04-21 NOTE — BH INPATIENT PSYCHIATRY PROGRESS NOTE - NSBHCHARTREVIEWLAB_PSY_A_CORE FT
13.6   5.52  )-----------( 240      ( 03 Mar 2022 11:23 )             38.5     03-03    135  |  102  |  17  ----------------------------<  102<H>  3.6   |  19<L>  |  0.56    Ca    9.2      03 Mar 2022 11:23    TPro  6.4  /  Alb  3.9  /  TBili  1.1  /  DBili  x   /  AST  34  /  ALT  17  /  AlkPhos  72  03-02    Urinalysis Basic - ( 02 Mar 2022 22:02 )    Color: Yellow / Appearance: Clear / S.032 / pH: x  Gluc: x / Ketone: Large  / Bili: Small / Urobili: 2 mg/dL   Blood: x / Protein: 30 mg/dL / Nitrite: Negative   Leuk Esterase: Negative / RBC: 4 /hpf / WBC 3 /HPF   Sq Epi: x / Non Sq Epi: 1 /hpf / Bacteria: Negative    
TSH 7.00 (Mar 05 2022)  TSH 4.05 (Mar 03 2022)    AST/ALT 59/83 (Mar 11 2022)
                      13.6   5.52  )-----------( 240      ( 03 Mar 2022 11:23 )             38.5     03-03    135  |  102  |  17  ----------------------------<  102<H>  3.6   |  19<L>  |  0.56    Ca    9.2      03 Mar 2022 11:23    TPro  6.4  /  Alb  3.9  /  TBili  1.1  /  DBili  x   /  AST  34  /  ALT  17  /  AlkPhos  72  03-02    Urinalysis Basic - ( 02 Mar 2022 22:02 )    Color: Yellow / Appearance: Clear / S.032 / pH: x  Gluc: x / Ketone: Large  / Bili: Small / Urobili: 2 mg/dL   Blood: x / Protein: 30 mg/dL / Nitrite: Negative   Leuk Esterase: Negative / RBC: 4 /hpf / WBC 3 /HPF   Sq Epi: x / Non Sq Epi: 1 /hpf / Bacteria: Negative    
TSH 7.00 (Mar 05 2022)  TSH 4.05 (Mar 03 2022)    AST/ALT 59/83 (Mar 11 2022)
                      13.6   5.52  )-----------( 240      ( 03 Mar 2022 11:23 )             38.5     03-03    135  |  102  |  17  ----------------------------<  102<H>  3.6   |  19<L>  |  0.56    Ca    9.2      03 Mar 2022 11:23    TPro  6.4  /  Alb  3.9  /  TBili  1.1  /  DBili  x   /  AST  34  /  ALT  17  /  AlkPhos  72  03-02    Urinalysis Basic - ( 02 Mar 2022 22:02 )    Color: Yellow / Appearance: Clear / S.032 / pH: x  Gluc: x / Ketone: Large  / Bili: Small / Urobili: 2 mg/dL   Blood: x / Protein: 30 mg/dL / Nitrite: Negative   Leuk Esterase: Negative / RBC: 4 /hpf / WBC 3 /HPF   Sq Epi: x / Non Sq Epi: 1 /hpf / Bacteria: Negative    
TSH 7.00 (Mar 05 2022)  TSH 4.05 (Mar 03 2022)  AST/ALT 59/83 (Mar 11 2022)

## 2022-04-21 NOTE — BH INPATIENT PSYCHIATRY PROGRESS NOTE - NSBHCONSBHPROVDETAILS_PSY_A_CORE  FT
matt w/Dr. Kong, retired in late 2021; patient was transferred to Dr. Car
former psychiatrist Dr. Kong retired in late 2021, left message for return call

## 2022-04-21 NOTE — BH INPATIENT PSYCHIATRY PROGRESS NOTE - NSBHMETABOLICLABS_PSY_ALL_CORE
Patient is a 89 year old female complaining of inability to ambulate x2 weeks. Arrived by EMS from home. Patient has history of HTN, CHF, paroxysmal afib. Patient is A&O x 4 and appears well. Pt reports she was seen about 14 hours ago in ED s/p mechanical fall, has negative workup for fractures or ICH and DC home. She is returning to ED with her family this morning stating they are unable to manage her care at home due to her new difficulty walking. Family is requesting patient stay in hospital until they find placement at a long term care facility. Endorsing no complaints at this time in ED. Denies complaints of chest pain, sob, fevers, chills, n/v/d, headache, syncope, burning urination, blood in urine, blood in stool. Pt does have prolapsed hemorrhoids at rectum with small amount of superficial bleeding, states this issue is chronic for many years. Abd is firm, non tender, grossly distended. Skin is warm and dry. Color is consistent with ethnicity. VSS/ NAD. Safety and comfort maintained. Pt undressed in [facility] fall risk hospital gown, red non slip socks on feet, stretcher in lowest position with wheels locked, side rails up. Daughter and son at the bedside. Will continue to monitor.
Labs within last 12 months

## 2022-04-21 NOTE — BH INPATIENT PSYCHIATRY PROGRESS NOTE - NSTXSUICIDPROGRES_PSY_ALL_CORE
Improving
Improving
No Change
Improving
Improving
No Change
No Change
Improving
No Change
Improving
No Change
Improving
No Change
Improving
No Change
Improving
No Change
No Change
Improving
Improving
No Change
No Change
Improving
No Change

## 2022-04-21 NOTE — BH INPATIENT PSYCHIATRY PROGRESS NOTE - NSICDXBHSECONDARYDX_PSY_ALL_CORE
Hypothyroidism   E03.9  

## 2022-04-21 NOTE — BH INPATIENT PSYCHIATRY PROGRESS NOTE - NSTXCOPEGOAL_PSY_ALL_CORE
For information on Fall & Injury Prevention, visit [email or web address]
Identify and utilize 2 coping skills that meet their needs

## 2022-04-21 NOTE — BH INPATIENT PSYCHIATRY PROGRESS NOTE - NSTXDCOTHRDATETRGT_PSY_ALL_CORE
01-Apr-2022
15-Apr-2022
01-Apr-2022
20-Apr-2022
01-Apr-2022
18-Mar-2022
20-Apr-2022
15-Apr-2022
18-Mar-2022
01-Apr-2022
20-Apr-2022
01-Apr-2022
15-Apr-2022
20-Apr-2022
01-Apr-2022
20-Apr-2022
01-Apr-2022
20-Apr-2022
18-Mar-2022
20-Apr-2022
01-Apr-2022
18-Mar-2022
01-Apr-2022
18-Mar-2022

## 2022-04-21 NOTE — BH INPATIENT PSYCHIATRY PROGRESS NOTE - NSBHINPTBILLING_PSY_ALL_CORE
16968 - Inpatient Moderate Complexity
61900 - Inpatient Moderate Complexity
75818 - Inpatient Moderate Complexity
03551 - Inpatient Moderate Complexity
55917 - Inpatient Moderate Complexity
62323 - Inpatient Moderate Complexity
38446 - Inpatient Low Complexity
69049 - Inpatient Moderate Complexity
91196 - Inpatient Moderate Complexity
05486 - Inpatient Moderate Complexity
85732 - Inpatient Moderate Complexity
68017 - Inpatient Low Complexity
25787 - Inpatient Moderate Complexity
35381 - Inpatient Moderate Complexity
50582 - Inpatient Moderate Complexity
09601 - Inpatient Moderate Complexity
25776 - Inpatient Low Complexity
81966 - Inpatient Moderate Complexity
06416 - Inpatient Moderate Complexity
49148 - Hospital Discharge Day Management; 30 min or less
17674 - Inpatient Moderate Complexity
97824 - Inpatient Moderate Complexity
05804 - Inpatient Low Complexity
58559 - Inpatient Moderate Complexity
90922 - Inpatient Moderate Complexity
05194 - Inpatient Moderate Complexity
56761 - Inpatient Low Complexity
94502 - Inpatient Moderate Complexity
14017 - Inpatient Moderate Complexity
95249 - Inpatient Moderate Complexity
34019 - Inpatient Moderate Complexity
55215 - Inpatient Moderate Complexity
13405 - Inpatient Moderate Complexity
38824 - Inpatient Moderate Complexity
81771 - Inpatient Low Complexity

## 2022-04-21 NOTE — BH INPATIENT PSYCHIATRY PROGRESS NOTE - NSICDXBHPRIMARYDX_PSY_ALL_CORE
Bipolar 2 disorder, major depressive episode   F31.19  
Bipolar 2 disorder, major depressive episode   F31.09  
Bipolar 2 disorder, major depressive episode   F31.05  
Bipolar 2 disorder, major depressive episode   F31.50  
Bipolar 2 disorder, major depressive episode   F31.22  
Bipolar 2 disorder, major depressive episode   F31.31  
Bipolar 2 disorder, major depressive episode   F31.77  
Bipolar 2 disorder, major depressive episode   F31.05  
Bipolar 2 disorder, major depressive episode   F31.15  
Bipolar 2 disorder, major depressive episode   F31.93  
Bipolar 2 disorder, major depressive episode   F31.33  
Bipolar 2 disorder, major depressive episode   F31.49  
Bipolar 2 disorder, major depressive episode   F31.96  
Bipolar 2 disorder, major depressive episode   F31.13  
Bipolar 2 disorder, major depressive episode   F31.64  
Bipolar 2 disorder, major depressive episode   F31.89  
Bipolar 2 disorder, major depressive episode   F31.94  
Bipolar 2 disorder, major depressive episode   F31.98  
Bipolar 2 disorder, major depressive episode   F31.33  
Bipolar 2 disorder, major depressive episode   F31.31  
Bipolar 2 disorder, major depressive episode   F31.10  
Bipolar 2 disorder, major depressive episode   F31.15  
Bipolar 2 disorder, major depressive episode   F31.64  
Bipolar 2 disorder, major depressive episode   F31.12  
Bipolar 2 disorder, major depressive episode   F31.42  
Bipolar 2 disorder, major depressive episode   F31.12  
Bipolar 2 disorder, major depressive episode   F31.94  
Bipolar 2 disorder, major depressive episode   F31.19  
Bipolar 2 disorder, major depressive episode   F31.86  
Bipolar 2 disorder, major depressive episode   F31.64  
Bipolar 2 disorder, major depressive episode   F31.18  
Bipolar 2 disorder, major depressive episode   F31.38  
Bipolar 2 disorder, major depressive episode   F31.52  
Bipolar 2 disorder, major depressive episode   F31.79  
Bipolar 2 disorder, major depressive episode   F31.24  
Bipolar 2 disorder, major depressive episode   F31.26

## 2022-04-21 NOTE — BH INPATIENT PSYCHIATRY PROGRESS NOTE - NSBHATTESTSEENBY_PSY_A_CORE
attending Psychiatrist without NP/Trainee
Attending Psychiatrist supervising NP/Trainee, meeting pt...
attending Psychiatrist without NP/Trainee
Attending Psychiatrist supervising NP/Trainee, meeting pt...
attending Psychiatrist without NP/Trainee
Attending Psychiatrist supervising NP/Trainee, meeting pt...

## 2022-04-21 NOTE — BH INPATIENT PSYCHIATRY PROGRESS NOTE - NSBHCONSDANGERSELF_PSY_A_CORE
suicidal behavior/suicidal ideation with plan and means

## 2022-04-21 NOTE — BH INPATIENT PSYCHIATRY PROGRESS NOTE - NSTXSUICIDGOAL_PSY_ALL_CORE
Be able to state 3 reasons for living

## 2022-04-21 NOTE — BH INPATIENT PSYCHIATRY PROGRESS NOTE - NSBHFUPMEDSE_PSY_A_CORE
None known

## 2022-04-21 NOTE — BH INPATIENT PSYCHIATRY PROGRESS NOTE - MSE OPTIONS
Unstructured MSE
Structured MSE
Unstructured MSE
Structured MSE

## 2022-06-15 ENCOUNTER — TRANSCRIPTION ENCOUNTER (OUTPATIENT)
Age: 72
End: 2022-06-15

## 2022-06-15 ENCOUNTER — APPOINTMENT (OUTPATIENT)
Dept: ORTHOPEDIC SURGERY | Facility: CLINIC | Age: 72
End: 2022-06-15
Payer: MEDICARE

## 2022-06-15 DIAGNOSIS — S92.355A NONDISPLACED FRACTURE OF FIFTH METATARSAL BONE, LEFT FOOT, INITIAL ENCOUNTER FOR CLOSED FRACTURE: ICD-10-CM

## 2022-06-15 DIAGNOSIS — Z00.00 ENCOUNTER FOR GENERAL ADULT MEDICAL EXAMINATION W/OUT ABNORMAL FINDINGS: ICD-10-CM

## 2022-06-15 PROCEDURE — 99204 OFFICE O/P NEW MOD 45 MIN: CPT

## 2022-06-15 PROCEDURE — L4361: CPT

## 2022-06-15 PROCEDURE — 73630 X-RAY EXAM OF FOOT: CPT | Mod: LT

## 2022-06-15 NOTE — HISTORY OF PRESENT ILLNESS
[de-identified] : Pt is a 71 year old F who presents today for evaluation of their left foot. Pt states that she was crossing the street and twisted her ankle/foot on the curb 6/7/22. Pain localized along the lateral foot and plantar surface. Had XR taken at an assisted living facility which showed no evidence of fx. Went to GP who referred her here. Denies previous injury. No N/T. Ice to affected area. No formal treatment to date. WB in sneakers.  [] : Post Surgical Visit: no [FreeTextEntry1] : L Foot

## 2022-06-15 NOTE — PHYSICAL EXAM
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  [de-identified] : eversion 15 degrees [de-identified] : MTP joint DF 20 degrees [TWNoteComboBox6] : MTP joint PF 10 degrees

## 2022-06-29 ENCOUNTER — APPOINTMENT (OUTPATIENT)
Dept: ORTHOPEDIC SURGERY | Facility: CLINIC | Age: 72
End: 2022-06-29

## 2022-06-29 PROCEDURE — 99213 OFFICE O/P EST LOW 20 MIN: CPT

## 2022-06-29 NOTE — PHYSICAL EXAM
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 No change in alignment. [de-identified] : eversion 15 degrees [de-identified] : MTP joint DF 20 degrees [TWNoteComboBox6] : MTP joint PF 0 degrees

## 2022-06-29 NOTE — ASSESSMENT
[FreeTextEntry1] : Patient is doing well.  She will continue to be WBAT in CAM boot.\par Ice to affected area.\par Elevation encouraged.\par \par Patient was instructed that they can not operate an automatic vehicle while wearing a CAM boot or cast on the right lower extremity. If operating a vehicle that requires use of a clutch, patient may not drive while wearing a CAM boot or cast on the left lower extremity.\par \par Repeat x-ray will be performed at the next office visit.\par \par Patient wishes to follow up at the [place] office and therefore will see Dr. Lang.

## 2022-06-29 NOTE — HISTORY OF PRESENT ILLNESS
[Sudden] : sudden [4] : 4 [2] : 2 [Dull/Aching] : dull/aching [Localized] : localized [Intermittent] : intermittent [Leisure] : leisure [Social interactions] : social interactions [de-identified] : Patient returns for her left 5th metatarsal base fracture from  6/7/22. She has been wb in a cam walker and reports improvement, but still has some pain and swelling. [] : Post Surgical Visit: no [FreeTextEntry1] : L Foot

## 2022-07-01 ENCOUNTER — APPOINTMENT (OUTPATIENT)
Dept: ORTHOPEDIC SURGERY | Facility: CLINIC | Age: 72
End: 2022-07-01

## 2022-07-26 ENCOUNTER — APPOINTMENT (OUTPATIENT)
Dept: ORTHOPEDIC SURGERY | Facility: CLINIC | Age: 72
End: 2022-07-26

## 2022-07-26 DIAGNOSIS — S92.354D NONDISPLACED FRACTURE OF FIFTH METATARSAL BONE, RIGHT FOOT, SUBSEQUENT ENCOUNTER FOR FRACTURE WITH ROUTINE HEALING: ICD-10-CM

## 2022-07-26 PROCEDURE — 99213 OFFICE O/P EST LOW 20 MIN: CPT

## 2022-07-26 PROCEDURE — 73630 X-RAY EXAM OF FOOT: CPT | Mod: LT

## 2022-07-26 PROCEDURE — 73610 X-RAY EXAM OF ANKLE: CPT | Mod: LT

## 2022-07-26 NOTE — HISTORY OF PRESENT ILLNESS
[0] : 0 [de-identified] : Ms. MURRAY is a 72 year female who presents today for evaluation of their left foot injury and fifth metatarsal fracture.  She is now 7 weeks out from her injury and fracture after twisting her foot.  She has been treated with immobilization and protection in a cam walker boot by an outside orthopedist Dr. Tello.  She feels she is doing very well she does not notice any ankle or foot pain she denies any calf pain or any catching or locking of the ankle or foot she denies any history of recurrent ankle or foot sprains. [] : no [FreeTextEntry5] : Pau is here today for her LT Foot.\federico Saw Dr. Tello on June 15th and then again on the 29th of June.\federico Was walking about 6 or so weeks ago and her foot turned on the curb and she FX her foot.\federico Was placed in a small cam walker and has been wearing it.\federico Swelling and pain has gone down since being placed in the walker. [de-identified] : 6/29/22 [de-identified] : Dr. Tello  [de-identified] : x rays

## 2022-08-18 DIAGNOSIS — L02.612 CUTANEOUS ABSCESS OF LEFT FOOT: ICD-10-CM

## 2022-08-18 DIAGNOSIS — Z80.9 FAMILY HISTORY OF MALIGNANT NEOPLASM, UNSPECIFIED: ICD-10-CM

## 2022-08-18 DIAGNOSIS — L02.611 CUTANEOUS ABSCESS OF RIGHT FOOT: ICD-10-CM

## 2022-08-18 DIAGNOSIS — F99 MENTAL DISORDER, NOT OTHERWISE SPECIFIED: ICD-10-CM

## 2022-08-18 DIAGNOSIS — Z82.0 FAMILY HISTORY OF EPILEPSY AND OTHER DISEASES OF THE NERVOUS SYSTEM: ICD-10-CM

## 2022-08-18 DIAGNOSIS — F17.200 NICOTINE DEPENDENCE, UNSPECIFIED, UNCOMPLICATED: ICD-10-CM

## 2022-08-18 DIAGNOSIS — L60.0 INGROWING NAIL: ICD-10-CM

## 2022-08-18 DIAGNOSIS — Z86.59 PERSONAL HISTORY OF OTHER MENTAL AND BEHAVIORAL DISORDERS: ICD-10-CM

## 2022-08-18 DIAGNOSIS — Z82.5 FAMILY HISTORY OF ASTHMA AND OTHER CHRONIC LOWER RESPIRATORY DISEASES: ICD-10-CM

## 2022-08-18 RX ORDER — SIMVASTATIN 20 MG/1
20 TABLET, FILM COATED ORAL
Refills: 0 | Status: ACTIVE | COMMUNITY

## 2022-08-18 RX ORDER — LAMOTRIGINE 100 MG/1
100 TABLET ORAL
Refills: 0 | Status: ACTIVE | COMMUNITY

## 2022-08-18 RX ORDER — ESCITALOPRAM OXALATE 20 MG/1
20 TABLET ORAL
Refills: 0 | Status: ACTIVE | COMMUNITY

## 2022-08-18 RX ORDER — LORAZEPAM 0.5 MG/1
0.5 TABLET ORAL
Refills: 0 | Status: ACTIVE | COMMUNITY

## 2022-08-18 RX ORDER — CICLESONIDE 37 UG/1
37 AEROSOL, METERED NASAL
Refills: 0 | Status: ACTIVE | COMMUNITY

## 2022-08-18 RX ORDER — BREXPIPRAZOLE 2 MG/1
2 TABLET ORAL
Refills: 0 | Status: ACTIVE | COMMUNITY

## 2022-08-18 RX ORDER — QUETIAPINE FUMARATE 200 MG/1
200 TABLET ORAL
Refills: 0 | Status: ACTIVE | COMMUNITY

## 2022-08-18 RX ORDER — BETAMETHASONE DIPROPIONATE 0.5 MG/G
0.05 OINTMENT TOPICAL
Refills: 0 | Status: ACTIVE | COMMUNITY

## 2022-08-30 ENCOUNTER — APPOINTMENT (OUTPATIENT)
Dept: ORTHOPEDIC SURGERY | Facility: CLINIC | Age: 72
End: 2022-08-30

## 2022-08-31 ENCOUNTER — APPOINTMENT (OUTPATIENT)
Dept: PODIATRY | Facility: CLINIC | Age: 72
End: 2022-08-31

## 2022-08-31 VITALS — WEIGHT: 134 LBS | BODY MASS INDEX: 24.66 KG/M2 | HEIGHT: 62 IN

## 2022-08-31 DIAGNOSIS — L60.0 INGROWING NAIL: ICD-10-CM

## 2022-08-31 PROCEDURE — 99212 OFFICE O/P EST SF 10 MIN: CPT

## 2022-09-06 NOTE — PROCEDURE
[FreeTextEntry1] : Impression: Ingrown hallux nails, bilateral, both borders.\par \par Treatment: The offending portions of the nails were removed without anesthesia to relieve her pain.  Patient was given home care instructions.  Any questions or problems, patient is to contact the office.\par

## 2022-09-06 NOTE — HISTORY OF PRESENT ILLNESS
[Flip Flops] : flip flops [FreeTextEntry1] : Patient presents today with ingrown hallux nails, bilateral, both borders.  She has difficulty ambulating due to the pain and discomfort.  She has not been seen since January 2022.

## 2022-09-06 NOTE — PHYSICAL EXAM
[General Appearance - Alert] : alert [Ankle Swelling Bilaterally] : bilaterally  [2+] : left foot dorsalis pedis 2+ [Skin Color & Pigmentation] : normal skin color and pigmentation [Skin Turgor] : normal skin turgor [Skin Lesions] : no skin lesions [Sensation] : the sensory exam was normal to light touch and pinprick [No Focal Deficits] : no focal deficits [Deep Tendon Reflexes (DTR)] : deep tendon reflexes were 2+ and symmetric [Motor Exam] : the motor exam was normal [Ankle Swelling (On Exam)] : not present [FreeTextEntry3] : Within normal limits. [de-identified] : Forefoot varus, fully compensated.  HAV with bunion.   [FreeTextEntry1] : Bilateral sensation intact to the level of the toes.

## 2023-02-10 NOTE — BH INPATIENT PSYCHIATRY PROGRESS NOTE - CURRENT MEDICATION
Sharda is here for a six-week postpartum visit.  Her pregnancy was uncomplicated. She delivered vaginally with no perineal laceration. She is doing well with no complaints. Her partner is supportive.     Denies perineal pain.  Vaginal bleeding stopped. Denies constipation or other bowel concerns. Denies bladder concerns or leaking urine. Denies breast or nipple problems.     She has chosen condoms as a method of contraception.   She has resumed sexual activity.     Her baby is doing well.  She is breast and bottle feeding.      Postpartum depression screening score negative    PHYSICAL EXAM:  Visit Vitals  /82   Ht 5' 6\" (1.676 m)   Wt 81.5 kg (179 lb 10.8 oz)   LMP 2022   Breastfeeding Yes   BMI 29.00 kg/m²       HEART: RRR  LUNGS: CTA bilat. Normal respiratory effort.  NECK: Supple without thyromegaly or lymphadenopathy.  BREASTS: Deferred  ABDOMEN: Soft, nontender without masses or organomegaly  PELVIC EXAM:  Deferred    ASSESSMENT/PLAN:  37 year old    S/P     > Routine postpartum check-up. Patient may resume normal activities.  > Patient was instructed to continue on prenatal vitamins while nursing.  > Discussed contraception.  Patient has elected to use condoms. Counseled on return to fertility and need for consistent use.  > Pap smear not due  > Encouraged Kegel exercises. Reviewed instructions.  > Reviewed signs of postpartum depression, and how to get help.   > Return in clinic in 1 year for annual GYN exam or sooner as needed       MEDICATIONS  (STANDING):  aspirin enteric coated 81 milliGRAM(s) Oral daily  influenza  Vaccine (HIGH DOSE) 0.7 milliLiter(s) IntraMuscular once  lamoTRIgine 150 milliGRAM(s) Oral <User Schedule>  levothyroxine 25 MICROGram(s) Oral daily  LORazepam     Tablet 0.5 milliGRAM(s) Oral <User Schedule>  LORazepam     Tablet 0.25 milliGRAM(s) Oral at bedtime  multivitamin 1 Tablet(s) Oral daily  nicotine - 21 mG/24Hr(s) Patch 1 patch Transdermal daily  pantoprazole    Tablet 40 milliGRAM(s) Oral <User Schedule>  polyethylene glycol 3350 17 Gram(s) Oral <User Schedule>  QUEtiapine 200 milliGRAM(s) Oral at bedtime  senna 2 Tablet(s) Oral at bedtime  sertraline 150 milliGRAM(s) Oral daily    MEDICATIONS  (PRN):  artificial tears (preservative free) Ophthalmic Solution 1 Drop(s) Left EYE four times a day PRN dry eyes  bisacodyl Suppository 10 milliGRAM(s) Rectal daily PRN constipation  docosanol 10% Cream 1 Application(s) Topical every 6 hours PRN cold sore  magnesium hydroxide Suspension 30 milliLiter(s) Oral <User Schedule> PRN Constipation  nicotine  Polacrilex Gum 2 milliGRAM(s) Oral every 2 hours PRN nicotine craving   MEDICATIONS  (STANDING):  aspirin enteric coated 81 milliGRAM(s) Oral daily  influenza  Vaccine (HIGH DOSE) 0.7 milliLiter(s) IntraMuscular once  lamoTRIgine 150 milliGRAM(s) Oral <User Schedule>  levothyroxine 25 MICROGram(s) Oral daily  LORazepam     Tablet 0.25 milliGRAM(s) Oral at bedtime  LORazepam     Tablet 0.5 milliGRAM(s) Oral <User Schedule>  multivitamin 1 Tablet(s) Oral daily  nicotine - 21 mG/24Hr(s) Patch 1 patch Transdermal daily  pantoprazole    Tablet 40 milliGRAM(s) Oral <User Schedule>  polyethylene glycol 3350 17 Gram(s) Oral <User Schedule>  QUEtiapine 200 milliGRAM(s) Oral at bedtime  senna 2 Tablet(s) Oral at bedtime  sertraline 150 milliGRAM(s) Oral daily    MEDICATIONS  (PRN):  artificial tears (preservative free) Ophthalmic Solution 1 Drop(s) Left EYE four times a day PRN dry eyes  bisacodyl Suppository 10 milliGRAM(s) Rectal daily PRN constipation  docosanol 10% Cream 1 Application(s) Topical every 6 hours PRN cold sore  magnesium hydroxide Suspension 30 milliLiter(s) Oral <User Schedule> PRN Constipation  nicotine  Polacrilex Gum 2 milliGRAM(s) Oral every 2 hours PRN nicotine craving

## 2023-06-30 NOTE — BH INPATIENT PSYCHIATRY PROGRESS NOTE - NS ED BHA REVIEW OF ED CHART VITAL SIGNS REVIEWED
Yes
Warm/Dry
Yes

## 2024-11-04 NOTE — BH PATIENT CHARACTERISTICS SURVEY - NSPCSCHROMEDCON14_PSY_ALL_CORE
Marty Morris Dr 10-11-24  The above referenced H&P was reviewed by Kyree Flynn MD on 11/4/2024, the patient was examined and no significant changes have occurred in the patient's condition since the H&P was performed.  Risks and benefits were discussed, proceed with procedure as planned.     No